# Patient Record
Sex: MALE | Race: WHITE | NOT HISPANIC OR LATINO | Employment: OTHER | URBAN - METROPOLITAN AREA
[De-identification: names, ages, dates, MRNs, and addresses within clinical notes are randomized per-mention and may not be internally consistent; named-entity substitution may affect disease eponyms.]

---

## 2017-03-16 ENCOUNTER — HOSPITAL ENCOUNTER (OUTPATIENT)
Dept: CARDIOLOGY | Facility: MEDICAL CENTER | Age: 72
End: 2017-03-16
Attending: OPHTHALMOLOGY | Admitting: RADIOLOGY
Payer: MEDICARE

## 2017-03-16 ENCOUNTER — HOSPITAL ENCOUNTER (OUTPATIENT)
Dept: RADIOLOGY | Facility: MEDICAL CENTER | Age: 72
End: 2017-03-16
Attending: OPHTHALMOLOGY
Payer: MEDICARE

## 2017-03-16 DIAGNOSIS — H34.211 CHOLESTEROL RETINAL EMBOLUS OF RIGHT EYE: ICD-10-CM

## 2017-03-16 LAB
LV EJECT FRACT  99904: 65
LV EJECT FRACT MOD 2C 99903: 58.98
LV EJECT FRACT MOD 4C 99902: 70.6
LV EJECT FRACT MOD BP 99901: 65.99

## 2017-03-16 PROCEDURE — 93306 TTE W/DOPPLER COMPLETE: CPT

## 2017-03-16 PROCEDURE — 93306 TTE W/DOPPLER COMPLETE: CPT | Mod: 26 | Performed by: INTERNAL MEDICINE

## 2017-03-16 PROCEDURE — 93880 EXTRACRANIAL BILAT STUDY: CPT

## 2017-05-30 ENCOUNTER — HOSPITAL ENCOUNTER (OUTPATIENT)
Dept: RADIOLOGY | Facility: MEDICAL CENTER | Age: 72
End: 2017-05-30
Attending: OTOLARYNGOLOGY
Payer: MEDICARE

## 2017-05-30 DIAGNOSIS — J32.2 CHRONIC ETHMOIDAL SINUSITIS: ICD-10-CM

## 2017-05-30 PROCEDURE — 70486 CT MAXILLOFACIAL W/O DYE: CPT

## 2017-06-18 ENCOUNTER — HOSPITAL ENCOUNTER (EMERGENCY)
Facility: MEDICAL CENTER | Age: 72
End: 2017-06-18
Attending: EMERGENCY MEDICINE
Payer: MEDICARE

## 2017-06-18 ENCOUNTER — OFFICE VISIT (OUTPATIENT)
Dept: URGENT CARE | Facility: PHYSICIAN GROUP | Age: 72
End: 2017-06-18
Payer: MEDICARE

## 2017-06-18 ENCOUNTER — HOSPITAL ENCOUNTER (OUTPATIENT)
Dept: RADIOLOGY | Facility: MEDICAL CENTER | Age: 72
End: 2017-06-18
Attending: PHYSICIAN ASSISTANT
Payer: MEDICARE

## 2017-06-18 ENCOUNTER — HOSPITAL ENCOUNTER (OUTPATIENT)
Facility: MEDICAL CENTER | Age: 72
End: 2017-06-18
Attending: PHYSICIAN ASSISTANT
Payer: MEDICARE

## 2017-06-18 VITALS
BODY MASS INDEX: 23.05 KG/M2 | DIASTOLIC BLOOD PRESSURE: 84 MMHG | TEMPERATURE: 97.6 F | HEART RATE: 86 BPM | HEIGHT: 69 IN | RESPIRATION RATE: 165 BRPM | WEIGHT: 155.65 LBS | SYSTOLIC BLOOD PRESSURE: 134 MMHG

## 2017-06-18 VITALS
RESPIRATION RATE: 14 BRPM | HEART RATE: 70 BPM | DIASTOLIC BLOOD PRESSURE: 70 MMHG | TEMPERATURE: 98.2 F | SYSTOLIC BLOOD PRESSURE: 110 MMHG | HEIGHT: 69 IN | BODY MASS INDEX: 23.11 KG/M2 | OXYGEN SATURATION: 96 % | WEIGHT: 156 LBS

## 2017-06-18 DIAGNOSIS — C61 PROSTATE CANCER (HCC): ICD-10-CM

## 2017-06-18 DIAGNOSIS — R31.9 HEMATURIA: ICD-10-CM

## 2017-06-18 DIAGNOSIS — R30.0 DYSURIA: ICD-10-CM

## 2017-06-18 LAB
APPEARANCE UR: ABNORMAL
APPEARANCE UR: NORMAL
BILIRUB UR QL STRIP.AUTO: NEGATIVE
BILIRUB UR STRIP-MCNC: NORMAL MG/DL
COLOR UR AUTO: YELLOW
COLOR UR: YELLOW
EPI CELLS #/AREA URNS HPF: ABNORMAL /HPF
GLUCOSE UR STRIP.AUTO-MCNC: NEGATIVE MG/DL
GLUCOSE UR STRIP.AUTO-MCNC: NORMAL MG/DL
KETONES UR STRIP.AUTO-MCNC: ABNORMAL MG/DL
KETONES UR STRIP.AUTO-MCNC: NORMAL MG/DL
LEUKOCYTE ESTERASE UR QL STRIP.AUTO: NEGATIVE
LEUKOCYTE ESTERASE UR QL STRIP.AUTO: NORMAL
MICRO URNS: ABNORMAL
NITRITE UR QL STRIP.AUTO: NEGATIVE
NITRITE UR QL STRIP.AUTO: NORMAL
PH UR STRIP.AUTO: 6 [PH]
PH UR STRIP.AUTO: 7.5 [PH] (ref 5–8)
PROT UR QL STRIP: NEGATIVE MG/DL
PROT UR QL STRIP: NORMAL MG/DL
RBC # URNS HPF: >150 /HPF
RBC UR QL AUTO: ABNORMAL
RBC UR QL AUTO: NORMAL
SP GR UR STRIP.AUTO: 1
SP GR UR STRIP.AUTO: 1.02
UROBILINOGEN UR STRIP-MCNC: NORMAL MG/DL
WBC #/AREA URNS HPF: ABNORMAL /HPF

## 2017-06-18 PROCEDURE — 81002 URINALYSIS NONAUTO W/O SCOPE: CPT | Performed by: PHYSICIAN ASSISTANT

## 2017-06-18 PROCEDURE — 74176 CT ABD & PELVIS W/O CONTRAST: CPT

## 2017-06-18 PROCEDURE — 81001 URINALYSIS AUTO W/SCOPE: CPT

## 2017-06-18 PROCEDURE — 99203 OFFICE O/P NEW LOW 30 MIN: CPT | Performed by: PHYSICIAN ASSISTANT

## 2017-06-18 PROCEDURE — 99283 EMERGENCY DEPT VISIT LOW MDM: CPT

## 2017-06-18 RX ORDER — CHOLECALCIFEROL (VITAMIN D3) 50 MCG
2000 TABLET ORAL DAILY
Status: SHIPPED | COMMUNITY
End: 2018-12-27

## 2017-06-18 RX ORDER — NIACIN 500 MG
500 TABLET ORAL DAILY
Status: SHIPPED | COMMUNITY
End: 2017-08-21

## 2017-06-18 RX ORDER — ATORVASTATIN CALCIUM 40 MG/1
40 TABLET, FILM COATED ORAL EVERY EVENING
Status: SHIPPED | COMMUNITY
End: 2021-04-24

## 2017-06-18 RX ORDER — DUTASTERIDE 0.5 MG/1
0.5 CAPSULE, LIQUID FILLED ORAL EVERY MORNING
Status: SHIPPED | COMMUNITY
End: 2021-04-24

## 2017-06-18 RX ORDER — POLYETHYLENE GLYCOL 3350 17 G/17G
17 POWDER, FOR SOLUTION ORAL DAILY
COMMUNITY

## 2017-06-18 RX ORDER — CIPROFLOXACIN 500 MG/1
500 TABLET, FILM COATED ORAL 2 TIMES DAILY
Qty: 10 TAB | Refills: 0 | Status: SHIPPED | OUTPATIENT
Start: 2017-06-18 | End: 2017-06-23

## 2017-06-18 ASSESSMENT — ENCOUNTER SYMPTOMS
EYES NEGATIVE: 1
RESPIRATORY NEGATIVE: 1
GASTROINTESTINAL NEGATIVE: 1
CONSTITUTIONAL NEGATIVE: 1
MUSCULOSKELETAL NEGATIVE: 1
NEUROLOGICAL NEGATIVE: 1
PSYCHIATRIC NEGATIVE: 1
CARDIOVASCULAR NEGATIVE: 1
FLANK PAIN: 0

## 2017-06-18 NOTE — PATIENT INSTRUCTIONS
Dean Parker M.D.  [address]  [address]  [place] [address]  [phone]    Diverticulitis  Diverticulitis is inflammation or infection of small pouches in your colon that form when you have a condition called diverticulosis. The pouches in your colon are called diverticula. Your colon, or large intestine, is where water is absorbed and stool is formed.  Complications of diverticulitis can include:  · Bleeding.  · Severe infection.  · Severe pain.  · Perforation of your colon.  · Obstruction of your colon.  CAUSES   Diverticulitis is caused by bacteria.  Diverticulitis happens when stool becomes trapped in diverticula. This allows bacteria to grow in the diverticula, which can lead to inflammation and infection.  RISK FACTORS  People with diverticulosis are at risk for diverticulitis. Eating a diet that does not include enough fiber from fruits and vegetables may make diverticulitis more likely to develop.  SYMPTOMS   Symptoms of diverticulitis may include:  · Abdominal pain and tenderness. The pain is normally located on the left side of the abdomen, but may occur in other areas.  · Fever and chills.  · Bloating.  · Cramping.  · Nausea.  · Vomiting.  · Constipation.  · Diarrhea.  · Blood in your stool.  DIAGNOSIS   Your health care provider will ask you about your medical history and do a physical exam. You may need to have tests done because many medical conditions can cause the same symptoms as diverticulitis. Tests may include:  · Blood tests.  · Urine tests.  · Imaging tests of the abdomen, including X-rays and CT scans.  When your condition is under control, your health care provider may recommend that you have a colonoscopy. A colonoscopy can show how severe your diverticula are and whether something else is causing your symptoms.  TREATMENT   Most cases of diverticulitis are mild and can be treated at home. Treatment may include:  · Taking over-the-counter pain medicines.  · Following a clear liquid  diet.  · Taking antibiotic medicines by mouth for 7-10 days.  More severe cases may be treated at a hospital. Treatment may include:  · Not eating or drinking.  · Taking prescription pain medicine.  · Receiving antibiotic medicines through an IV tube.  · Receiving fluids and nutrition through an IV tube.  · Surgery.  HOME CARE INSTRUCTIONS   · Follow your health care provider's instructions carefully.  · Follow a full liquid diet or other diet as directed by your health care provider. After your symptoms improve, your health care provider may tell you to change your diet. He or she may recommend you eat a high-fiber diet. Fruits and vegetables are good sources of fiber. Fiber makes it easier to pass stool.  · Take fiber supplements or probiotics as directed by your health care provider.  · Only take medicines as directed by your health care provider.  · Keep all your follow-up appointments.  SEEK MEDICAL CARE IF:   · Your pain does not improve.  · You have a hard time eating food.  · Your bowel movements do not return to normal.  SEEK IMMEDIATE MEDICAL CARE IF:   · Your pain becomes worse.  · Your symptoms do not get better.  · Your symptoms suddenly get worse.  · You have a fever.  · You have repeated vomiting.  · You have bloody or black, tarry stools.  MAKE SURE YOU:   · Understand these instructions.  · Will watch your condition.  · Will get help right away if you are not doing well or get worse.     This information is not intended to replace advice given to you by your health care provider. Make sure you discuss any questions you have with your health care provider.     Document Released: 09/27/2006 Document Revised: 12/23/2014 Document Reviewed: 11/12/2014  [employer] Patient Education ©2016 [employer]

## 2017-06-18 NOTE — ED AVS SNAPSHOT
Home Care Instructions                                                                                                                Laci Baugh   MRN: [id number]    Department:  [facility], Emergency Dept   Date of Visit:  6/18/2017            [facility], Emergency Dept    [address]    [place] [address]    Phone:  [phone]      You were seen by     Elieser Florez D.O.      Your Diagnosis Was     Hematuria     R31.9       Follow-up Information     1. Schedule an appointment as soon as possible for a visit with Dean Parker M.D..    Specialty:  Urology    Contact information    [address]  [address]  [place] [address]  [phone]          2. Follow up with [facility], Emergency Dept.    Specialty:  Emergency Medicine    Why:  If symptoms worsen    Contact information    [address] [place] [address] [phone]        3. Schedule an appointment as soon as possible for a visit with Douglas Weiss M.D..    Specialty:  Internal Medicine    Why:  As needed    Contact information    [address]  [facility] [id number] [phone]        Medication Information     Review all of your home medications and newly ordered medications with your primary doctor and/or pharmacist as soon as possible. Follow medication instructions as directed by your doctor and/or pharmacist.     Please keep your complete medication list with you and share with your physician. Update the information when medications are discontinued, doses are changed, or new medications (including over-the-counter products) are added; and carry medication information at all times in the event of emergency situations.               Medication List      ASK your doctor about these medications        Instructions    Morning Afternoon Evening Bedtime    AVODART 0.5 MG capsule   Generic drug:  dutasteride        Take 0.5 mg by mouth every day.    Dose:  0.5 mg                        coenzyme Q-10 30 MG capsule        Take 60 mg by mouth every day.   Dose:  60 mg                        metformin  MG Tb24   Commonly known as:  GLUCOPHAGE XR        Take 750 mg by mouth every day.   Dose:  750 mg                        polyethylene glycol/lytes Pack   Commonly known as:  MIRALAX        Take 17 g by mouth every day. Indications: Constipation   Dose:  17 g                                Procedures and tests performed during your visit     URINALYSIS    URINE MICROSCOPIC (W/UA)        Discharge Instructions       Hematuria, Adult  Hematuria is blood in your urine. It can be caused by a bladder infection, kidney infection, prostate infection, kidney stone, or cancer of your urinary tract. Infections can usually be treated with medicine, and a kidney stone usually will pass through your urine. If neither of these is the cause of your hematuria, further workup to find out the reason may be needed.  It is very important that you tell your health care provider about any blood you see in your urine, even if the blood stops without treatment or happens without causing pain. Blood in your urine that happens and then stops and then happens again can be a symptom of a very serious condition. Also, pain is not a symptom in the initial stages of many urinary cancers.  HOME CARE INSTRUCTIONS   · Drink lots of fluid, 3-4 quarts a day. If you have been diagnosed with an infection, cranberry juice is especially recommended, in addition to large amounts of water.  · Avoid caffeine, tea, and carbonated beverages because they tend to irritate the bladder.  · Avoid alcohol because it may irritate the prostate.  · Take all medicines as directed by your health care provider.  · If you were prescribed an antibiotic medicine, finish it all even if you start to feel better.  · If you have been diagnosed with a kidney stone, follow your health care provider's instructions regarding  straining your urine to catch the stone.  · Empty your bladder often. Avoid holding urine for long periods of time.  · After a bowel movement, women should cleanse front to back. Use each tissue only once.  · Empty your bladder before and after sexual intercourse if you are a female.  SEEK MEDICAL CARE IF:  · You develop back pain.  · You have a fever.  · You have a feeling of sickness in your stomach (nausea) or vomiting.  · Your symptoms are not better in 3 days. Return sooner if you are getting worse.  SEEK IMMEDIATE MEDICAL CARE IF:   · You develop severe vomiting and are unable to keep the medicine down.  · You develop severe back or abdominal pain despite taking your medicines.  · You begin passing a large amount of blood or clots in your urine.  · You feel extremely weak or faint, or you pass out.  MAKE SURE YOU:   · Understand these instructions.  · Will watch your condition.  · Will get help right away if you are not doing well or get worse.     This information is not intended to replace advice given to you by your health care provider. Make sure you discuss any questions you have with your health care provider.     Document Released: 12/18/2006 Document Revised: 01/08/2016 Document Reviewed: 08/18/2014  [employer] Interactive Patient Education ©2016 [employer]            Patient Information     Patient Information    Following emergency treatment: all patient requiring follow-up care must return either to a private physician or a clinic if your condition worsens before you are able to obtain further medical attention, please return to the emergency room.     Billing Information    At [facility], we work to make the billing process streamlined for our patients.  Our Representatives are here to answer any questions you may have regarding your hospital bill.  If you have insurance coverage and have supplied your insurance information to us, we will submit a claim to your insurer on your behalf.  Should  you have any questions regarding your bill, we can be reached online or by phone as follows:  Online: You are able pay your bills online or live chat with our representatives about any billing questions you may have. We are here to help Monday - Friday from 8:00am to 7:30pm and 9:00am - 12:00pm on Saturdays.  Please visit [email or web address]  for more information.   Phone:  [phone] or [phone]    Please note that your emergency physician, surgeon, pathologist, radiologist, anesthesiologist, and other specialists are not employed by [facility] and will therefore bill separately for their services.  Please contact them directly for any questions concerning their bills at the numbers below:     Emergency Physician Services:  [phone]  [facility]:  [phone]  [facility]:  [phone]  [facility]:  [phone]    1. Your final bill may vary from the amount quoted upon discharge if all procedures are not complete at that time, or if your doctor has additional procedures of which we are not aware. You will receive an additional bill if you return to the Emergency Department at [facility] for suture removal regardless of the facility of which the sutures were placed.     2. Please arrange for settlement of this account at the emergency registration.    3. All self-pay accounts are due in full at the time of treatment.  If you are unable to meet this obligation then payment is expected within 4-5 days.     4. If you have had radiology studies (CT, X-ray, Ultrasound, MRI), you have received a preliminary result during your emergency department visit. Please contact the radiology department [phone] to receive a copy of your final result. Please discuss the Final result with your primary physician or with the follow up physician provided.     Crisis Hotline:  National Crisis Hotline:  7-479-WFZYWAH or  1-924.919.7831  [place] Crisis Hotline:    1-652.159.4437 or 948-516-2490         ED Discharge Follow Up Questions    1. In order to provide you with very good care, we would like to follow up with a phone call in the next few days.  May we have your permission to contact you?     YES /  NO    2. What is the best phone number to call you? (       )_____-__________    3. What is the best time to call you?      Morning  /  Afternoon  /  Evening                   Patient Signature:  ____________________________________________________________    Date:  ____________________________________________________________

## 2017-06-18 NOTE — ED NOTES
Pt D/C to home. D/C instructions given to patient. Pt to call Monday morning and make an apt with urology for follow up; phone number highlighted. Pt verbalizes understanding. Pt leaves ED with no acute changes, complaints or concerns.

## 2017-06-18 NOTE — ED AVS SNAPSHOT
6/18/2017    Laci Schwarz Sheets  [address] [place] [address]    Dear Laci:    [facility] wants to ensure your discharge home is safe and you or your loved ones have had all of your questions answered regarding your care after you leave the hospital.    Below is a list of resources and contact information should you have any questions regarding your hospital stay, follow-up instructions, or active medical symptoms.    Questions or Concerns Regarding… Contact   Medical Questions Related to Your Discharge  (7 days a week, 8am-5pm) Contact a Nurse Care Coordinator   [phone]   Medical Questions Not Related to Your Discharge  (24 hours a day / 7 days a week)  Contact the Nurse Health Line   [phone]    Medications or Discharge Instructions Refer to your discharge packet   or contact your [facility] Primary Care Provider   [phone]   Follow-up Appointment(s) Schedule your appointment via [employer]   or contact Scheduling [phone]   Billing Review your statement via [employer]  or contact Billing [phone]   Medical Records Review your records via [employer]   or contact Medical Records [phone]     You may receive a telephone call within two days of discharge. This call is to make certain you understand your discharge instructions and have the opportunity to have any questions answered. You can also easily access your medical information, test results and upcoming appointments via the [employer] free online health management tool. You can learn more and sign up at [email or web address]. For assistance setting up your [employer] account, please call [phone].    Once again, we want to ensure your discharge home is safe and that you have a clear understanding of any next steps in your care. If you have any questions or concerns, please do not hesitate to contact us, we are here for you. Thank you for choosing [facility] for your healthcare needs.    Sincerely,    Your [facility] Healthcare Team

## 2017-06-18 NOTE — ED NOTES
Pt presents to the ER w/c/o blood in urine. Pt states that yesterday his urine was pink, but he worked out this morning and then afterwards he noted that he was peeing blood with clots in them. Pt denies pain/fevers.

## 2017-06-18 NOTE — ED PROVIDER NOTES
ED Provider Note    CHIEF COMPLAINT  Chief Complaint   Patient presents with   • Blood in Urine       HPI  Laci Baugh is a 72 y.o. male who presents complaining of blood in his urine ×24 hours. He stated that he yesterday had stopped light pink tinged urine and today has severe clots in his urine. He will urinate without difficulty. Denies fever, shakes, chills, sweats, abdominal pain. He has had a polyp on his prostate diagnosed before without problems. He states occasionally he has pink urine but never to red clots. His urine is clearing currently.    REVIEW OF SYSTEMS  Pertinent positives include bloody urine  Pertinent negatives include fever, abdominal pain    PAST MEDICAL HISTORY  Past Medical History   Diagnosis Date   • Hypertension    • Stroke        FAMILY HISTORY  No family history on file.    SOCIAL HISTORY  Social History     Social History   • Marital Status:      Spouse Name: N/A   • Number of Children: N/A   • Years of Education: N/A     Social History Main Topics   • Smoking status: Former Smoker   • Smokeless tobacco: Not on file   • Alcohol Use: Not on file   • Drug Use: Not on file   • Sexual Activity: Not on file     Other Topics Concern   • Not on file     Social History Narrative   • No narrative on file       SURGICAL HISTORY  No past surgical history on file.    CURRENT MEDICATIONS  Home Medications     Reviewed by Sayra Sanford R.N. (Registered Nurse) on 06/18/17 at 0615  Med List Status: Complete    Medication Last Dose Status    aspirin (ASA) 81 MG Chew Tab chewable tablet  Active    atorvastatin (LIPITOR) 20 MG Tab 6/23/2016 Active    coenzyme Q-10 30 MG capsule 6/18/2017 Active    dutasteride (AVODART) 0.5 MG capsule 6/18/2017 Active    metformin ER (GLUCOPHAGE XR) 750 MG TABLET SR 24 HR 6/23/2016 Active    polyethylene glycol/lytes (MIRALAX) Pack 6/18/2017 Active    vitamin D (CHOLECALCIFEROL) 1000 UNIT Tab 6/18/2017 Active                ALLERGIES  Allergies    Allergen Reactions   • Bloodless    • Contrast Media With Iodine [Iodine]        PHYSICAL EXAM  VITAL SIGNS: There were no vitals taken for this visit.     Constitutional: Well developed, Well nourished, No acute distress, Non-toxic appearance.   Eyes: PERRLA, EOMI, Conjunctiva normal, No discharge.   Abdomen: Bowel sounds normal, Soft, No suprapubic tenderness, No guarding, No rebound, No masses, No pulsatile masses.   Skin: Warm, Dry, No erythema, No rash.   Back: No midline thoracic and lumbar spine tenderness, No CVA tenderness.   Extremities:  No edema,no obvious deformity, no evidence of trauma  Neurologic: Alert & oriented x 3, No focal deficits noted, normal gait.    Results for orders placed or performed during the hospital encounter of 06/18/17   URINALYSIS   Result Value Ref Range    Micro Urine Req Microscopic     Color Yellow     Character Hazy (A)     Specific Gravity 1.020 <1.035    Ph 6.0 5.0-8.0    Glucose Negative Negative mg/dL    Ketones Trace (A) Negative mg/dL    Protein Negative Negative mg/dL    Bilirubin Negative Negative    Nitrite Negative Negative    Leukocyte Esterase Negative Negative    Occult Blood Large (A) Negative   URINE MICROSCOPIC (W/UA)   Result Value Ref Range    WBC 0-2 (A) /hpf    RBC >150 (A) /hpf    Epithelial Cells Rare Few /hpf       COURSE & MEDICAL DECISION MAKING  Pertinent Labs & Imaging studies reviewed. (See chart for details)  This is a charming 72-year-old male with hematuria. The patient does not have evidence of urinary tract infection and he was able urinate here in the emergency department and is urine has cleared significantly. The differential includes polyp, cancer, infection. The patient will be following up with Dr. Sánchez of urology for further evaluation of possible cystoscopy and biopsy. The patient understands the need to return to the emergency department if he is unable to urinate, has high fevers or severe pain.    New Prescriptions    No  medications on file         FINAL IMPRESSION  Hematuria     The patient will return for new or worsening symptoms and is stable at the time of discharge.    The patient is referred to a primary physician for blood pressure management, diabetic screening, and for all other preventative health concerns.    DISPOSITION:  Patient will be discharged home in stable condition.    FOLLOW UP:  Dean Parker M.D.  [address]  [address]  [place] [address]  [phone]    Schedule an appointment as soon as possible for a visit      [facility], Emergency Dept  [address] [facility]  [place] [id number] [phone]    If symptoms worsen    Douglas Weiss M.D.  [address] [facility] [address] [phone]    Schedule an appointment as soon as possible for a visit  As needed      OUTPATIENT MEDICATIONS:  New Prescriptions    No medications on file

## 2017-06-18 NOTE — MR AVS SNAPSHOT
"        Laci Schwarz Sheets   2017 4:15 PM   Office Visit   MRN: [id number]    Department:  [facility]   Dept Phone:  [phone]    Description:  Male : 1945   Provider:  Rubens Yang PA-C           Reason for Visit     Blood in Urine blood in urine x1 day      Allergies as of 2017     Allergen Noted Reactions    Bloodless 2016       Contrast Media With Iodine [Iodine] 2016   Rash    \"Rash all over body\".        You were diagnosed with     Hematuria   [3179879]       Prostate cancer (CMS-HCC)   [597783]       Dysuria   [788.1.ICD-9-CM]         Vital Signs     Blood Pressure Pulse Temperature Respirations Height Weight    110/70 mmHg 70 36.8 °C (98.2 °F) 14 1.753 m (5' 9.02\") 70.761 kg (156 lb)    Body Mass Index Oxygen Saturation Smoking Status             23.03 kg/m2 96% Former Smoker         Basic Information     Date Of Birth Sex Race Ethnicity Preferred Language    1945 Male White Non- English      Problem List              ICD-10-CM Priority Class Noted - Resolved    Hyponatremia E87.1   2016 - Present    Confusion R41.0   2016 - Present    HTN (hypertension) I10   2016 - Present    HLD (hyperlipidemia) E78.5   2016 - Present    Chest pressure R07.89   2016 - Present    BPH (benign prostatic hyperplasia) N40.0   2016 - Present    DM2 (diabetes mellitus, type 2) (CMS-HCC) E11.9   2016 - Present      Health Maintenance     Patient has no pending health maintenance at this time      Results     POCT Urinalysis      Component Value Standard Range & Units    POC Color yellow Negative    POC Appearance cloudy Negative    POC Leukocyte Esterase mod Negative    POC Nitrites neg Negative    POC Urobiligen neg Negative (0.2) mg/dL    POC Protein neg Negative mg/dL    POC Urine PH 7.5 5.0 - 8.0    POC Blood hemolyzed Negative    POC Specific Gravity 1.005 <1.005 - >1.030    POC Ketones neg Negative mg/dL    POC Biliruben neg Negative " mg/dL    POC Glucose neg Negative mg/dL                        Current Immunizations     No immunizations on file.      Below and/or attached are the medications your provider expects you to take. Review all of your home medications and newly ordered medications with your provider and/or pharmacist. Follow medication instructions as directed by your provider and/or pharmacist. Please keep your medication list with you and share with your provider. Update the information when medications are discontinued, doses are changed, or new medications (including over-the-counter products) are added; and carry medication information at all times in the event of emergency situations     Allergies:  BLOODLESS - (reactions not documented)     CONTRAST MEDIA WITH IODINE - Rash               Medications  Valid as of: June 18, 2017 -  5:58 PM    Generic Name Brand Name Tablet Size Instructions for use    Aspirin (Tablet Delayed Response) ECOTRIN 81 MG Take  mg by mouth every day. Pt takes:  243Mg in AM  81MG at 1200        Atorvastatin Calcium (Tab) LIPITOR 40 MG Take 60 mg by mouth every day.        B Complex Vitamins   Take 1 Tab by mouth every day.        Cholecalciferol (Tab) vitamin D 2000 UNIT Take 2,000 Units by mouth every day.        Ciprofloxacin HCl (Tab) CIPRO 500 MG Take 1 Tab by mouth 2 times a day for 5 days.        Coenzyme Q10 (Cap) coenzyme Q-10 30 MG Take 60 mg by mouth every day.        Dutasteride (Cap) AVODART 0.5 MG Take 0.5 mg by mouth every day.        MetFORMIN HCl (TABLET SR 24 HR) GLUCOPHAGE  MG Take 750 mg by mouth 2 times a day.        Niacin (Tab) niacin 500 MG Take 500 mg by mouth every day.        Polyethylene Glycol 3350 (Pack) MIRALAX  Take 17 g by mouth every day. Indications: Constipation        .                 Medicines prescribed today were sent to:     [id number] - [place] - [address] AT [address] & [facility]    [address]  [address]    Phone: [phone] Fax: [phone]    Open 24 Hours?: No      Medication refill instructions:       If your prescription bottle indicates you have medication refills left, it is not necessary to call your provider’s office. Please contact your pharmacy and they will refill your medication.    If your prescription bottle indicates you do not have any refills left, you may request refills at any time through one of the following ways: The online [employer] system (except Urgent Care), by calling your provider’s office, or by asking your pharmacy to contact your provider’s office with a refill request. Medication refills are processed only during regular business hours and may not be available until the next business day. Your provider may request additional information or to have a follow-up visit with you prior to refilling your medication.   *Please Note: Medication refills are assigned a new Rx number when refilled electronically. Your pharmacy may indicate that no refills were authorized even though a new prescription for the same medication is available at the pharmacy. Please request the medicine by name with the pharmacy before contacting your provider for a refill.        Your To Do List     Future Labs/Procedures Complete By Expires    URINE CULTURE(NEW)  As directed 6/18/2018      Referral     A referral request has been sent to our patient care coordination department. Please allow 3-5 business days for us to process this request and contact you either by phone or mail. If you do not hear from us by the 5th business day, please call us at [phone].        Instructions    Dean Parker M.D.  [address]  [address]  [facility] [address]  [phone]    Diverticulitis  Diverticulitis is inflammation or infection of small pouches in your colon that form when you have a condition called diverticulosis. The pouches in your colon are called diverticula. Your colon, or large intestine, is where water  is absorbed and stool is formed.  Complications of diverticulitis can include:  · Bleeding.  · Severe infection.  · Severe pain.  · Perforation of your colon.  · Obstruction of your colon.  CAUSES   Diverticulitis is caused by bacteria.  Diverticulitis happens when stool becomes trapped in diverticula. This allows bacteria to grow in the diverticula, which can lead to inflammation and infection.  RISK FACTORS  People with diverticulosis are at risk for diverticulitis. Eating a diet that does not include enough fiber from fruits and vegetables may make diverticulitis more likely to develop.  SYMPTOMS   Symptoms of diverticulitis may include:  · Abdominal pain and tenderness. The pain is normally located on the left side of the abdomen, but may occur in other areas.  · Fever and chills.  · Bloating.  · Cramping.  · Nausea.  · Vomiting.  · Constipation.  · Diarrhea.  · Blood in your stool.  DIAGNOSIS   Your health care provider will ask you about your medical history and do a physical exam. You may need to have tests done because many medical conditions can cause the same symptoms as diverticulitis. Tests may include:  · Blood tests.  · Urine tests.  · Imaging tests of the abdomen, including X-rays and CT scans.  When your condition is under control, your health care provider may recommend that you have a colonoscopy. A colonoscopy can show how severe your diverticula are and whether something else is causing your symptoms.  TREATMENT   Most cases of diverticulitis are mild and can be treated at home. Treatment may include:  · Taking over-the-counter pain medicines.  · Following a clear liquid diet.  · Taking antibiotic medicines by mouth for 7-10 days.  More severe cases may be treated at a hospital. Treatment may include:  · Not eating or drinking.  · Taking prescription pain medicine.  · Receiving antibiotic medicines through an IV tube.  · Receiving fluids and nutrition through an IV tube.  · Surgery.  HOME CARE  INSTRUCTIONS   · Follow your health care provider's instructions carefully.  · Follow a full liquid diet or other diet as directed by your health care provider. After your symptoms improve, your health care provider may tell you to change your diet. He or she may recommend you eat a high-fiber diet. Fruits and vegetables are good sources of fiber. Fiber makes it easier to pass stool.  · Take fiber supplements or probiotics as directed by your health care provider.  · Only take medicines as directed by your health care provider.  · Keep all your follow-up appointments.  SEEK MEDICAL CARE IF:   · Your pain does not improve.  · You have a hard time eating food.  · Your bowel movements do not return to normal.  SEEK IMMEDIATE MEDICAL CARE IF:   · Your pain becomes worse.  · Your symptoms do not get better.  · Your symptoms suddenly get worse.  · You have a fever.  · You have repeated vomiting.  · You have bloody or black, tarry stools.  MAKE SURE YOU:   · Understand these instructions.  · Will watch your condition.  · Will get help right away if you are not doing well or get worse.     This information is not intended to replace advice given to you by your health care provider. Make sure you discuss any questions you have with your health care provider.     Document Released: 09/27/2006 Document Revised: 12/23/2014 Document Reviewed: 11/12/2014  [employer] Interactive Patient Education ©2016 Elsevier Inc.            [employer] Access Code: Activation code not generated  Current [employer] Status: Active

## 2017-06-18 NOTE — ED NOTES
Pt went to bathroom and states that urine is starting to get lighter and it looks like there is less blood.

## 2017-06-18 NOTE — DISCHARGE INSTRUCTIONS
Hematuria, Adult  Hematuria is blood in your urine. It can be caused by a bladder infection, kidney infection, prostate infection, kidney stone, or cancer of your urinary tract. Infections can usually be treated with medicine, and a kidney stone usually will pass through your urine. If neither of these is the cause of your hematuria, further workup to find out the reason may be needed.  It is very important that you tell your health care provider about any blood you see in your urine, even if the blood stops without treatment or happens without causing pain. Blood in your urine that happens and then stops and then happens again can be a symptom of a very serious condition. Also, pain is not a symptom in the initial stages of many urinary cancers.  HOME CARE INSTRUCTIONS   · Drink lots of fluid, 3-4 quarts a day. If you have been diagnosed with an infection, cranberry juice is especially recommended, in addition to large amounts of water.  · Avoid caffeine, tea, and carbonated beverages because they tend to irritate the bladder.  · Avoid alcohol because it may irritate the prostate.  · Take all medicines as directed by your health care provider.  · If you were prescribed an antibiotic medicine, finish it all even if you start to feel better.  · If you have been diagnosed with a kidney stone, follow your health care provider's instructions regarding straining your urine to catch the stone.  · Empty your bladder often. Avoid holding urine for long periods of time.  · After a bowel movement, women should cleanse front to back. Use each tissue only once.  · Empty your bladder before and after sexual intercourse if you are a female.  SEEK MEDICAL CARE IF:  · You develop back pain.  · You have a fever.  · You have a feeling of sickness in your stomach (nausea) or vomiting.  · Your symptoms are not better in 3 days. Return sooner if you are getting worse.  SEEK IMMEDIATE MEDICAL CARE IF:   · You develop severe vomiting and  are unable to keep the medicine down.  · You develop severe back or abdominal pain despite taking your medicines.  · You begin passing a large amount of blood or clots in your urine.  · You feel extremely weak or faint, or you pass out.  MAKE SURE YOU:   · Understand these instructions.  · Will watch your condition.  · Will get help right away if you are not doing well or get worse.     This information is not intended to replace advice given to you by your health care provider. Make sure you discuss any questions you have with your health care provider.     Document Released: 12/18/2006 Document Revised: 01/08/2016 Document Reviewed: 08/18/2014  [employer] Interactive Patient Education ©2016 Elsevier Inc.

## 2017-06-18 NOTE — ED AVS SNAPSHOT
riskmethods Access Code: Activation code not generated  Current riskmethods Status: Active    [employer]  A secure, online tool to manage your health information     [employer] riskmethods® is a secure, online tool that connects you to your personalized health information from the privacy of your home -- day or night - making it very easy for you to manage your healthcare. Once the activation process is completed, you can even access your medical information using the riskmethods roseann, which is available for free in the Apple Roseann store or Google Play store.     riskmethods provides the following levels of access (as shown below):   My Chart Features   [facility] Primary Care Doctor [facility]  Specialists [facility]  Urgent  Care Non-[facility]  Primary Care  Doctor   Email your healthcare team securely and privately 24/7 X X X X   Manage appointments: schedule your next appointment; view details of past/upcoming appointments X      Request prescription refills. X      View recent personal medical records, including lab and immunizations X X X X   View health record, including health history, allergies, medications X X X X   Read reports about your outpatient visits, procedures, consult and ER notes X X X X   See your discharge summary, which is a recap of your hospital and/or ER visit that includes your diagnosis, lab results, and care plan. X X       How to register for riskmethods:  1. Go to  [email or web address].  2. Click on the Sign Up Now box, which takes you to the New Member Sign Up page. You will need to provide the following information:  a. Enter your riskmethods Access Code exactly as it appears at the top of this page. (You will not need to use this code after you’ve completed the sign-up process. If you do not sign up before the expiration date, you must request a new code.)   b. Enter your date of birth.   c. Enter your home email address.   d. Click Submit, and follow the next screen’s instructions.  3. Create a riskmethods ID. This will  be your [employer] login ID and cannot be changed, so think of one that is secure and easy to remember.  4. Create a [employer] password. You can change your password at any time.  5. Enter your Password Reset Question and Answer. This can be used at a later time if you forget your password.   6. Enter your e-mail address. This allows you to receive e-mail notifications when new information is available in [employer].  7. Click Sign Up. You can now view your health information.    For assistance activating your [employer] account, call [phone]

## 2017-06-19 DIAGNOSIS — R31.9 HEMATURIA: ICD-10-CM

## 2017-06-19 NOTE — PROGRESS NOTES
"Subjective:      Laci Baugh is a 72 y.o. male who presents with Blood in Urine            HPI  Chief Complaint   Patient presents with   • Blood in Urine     blood in urine x1 day       HPI:  Laci Baugh is a 72 y.o. male who presents with wife with hematuria for the last 24 hours.  Started last night.  Now associated with a slight \"twinge\".  Hx sig for prostate cancer well controlled (PSA levels at same leve per patient) on Avodart.  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  He is concerned about bladder cancer.    No hx of stones.  No flank pain or increased urgency or frequency.  States last PSA was 0.8.  No change from normal nocturia of 3-4 times.    No change in e    nergy levels, night sweats, weight loss.    Was seen in the ED earlier today and had a UA done and advised to f/u with Urologist.  Labs available for review and UA did show blood.  Ua with micro demonstrated WBC.    Patient d/c augmentin 1 week ago for BRS.    Past Medical History   Diagnosis Date   • Hypertension    • Stroke (CMS-HCC)    • Prostate disease    • Skin cancer      basal cell carcinoma & squamous cell carcinoma       No past surgical history on file.    No family history on file.    Social History     Social History   • Marital Status:      Spouse Name: N/A   • Number of Children: N/A   • Years of Education: N/A     Occupational History   • Not on file.     Social History Main Topics   • Smoking status: Former Smoker   • Smokeless tobacco: Not on file   • Alcohol Use: Yes      Comment: 1 glass wine/night   • Drug Use: No   • Sexual Activity: Not on file     Other Topics Concern   • Not on file     Social History Narrative         Current outpatient prescriptions:   •  dutasteride, 0.5 mg, Oral, DAILY, 6/18/2017 at 0430  •  polyethylene glycol/lytes, 17 g, Oral, DAILY, 6/18/2017 at 0430  •  coenzyme Q-10, 60 mg, Oral, DAILY, 6/18/2017 at 0430  • " " aspirin EC,  mg, Oral, DAILY, 6/18/2017 at 0430  •  atorvastatin, 60 mg, Oral, DAILY, 6/18/2017 at 0430  •  vitamin D, 2,000 Units, Oral, DAILY, 6/18/2017 at 0430  •  niacin, 500 mg, Oral, DAILY, 6/18/2017 at 0430  •  B Complex Vitamins (B COMPLEX PO), 1 Tab, Oral, DAILY, 6/18/2017 at 0430  •  metformin ER, 750 mg, Oral, BID, 6/18/2017 at 0430    Allergies   Allergen Reactions   • Bloodless    • Contrast Media With Iodine [Iodine] Rash     \"Rash all over body\".            Review of Systems   Constitutional: Negative.    HENT: Negative.    Eyes: Negative.    Respiratory: Negative.    Cardiovascular: Negative.    Gastrointestinal: Negative.    Genitourinary: Positive for dysuria, urgency, frequency and hematuria. Negative for flank pain.   Musculoskeletal: Negative.    Skin: Negative.    Neurological: Negative.    Endo/Heme/Allergies: Negative.    Psychiatric/Behavioral: Negative.           Objective:     /70 mmHg  Pulse 70  Temp(Src) 36.8 °C (98.2 °F)  Resp 14  Ht 1.753 m (5' 9.02\")  Wt 70.761 kg (156 lb)  BMI 23.03 kg/m2  SpO2 96%     Physical Exam       Nursing note and vitals reviewed.    Constitutional:  Appropriately groomed, pleasant affect, and in no acute distress.    Head: normocephalic and atraumatic.    Eye:   PERRLA, EOM's full, sclera white, no scleral icterus, conjunctiva not erythematous, and medial canthus without exudate bilaterally.    Ears:  Hearing grossly intact to voice.    Throat:  Oropharynx not erythematous, with no enlargement of the palatine tonsils bilaterally with no exudates.    Posterior oropharynx with no post nasal drainage present.  Soft palate rises symmetrically bilaterally and uvula midline.  Neck supple, with mild proximal anterior cervical chain lymphadenopathy that is soft and mobile to palpation.  Thyroid non-palpable.  No supraclavicular lymphadenopathy.    Lungs:  Lungs with normal respiratory excursion and effort.    Heart:  RRR, without murmurs, rubs, " or gallops.  Carotid arteries without bruits bilaterally.  Radial and dorsalis pedis pulses 2+ bilaterally    Abdomen:  Flat.without striations, ecchymosis, or lesions.  Bowel sounds present all 4Qs.  Normotympanic to percussion all 4Qs.   No  TTP.  No masses to palpation.  No hepatosplenomegaly, no TTP at McBurney's point, negative Stover's sign, no rebound tenderness, and no guarding.  No CVA tenderness bilaterally.    MSK:  Gait and station wnl, non antalgic.    Derm:  No rashes or lesions with good turgor pressure.     Psychiatric:  Normal judgement, mood and affect.      6/18/2017 5:07 PM    HISTORY/REASON FOR EXAM:  hematuria.      TECHNIQUE/EXAM DESCRIPTION AND NUMBER OF VIEWS:  CT scan renal/colic without contrast.    5 mm helical images of the abdomen and pelvis were obtained from the diaphragmatic domes through the pubic symphysis.    Low dose optimization technique was utilized for this CT exam including automated exposure control and adjustment of the mA and/or kV according to patient size.    COMPARISON: None.    FINDINGS:  The visualized lung bases are unremarkable.    The unopacified liver, adrenal glands, pancreas, and kidneys are unremarkable.    Splenic calcifications are consistent with prior granulomatous infection.    There are scattered arterial calcifications.    The unopacified bowel is straight scattered diverticula in the sigmoid colon. The appendix is not visualized.    The bladder is unremarkable. The prostate gland appears enlarged.    No significant free fluid or adenopathy.    Postoperative and degenerative changes are in the spine.         Impression        1.  No renal or ureteral calculi are identified. No hydronephrosis.    2.  Prostate enlargement.    3.  Diverticulosis.    4.  Atherosclerosis.         Reading Provider Reading Date     Megan Alfonso M.D. [place]      Assessment/Plan:     1. Hematuria  POCT Urinalysis    URINE CULTURE(NEW)    REFERRAL TO UROLOGY    CANCELED:  CT-ABDOMEN-PELVIS W/O   2. Prostate cancer (CMS-HCC)     3. Dysuria       Patient presents with 24 hours of hematuria with what he describes as a twinge over the past few hours. He was seen in the ED earlier today and a UA at that time demonstrated red blood cells but no leukocyte esterase. UA with microscope did demonstrate white blood cells and few bacteria but also epithelial cells. He does have a past medical history significant for benign prostatic hyperplasia, prostate cancer curling on Avodart, and diabetes type 2. 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    On exam, vital signs within normal limits. No superior tears to palpation or CVA tears bilaterally. Patient did evidently finished a ten-day course of Augmentin 7 days ago for bacterial rhinosinusitis.  No sore throat or URI symptoms today.    Urinalysis demonstrates leukocyte esterase and blood. Given continuation of hematuria and no history of nephrolithiasis did obtain a CT renal colic protocol which demonstrated no nephrolithiasis. Enlarged prostate visible and no evidence of bladder polyps. Sigmoid diverticuli present was no evidence of itis. Did discuss with Dr. Alfonso who did state bladder wall did appear somewhat wavy. No evidence of occult blood in the bladder. Differential diagnosis does include urinary tract infection, less than 1 mm in size nephrolithiasis causing irritation hematuria, diverticuli causing transudative inflammation and irritation to the bladder, bladder cancer. Given recent Augmentin use, urinary tract infection may be likely due to Radha and imbalance. Urine culture ordered. Plan is to treat with Cipro 500 mg twice a day ×5 days. Urology referral made for cystoscopy and likely biopsy.    Patient was in agreement with this treatment plan and seemed to understand  without barriers. All questions were encouraged and answered.  Reviewed signs and symptoms of when to seek emergency medical care.     Please note that this dictation was created using voice recognition software.  I have made every reasonable attempt to correct obvious errors, but I expect there are errors of nadira and possibly content that I did not discover before finalizing the note.

## 2017-06-21 ENCOUNTER — TELEPHONE (OUTPATIENT)
Dept: URGENT CARE | Facility: CLINIC | Age: 72
End: 2017-06-21

## 2017-06-21 LAB
BACTERIA UR CULT: ABNORMAL
BACTERIA UR CULT: ABNORMAL
SIGNIFICANT IND 70042: ABNORMAL
SOURCE SOURCE: ABNORMAL

## 2017-06-21 NOTE — TELEPHONE ENCOUNTER
Informed patient of urine culture results and recommended starting the cipro.  RTC in 7-10 days for UA recheck and/or culture.  (test of cure).

## 2017-07-06 ENCOUNTER — HOSPITAL ENCOUNTER (OUTPATIENT)
Facility: MEDICAL CENTER | Age: 72
End: 2017-07-06
Attending: PHYSICIAN ASSISTANT
Payer: MEDICARE

## 2017-07-06 ENCOUNTER — OFFICE VISIT (OUTPATIENT)
Dept: URGENT CARE | Facility: CLINIC | Age: 72
End: 2017-07-06
Payer: MEDICARE

## 2017-07-06 VITALS
BODY MASS INDEX: 23.11 KG/M2 | DIASTOLIC BLOOD PRESSURE: 72 MMHG | HEART RATE: 76 BPM | TEMPERATURE: 97 F | RESPIRATION RATE: 12 BRPM | OXYGEN SATURATION: 95 % | SYSTOLIC BLOOD PRESSURE: 102 MMHG | WEIGHT: 156 LBS | HEIGHT: 69 IN

## 2017-07-06 DIAGNOSIS — N39.0 URINARY TRACT INFECTION WITHOUT HEMATURIA, SITE UNSPECIFIED: ICD-10-CM

## 2017-07-06 DIAGNOSIS — Z09 FOLLOW-UP EXAM: ICD-10-CM

## 2017-07-06 LAB
APPEARANCE UR: CLEAR
BILIRUB UR STRIP-MCNC: NORMAL MG/DL
COLOR UR AUTO: YELLOW
GLUCOSE UR STRIP.AUTO-MCNC: NORMAL MG/DL
KETONES UR STRIP.AUTO-MCNC: NORMAL MG/DL
LEUKOCYTE ESTERASE UR QL STRIP.AUTO: NORMAL
NITRITE UR QL STRIP.AUTO: NORMAL
PH UR STRIP.AUTO: 7.5 [PH] (ref 5–8)
PROT UR QL STRIP: NORMAL MG/DL
RBC UR QL AUTO: NORMAL
SP GR UR STRIP.AUTO: 1
UROBILINOGEN UR STRIP-MCNC: NORMAL MG/DL

## 2017-07-06 PROCEDURE — 99214 OFFICE O/P EST MOD 30 MIN: CPT | Performed by: PHYSICIAN ASSISTANT

## 2017-07-06 PROCEDURE — 87086 URINE CULTURE/COLONY COUNT: CPT

## 2017-07-06 PROCEDURE — 81002 URINALYSIS NONAUTO W/O SCOPE: CPT | Performed by: PHYSICIAN ASSISTANT

## 2017-07-06 ASSESSMENT — ENCOUNTER SYMPTOMS
VOMITING: 0
MUSCULOSKELETAL NEGATIVE: 1
HEADACHES: 0
ABDOMINAL PAIN: 0
DIZZINESS: 0
FEVER: 0
SHORTNESS OF BREATH: 0
BLOOD IN STOOL: 0
CHILLS: 0
CONSTIPATION: 0
FLANK PAIN: 0
NAUSEA: 0

## 2017-07-06 NOTE — PROGRESS NOTES
"Subjective:      Laci Baugh is a 72 y.o. male who presents with follow up.          HPI  Patient presents to urgent care for follow up of UTI. He was having gross hematuria symptoms 2.5 weeks ago and was seen in urgent care on 6/18. He had a CT done which was negative for nephrolithiasis or bladder polyps/masses. Patient's urine culture grew out Proteus and he was started on Cipro 500 mg BID x 5 days, which he recently finished 4 days ago. He has since followed up with urology and had cystoscopy performed and told everything was normal. He denies any current dysuria or hematuria. Reports frequency and urgency is normal for him with his BPH.     Review of Systems   Constitutional: Negative for fever and chills.   Respiratory: Negative for shortness of breath.    Cardiovascular: Negative for chest pain.   Gastrointestinal: Negative for nausea, vomiting, abdominal pain, constipation and blood in stool.   Genitourinary: Negative for dysuria, urgency, frequency, hematuria and flank pain.   Musculoskeletal: Negative.    Neurological: Negative for dizziness and headaches.          Objective:     /72 mmHg  Pulse 76  Temp(Src) 36.1 °C (97 °F)  Resp 12  Ht 1.753 m (5' 9.02\")  Wt 70.761 kg (156 lb)  BMI 23.03 kg/m2  SpO2 95%     Physical Exam   Constitutional: He is oriented to person, place, and time. He appears well-developed and well-nourished. No distress.   HENT:   Head: Normocephalic and atraumatic.   Eyes: Conjunctivae are normal. Pupils are equal, round, and reactive to light. Right eye exhibits no discharge. Left eye exhibits no discharge.   Neck: Normal range of motion.   Cardiovascular: Normal rate.    Pulmonary/Chest: Effort normal.   Abdominal: Soft. Bowel sounds are normal. He exhibits no distension. There is no tenderness. There is no guarding.   No CVAT bilaterally   Musculoskeletal: Normal range of motion.   Neurological: He is alert and oriented to person, place, and time.   Skin: Skin is " "warm and dry. He is not diaphoretic.   Psychiatric: He has a normal mood and affect. His behavior is normal.   Nursing note and vitals reviewed.         PMH:  has a past medical history of Hypertension; Stroke (CMS-HCC); Prostate disease; and Skin cancer.  MEDS:   Current outpatient prescriptions:   •  dutasteride (AVODART) 0.5 MG capsule, Take 0.5 mg by mouth every day., Disp: , Rfl:   •  polyethylene glycol/lytes (MIRALAX) Pack, Take 17 g by mouth every day. Indications: Constipation, Disp: , Rfl:   •  coenzyme Q-10 30 MG capsule, Take 60 mg by mouth every day., Disp: , Rfl:   •  aspirin EC (ECOTRIN) 81 MG Tablet Delayed Response, Take  mg by mouth every day. Pt takes: 243Mg in AM 81MG at 1200, Disp: , Rfl:   •  atorvastatin (LIPITOR) 40 MG Tab, Take 60 mg by mouth every day., Disp: , Rfl:   •  Cholecalciferol (VITAMIN D) 2000 UNIT Tab, Take 2,000 Units by mouth every day., Disp: , Rfl:   •  niacin 500 MG Tab, Take 500 mg by mouth every day., Disp: , Rfl:   •  B Complex Vitamins (B COMPLEX PO), Take 1 Tab by mouth every day., Disp: , Rfl:   •  metformin ER (GLUCOPHAGE XR) 750 MG TABLET SR 24 HR, Take 750 mg by mouth 2 times a day., Disp: , Rfl:   ALLERGIES:   Allergies   Allergen Reactions   • Bloodless    • Contrast Media With Iodine [Iodine] Rash     \"Rash all over body\".       SURGHX: History reviewed. No pertinent past surgical history.  SOCHX:  reports that he has quit smoking. He has never used smokeless tobacco. He reports that he drinks alcohol. He reports that he does not use illicit drugs.  FH: family history is not on file.    POCT Urinalysis:   Component Results      Component Value Ref Range & Units Status     POC Color Yellow Negative Final     POC Appearance Clear Negative Final     POC Leukocyte Esterase TR Negative Final     POC Nitrites Neg Negative Final     POC Urobiligen Neg Negative (0.2) mg/dL Final     POC Protein Neg Negative mg/dL Final     POC Urine PH 7.5 5.0 - 8.0 Final     POC " Blood Neg Negative Final     POC Specific Gravity 1.005 <1.005 - >1.030 Final     POC Ketones Neg Negative mg/dL Final     POC Biliruben Neg Negative mg/dL Final     POC Glucose Neg Negative mg/dL Final         Last Resulted Time     Thu Jul 6, 2017  9:22 AM          Assessment/Plan:     1. Follow-up exam    2. Urinary tract infection without hematuria, site unspecified  - POCT Urinalysis --> trace leuks, otherwise normal  - Urine Culture; Future    Urinalysis shows trace leuks, advised patient this is most likely normal but will send urine for culture to confirm. Advised to continue increased fluids. Will call with culture results when received. The patient demonstrated a good understanding and agreed with the treatment plan.

## 2017-07-06 NOTE — MR AVS SNAPSHOT
"        Laci Schwarz Sheets   2017 8:45 AM   Office Visit   MRN: [id number]    Department:  [facility]   Dept Phone:  [phone]    Description:  Male : 1945   Provider:  Lidia Mukherjee PA-C           Reason for Visit     Other Follow up for UTI      Allergies as of 2017     Allergen Noted Reactions    Bloodless 2016       Contrast Media With Iodine [Iodine] 2016   Rash    \"Rash all over body\".        You were diagnosed with     Follow-up exam   [985983]       Urinary tract infection without hematuria, site unspecified   [5917413]         Vital Signs     Blood Pressure Pulse Temperature Respirations Height Weight    102/72 mmHg 76 36.1 °C (97 °F) 12 1.753 m (5' 9.02\") 70.761 kg (156 lb)    Body Mass Index Oxygen Saturation Smoking Status             23.03 kg/m2 95% Former Smoker         Basic Information     Date Of Birth Sex Race Ethnicity Preferred Language    1945 Male White Non- English      Problem List              ICD-10-CM Priority Class Noted - Resolved    Hyponatremia E87.1   2016 - Present    Confusion R41.0   2016 - Present    HTN (hypertension) I10   2016 - Present    HLD (hyperlipidemia) E78.5   2016 - Present    Chest pressure R07.89   2016 - Present    BPH (benign prostatic hyperplasia) N40.0   2016 - Present    DM2 (diabetes mellitus, type 2) (CMS-[facility]) E11.9   2016 - Present      Health Maintenance        Date Due Completion Dates    DIABETES MONOFILAMENT / LE EXAM 1945 ---    RETINAL SCREENING 5/15/1962 ---    URINE ACR / MICROALBUMIN 5/15/1962 ---    IMM DTaP/Tdap/Td Vaccine (1 - Tdap) 5/15/1963 ---    COLONOSCOPY 5/15/1994 ---    IMM ZOSTER VACCINE 5/15/2004 ---    IMM PNEUMOCOCCAL 65+ (ADULT) LOW/MEDIUM RISK SERIES (1 of 2 - PCV13) 5/15/2009 ---    A1C SCREENING 2016, 2015    FASTING LIPID PROFILE 2017, 2015    SERUM CREATININE 2017, 2016    IMM " INFLUENZA (1) 9/1/2017 ---            Results     POCT Urinalysis      Component Value Standard Range & Units    POC Color Yellow Negative    POC Appearance Clear Negative    POC Leukocyte Esterase TR Negative    POC Nitrites Neg Negative    POC Urobiligen Neg Negative (0.2) mg/dL    POC Protein Neg Negative mg/dL    POC Urine PH 7.5 5.0 - 8.0    POC Blood Neg Negative    POC Specific Gravity 1.005 <1.005 - >1.030    POC Ketones Neg Negative mg/dL    POC Biliruben Neg Negative mg/dL    POC Glucose Neg Negative mg/dL                        Current Immunizations     No immunizations on file.      Below and/or attached are the medications your provider expects you to take. Review all of your home medications and newly ordered medications with your provider and/or pharmacist. Follow medication instructions as directed by your provider and/or pharmacist. Please keep your medication list with you and share with your provider. Update the information when medications are discontinued, doses are changed, or new medications (including over-the-counter products) are added; and carry medication information at all times in the event of emergency situations     Allergies:  BLOODLESS - (reactions not documented)     CONTRAST MEDIA WITH IODINE - Rash               Medications  Valid as of: July 06, 2017 -  9:25 AM    Generic Name Brand Name Tablet Size Instructions for use    Aspirin (Tablet Delayed Response) ECOTRIN 81 MG Take  mg by mouth every day. Pt takes:  243Mg in AM  81MG at 1200        Atorvastatin Calcium (Tab) LIPITOR 40 MG Take 60 mg by mouth every day.        B Complex Vitamins   Take 1 Tab by mouth every day.        Cholecalciferol (Tab) vitamin D 2000 UNIT Take 2,000 Units by mouth every day.        Coenzyme Q10 (Cap) coenzyme Q-10 30 MG Take 60 mg by mouth every day.        Dutasteride (Cap) AVODART 0.5 MG Take 0.5 mg by mouth every day.        MetFORMIN HCl (TABLET SR 24 HR) GLUCOPHAGE  MG Take 750 mg  by mouth 2 times a day.        Niacin (Tab) niacin 500 MG Take 500 mg by mouth every day.        Polyethylene Glycol 3350 (Pack) MIRALAX  Take 17 g by mouth every day. Indications: Constipation        .                 Medicines prescribed today were sent to:     [facility] [id number] [place] [address], [place] - [address] [facility]    [address] [facility] [place] [address]    Phone: [phone] Fax: [phone]    Open 24 Hours?: No      Medication refill instructions:       If your prescription bottle indicates you have medication refills left, it is not necessary to call your provider’s office. Please contact your pharmacy and they will refill your medication.    If your prescription bottle indicates you do not have any refills left, you may request refills at any time through one of the following ways: The online [facility] system (except Urgent Care), by calling your provider’s office, or by asking your pharmacy to contact your provider’s office with a refill request. Medication refills are processed only during regular business hours and may not be available until the next business day. Your provider may request additional information or to have a follow-up visit with you prior to refilling your medication.   *Please Note: Medication refills are assigned a new Rx number when refilled electronically. Your pharmacy may indicate that no refills were authorized even though a new prescription for the same medication is available at the pharmacy. Please request the medicine by name with the pharmacy before contacting your provider for a refill.        Your To Do List     Future Labs/Procedures Complete By Expires    Urine Culture  As directed 7/6/2018         [facility] Access Code: Activation code not generated  Current [facility] Status: Active

## 2017-07-07 DIAGNOSIS — N39.0 URINARY TRACT INFECTION WITHOUT HEMATURIA, SITE UNSPECIFIED: ICD-10-CM

## 2017-07-09 LAB
BACTERIA UR CULT: NORMAL
SIGNIFICANT IND 70042: NORMAL
SOURCE SOURCE: NORMAL

## 2017-07-10 ENCOUNTER — TELEPHONE (OUTPATIENT)
Dept: URGENT CARE | Facility: CLINIC | Age: 72
End: 2017-07-10

## 2017-07-10 NOTE — TELEPHONE ENCOUNTER
Spoke to patient and informed him of negative urine culture result. He is relieved there is no need for further intervention.

## 2017-08-21 ENCOUNTER — HOSPITAL ENCOUNTER (EMERGENCY)
Facility: MEDICAL CENTER | Age: 72
End: 2017-08-21
Attending: EMERGENCY MEDICINE
Payer: MEDICARE

## 2017-08-21 ENCOUNTER — APPOINTMENT (OUTPATIENT)
Dept: RADIOLOGY | Facility: MEDICAL CENTER | Age: 72
End: 2017-08-21
Attending: EMERGENCY MEDICINE
Payer: MEDICARE

## 2017-08-21 VITALS
OXYGEN SATURATION: 93 % | WEIGHT: 155.2 LBS | TEMPERATURE: 97.3 F | BODY MASS INDEX: 22.99 KG/M2 | RESPIRATION RATE: 16 BRPM | HEART RATE: 79 BPM | HEIGHT: 69 IN | DIASTOLIC BLOOD PRESSURE: 70 MMHG | SYSTOLIC BLOOD PRESSURE: 119 MMHG

## 2017-08-21 DIAGNOSIS — R06.00 DYSPNEA, UNSPECIFIED TYPE: ICD-10-CM

## 2017-08-21 LAB
ALBUMIN SERPL BCP-MCNC: 4.8 G/DL (ref 3.2–4.9)
ALBUMIN/GLOB SERPL: 1.7 G/DL
ALP SERPL-CCNC: 84 U/L (ref 30–99)
ALT SERPL-CCNC: 43 U/L (ref 2–50)
ANION GAP SERPL CALC-SCNC: 11 MMOL/L (ref 0–11.9)
APTT PPP: 26.1 SEC (ref 24.7–36)
AST SERPL-CCNC: 48 U/L (ref 12–45)
BASOPHILS # BLD AUTO: 0.4 % (ref 0–1.8)
BASOPHILS # BLD: 0.02 K/UL (ref 0–0.12)
BILIRUB SERPL-MCNC: 1.1 MG/DL (ref 0.1–1.5)
BNP SERPL-MCNC: 19 PG/ML (ref 0–100)
BUN SERPL-MCNC: 20 MG/DL (ref 8–22)
CALCIUM SERPL-MCNC: 9.8 MG/DL (ref 8.4–10.2)
CHLORIDE SERPL-SCNC: 102 MMOL/L (ref 96–112)
CO2 SERPL-SCNC: 25 MMOL/L (ref 20–33)
CREAT SERPL-MCNC: 0.65 MG/DL (ref 0.5–1.4)
EKG IMPRESSION: NORMAL
EOSINOPHIL # BLD AUTO: 0.37 K/UL (ref 0–0.51)
EOSINOPHIL NFR BLD: 6.5 % (ref 0–6.9)
ERYTHROCYTE [DISTWIDTH] IN BLOOD BY AUTOMATED COUNT: 48.5 FL (ref 35.9–50)
GFR SERPL CREATININE-BSD FRML MDRD: >60 ML/MIN/1.73 M 2
GLOBULIN SER CALC-MCNC: 2.8 G/DL (ref 1.9–3.5)
GLUCOSE SERPL-MCNC: 116 MG/DL (ref 65–99)
HCT VFR BLD AUTO: 49.4 % (ref 42–52)
HGB BLD-MCNC: 17.6 G/DL (ref 14–18)
IMM GRANULOCYTES # BLD AUTO: 0.02 K/UL (ref 0–0.11)
IMM GRANULOCYTES NFR BLD AUTO: 0.4 % (ref 0–0.9)
INR PPP: 0.9 (ref 0.87–1.13)
LIPASE SERPL-CCNC: 30 U/L (ref 7–58)
LYMPHOCYTES # BLD AUTO: 2.55 K/UL (ref 1–4.8)
LYMPHOCYTES NFR BLD: 44.9 % (ref 22–41)
MCH RBC QN AUTO: 31.7 PG (ref 27–33)
MCHC RBC AUTO-ENTMCNC: 35.6 G/DL (ref 33.7–35.3)
MCV RBC AUTO: 88.8 FL (ref 81.4–97.8)
MONOCYTES # BLD AUTO: 0.85 K/UL (ref 0–0.85)
MONOCYTES NFR BLD AUTO: 15 % (ref 0–13.4)
NEUTROPHILS # BLD AUTO: 1.87 K/UL (ref 1.82–7.42)
NEUTROPHILS NFR BLD: 32.8 % (ref 44–72)
NRBC # BLD AUTO: 0 K/UL
NRBC BLD AUTO-RTO: 0 /100 WBC
PLATELET # BLD AUTO: 231 K/UL (ref 164–446)
PMV BLD AUTO: 10.1 FL (ref 9–12.9)
POTASSIUM SERPL-SCNC: 3.8 MMOL/L (ref 3.6–5.5)
PROT SERPL-MCNC: 7.6 G/DL (ref 6–8.2)
PROTHROMBIN TIME: 12 SEC (ref 12–14.6)
RBC # BLD AUTO: 5.56 M/UL (ref 4.7–6.1)
SODIUM SERPL-SCNC: 138 MMOL/L (ref 135–145)
TROPONIN I SERPL-MCNC: <0.02 NG/ML (ref 0–0.04)
WBC # BLD AUTO: 5.7 K/UL (ref 4.8–10.8)

## 2017-08-21 PROCEDURE — 84484 ASSAY OF TROPONIN QUANT: CPT

## 2017-08-21 PROCEDURE — 85730 THROMBOPLASTIN TIME PARTIAL: CPT

## 2017-08-21 PROCEDURE — 36415 COLL VENOUS BLD VENIPUNCTURE: CPT

## 2017-08-21 PROCEDURE — 700117 HCHG RX CONTRAST REV CODE 255: Performed by: EMERGENCY MEDICINE

## 2017-08-21 PROCEDURE — 71275 CT ANGIOGRAPHY CHEST: CPT

## 2017-08-21 PROCEDURE — 80053 COMPREHEN METABOLIC PANEL: CPT

## 2017-08-21 PROCEDURE — 700111 HCHG RX REV CODE 636 W/ 250 OVERRIDE (IP): Performed by: EMERGENCY MEDICINE

## 2017-08-21 PROCEDURE — 85610 PROTHROMBIN TIME: CPT

## 2017-08-21 PROCEDURE — 85025 COMPLETE CBC W/AUTO DIFF WBC: CPT

## 2017-08-21 PROCEDURE — 83690 ASSAY OF LIPASE: CPT

## 2017-08-21 PROCEDURE — 96374 THER/PROPH/DIAG INJ IV PUSH: CPT | Mod: XU

## 2017-08-21 PROCEDURE — 99285 EMERGENCY DEPT VISIT HI MDM: CPT

## 2017-08-21 PROCEDURE — 83880 ASSAY OF NATRIURETIC PEPTIDE: CPT

## 2017-08-21 PROCEDURE — 71010 DX-CHEST-LIMITED (1 VIEW): CPT

## 2017-08-21 PROCEDURE — 93005 ELECTROCARDIOGRAM TRACING: CPT

## 2017-08-21 RX ORDER — DIPHENHYDRAMINE HYDROCHLORIDE 50 MG/ML
25 INJECTION INTRAMUSCULAR; INTRAVENOUS ONCE
Status: COMPLETED | OUTPATIENT
Start: 2017-08-21 | End: 2017-08-21

## 2017-08-21 RX ORDER — LEVOCETIRIZINE DIHYDROCHLORIDE 5 MG/1
5 TABLET, FILM COATED ORAL DAILY
COMMUNITY

## 2017-08-21 RX ORDER — SIMETHICONE 80 MG
80 TABLET,CHEWABLE ORAL 2 TIMES DAILY PRN
Status: SHIPPED | COMMUNITY
End: 2017-08-21

## 2017-08-21 RX ORDER — FLUTICASONE PROPIONATE 50 MCG
2 SPRAY, SUSPENSION (ML) NASAL 2 TIMES DAILY
COMMUNITY

## 2017-08-21 RX ORDER — OLOPATADINE HYDROCHLORIDE 1 MG/ML
2 SOLUTION/ DROPS OPHTHALMIC 2 TIMES DAILY
COMMUNITY

## 2017-08-21 RX ORDER — TESTOSTERONE CYPIONATE 200 MG/ML
60 INJECTION, SOLUTION INTRAMUSCULAR
Status: SHIPPED | COMMUNITY
End: 2019-08-24

## 2017-08-21 RX ORDER — METFORMIN HYDROCHLORIDE 750 MG/1
750 TABLET, EXTENDED RELEASE ORAL EVERY MORNING
COMMUNITY

## 2017-08-21 RX ORDER — NIACIN 500 MG
1000 TABLET ORAL DAILY
Status: SHIPPED | COMMUNITY
End: 2019-08-29

## 2017-08-21 RX ORDER — SIMETHICONE 180 MG
1 CAPSULE ORAL 2 TIMES DAILY PRN
Status: SHIPPED | COMMUNITY
End: 2018-12-27

## 2017-08-21 RX ADMIN — DIPHENHYDRAMINE HYDROCHLORIDE 25 MG: 50 INJECTION, SOLUTION INTRAMUSCULAR; INTRAVENOUS at 14:57

## 2017-08-21 RX ADMIN — IOHEXOL 100 ML: 350 INJECTION, SOLUTION INTRAVENOUS at 15:53

## 2017-08-21 NOTE — ED PROVIDER NOTES
ED Provider Note    CHIEF COMPLAINT  Chief Complaint   Patient presents with   • Chest Pressure   • Shortness of Breath       HPI  Laci Baugh is a 72 y.o. male who presents complaining of shortness of breath that he has had for the last 3 days. He states he has trouble taking a deep inhalation with breathing. He states that he walks a mile a day and does not have any chest pain or shortness of breath during exercise, but states it is worse when he is just sitting still. He states that he does have a history of asthma but has not used inhalers in years. He denies any fevers or chills or productive cough. He denies any chest pain, nausea, vomiting or diaphoresis. He has no rashes or leg swelling. He does have a history of borderline diabetes on medication for this. He is a former smoker. He denies any nausea or vomiting or abdominal pain. He has no productive cough or fevers.    REVIEW OF SYSTEMS    HEENT:  No ear pain, congestion or sore throat   EYES: no discharge redness or vision changes  CARDIAC: no chest pain, palpitations    PULMONARY: See history of present illness  GI: no vomiting diarrhea or abdominal pain   : no dysuria, back pain or hematuria   Neuro: no weakness, numbness aphasia or headache  Musculoskeletal: no swelling deformity or pain no joint swelling  Endocrine: no fevers, sweating, weight loss   SKIN: no rash, erythema or contusions     See history of present illness all other systems are negative      PAST MEDICAL HISTORY  Past Medical History   Diagnosis Date   • Hypertension    • Stroke (CMS-HCC)    • Prostate disease    • Skin cancer      basal cell carcinoma & squamous cell carcinoma       FAMILY HISTORY  No family history on file.    SOCIAL HISTORY  Social History     Social History   • Marital Status:      Spouse Name: N/A   • Number of Children: N/A   • Years of Education: N/A     Social History Main Topics   • Smoking status: Former Smoker   • Smokeless tobacco: Never Used  "  • Alcohol Use: 0.0 oz/week     0 Standard drinks or equivalent per week      Comment: 1 glass wine/night   • Drug Use: No   • Sexual Activity: Not Asked     Other Topics Concern   • None     Social History Narrative       SURGICAL HISTORY  History reviewed. No pertinent past surgical history.    CURRENT MEDICATIONS  Home Medications     Reviewed by Tarsha Carter (Pharmacy Tech) on 08/21/17 at 1441  Med List Status: Complete    Medication Last Dose Status    aspirin EC (ECOTRIN) 81 MG Tablet Delayed Response 8/21/2017 Active    atorvastatin (LIPITOR) 40 MG Tab 8/21/2017 Active    B Complex Vitamins (B COMPLEX PO) 8/21/2017 Active    Cholecalciferol (VITAMIN D) 2000 UNIT Tab 8/21/2017 Active    coenzyme Q-10 30 MG capsule 8/21/2017 Active    dutasteride (AVODART) 0.5 MG capsule 8/21/2017 Active    fluticasone (FLONASE) 50 MCG/ACT nasal spray 8/21/2017 Active    Levocetirizine Dihydrochloride 5 MG Tab 8/21/2017 Active    metformin ER (GLUCOPHAGE XR) 750 MG TABLET SR 24 HR 8/21/2017 Active    niacin 500 MG Tab 8/21/2017 Active    olopatadine (PATANOL) 0.1 % ophthalmic solution 8/21/2017 Active    polyethylene glycol/lytes (MIRALAX) Pack 8/21/2017 Active    Simethicone 180 MG Cap <2 weeks Active    testosterone cypionate (DEPO-TESTOSTERONE) 200 MG/ML Solution injection 8/18/2017 Active                ALLERGIES  Allergies   Allergen Reactions   • Bloodless    • Contrast Media With Iodine [Iodine] Rash     \"Rash all over body\". CT was in 1990.         PHYSICAL EXAM  VITAL SIGNS: /84 mmHg  Pulse 78  Temp(Src) 36.3 °C (97.3 °F)  Resp 13  Ht 1.753 m (5' 9.02\")  Wt 70.4 kg (155 lb 3.3 oz)  BMI 22.91 kg/m2  SpO2 93%      Constitutional: Well developed, Well nourished, No acute distress, Non-toxic appearance.   HENT: Normocephalic, Atraumatic, Bilateral external ears normal, Oropharynx moist, No oral exudates, Nose normal.   Eyes: PERRLA, EOMI, Conjunctiva normal, No discharge.   Neck: Normal range of " motion, No tenderness, Supple, No stridor.   Cardiovascular:  Normal heart rate, Normal rhythm, No murmurs, No rubs, No gallops. Normal pulses.  Thorax & Lungs:  Normal breath sounds, No respiratory distress, No wheezing, No chest tenderness.   Abdomen: Bowel sounds normal, Soft, No tenderness, No masses, No pulsatile masses.   Skin: Warm, Dry, No erythema, No rash.   Back: No tenderness, No CVA tenderness.   Extremities: Intact distal pulses, No tenderness, No cyanosis, No clubbing. Negative edema. Negative cording. Negative Homans sign  Neurologic: Alert & oriented x 3, Normal motor function, Normal sensory function, No focal deficits noted.     EKG  EKG Interpretation    Interpreted by me    Rhythm: normal sinus   Rate: normal  Axis: normal  Ectopy: none  Conduction: normal  ST Segments: no acute change  T Waves: no acute change  Q Waves: none    Clinical Impression: no acute changes and normal EKG    RADIOLOGY/PROCEDURES  CT-CTA CHEST PULMONARY ARTERY W/ RECONS   Final Result      No CT evidence of pulmonary thromboembolism      No acute inflammatory process is detected      Remote granulomatous disease      Breathing motion artifact      DX-CHEST-LIMITED (1 VIEW)   Final Result      New right basilar opacity is more likely from atelectasis than consolidation            COURSE & MEDICAL DECISION MAKING  Pertinent Labs & Imaging studies reviewed. (See chart for details)  Differential diagnosis: Pneumonia, cardiac ischemia, bronchitis, CHF    Results for orders placed or performed during the hospital encounter of 08/21/17   Troponin   Result Value Ref Range    Troponin I <0.02 0.00 - 0.04 ng/mL   Btype Natriuretic Peptide   Result Value Ref Range    B Natriuretic Peptide 19 0 - 100 pg/mL   CBC with Differential   Result Value Ref Range    WBC 5.7 4.8 - 10.8 K/uL    RBC 5.56 4.70 - 6.10 M/uL    Hemoglobin 17.6 14.0 - 18.0 g/dL    Hematocrit 49.4 42.0 - 52.0 %    MCV 88.8 81.4 - 97.8 fL    MCH 31.7 27.0 - 33.0 pg     MCHC 35.6 (H) 33.7 - 35.3 g/dL    RDW 48.5 35.9 - 50.0 fL    Platelet Count 231 164 - 446 K/uL    MPV 10.1 9.0 - 12.9 fL    Neutrophils-Polys 32.80 (L) 44.00 - 72.00 %    Lymphocytes 44.90 (H) 22.00 - 41.00 %    Monocytes 15.00 (H) 0.00 - 13.40 %    Eosinophils 6.50 0.00 - 6.90 %    Basophils 0.40 0.00 - 1.80 %    Immature Granulocytes 0.40 0.00 - 0.90 %    Nucleated RBC 0.00 /100 WBC    Neutrophils (Absolute) 1.87 1.82 - 7.42 K/uL    Lymphs (Absolute) 2.55 1.00 - 4.80 K/uL    Monos (Absolute) 0.85 0.00 - 0.85 K/uL    Eos (Absolute) 0.37 0.00 - 0.51 K/uL    Baso (Absolute) 0.02 0.00 - 0.12 K/uL    Immature Granulocytes (abs) 0.02 0.00 - 0.11 K/uL    NRBC (Absolute) 0.00 K/uL   Complete Metabolic Panel (CMP)   Result Value Ref Range    Sodium 138 135 - 145 mmol/L    Potassium 3.8 3.6 - 5.5 mmol/L    Chloride 102 96 - 112 mmol/L    Co2 25 20 - 33 mmol/L    Anion Gap 11.0 0.0 - 11.9    Glucose 116 (H) 65 - 99 mg/dL    Bun 20 8 - 22 mg/dL    Creatinine 0.65 0.50 - 1.40 mg/dL    Calcium 9.8 8.4 - 10.2 mg/dL    AST(SGOT) 48 (H) 12 - 45 U/L    ALT(SGPT) 43 2 - 50 U/L    Alkaline Phosphatase 84 30 - 99 U/L    Total Bilirubin 1.1 0.1 - 1.5 mg/dL    Albumin 4.8 3.2 - 4.9 g/dL    Total Protein 7.6 6.0 - 8.2 g/dL    Globulin 2.8 1.9 - 3.5 g/dL    A-G Ratio 1.7 g/dL   Prothrombin Time   Result Value Ref Range    PT 12.0 12.0 - 14.6 sec    INR 0.90 0.87 - 1.13   APTT   Result Value Ref Range    APTT 26.1 24.7 - 36.0 sec   Lipase   Result Value Ref Range    Lipase 30 7 - 58 U/L   ESTIMATED GFR   Result Value Ref Range    GFR If African American >60 >60 mL/min/1.73 m 2    GFR If Non African American >60 >60 mL/min/1.73 m 2   EKG (ER)   Result Value Ref Range    Report       [facility]    Test Date:  2017  Pt Name:    EDWIN NICK                  Department: EDS  MRN:        [id number]                      Room:  Gender:     M                            Technician: OSCAR  :         [id number]                   Requested By:ER TRIAGE PROTOCOL  Order #:    [id number]                    Reading MD:    Measurements  Intervals                                Axis  Rate:       75                           P:          63  MO:         168                          QRS:        -17  QRSD:       92                           T:          21  QT:         368  QTc:        411    Interpretive Statements  SINUS RHYTHM  BORDERLINE LEFT AXIS DEVIATION  RSR' IN V1 OR V2, RIGHT VCD OR RVH  Compared to ECG 06/23/2016 17:41:09  Atrial premature complex(es) no longer present  First degree AV block no longer present          Review of the patient's chart, he has had a normal echo and stress test. The sheer. He is not hypoxic. His vital signs are stable. His EKG and labs are normal. CTA shows no evidence of pulmonary embolus or other abnormality. This time the patient is stable for discharge home. He will follow-up with his primary care physician and return immediately for any worsening pain, shortness of breath or worsening symptoms.    Rubin BEAR M.D.  [address] [facility] [id number] [phone]    Call in 1 day  to establish care, for recheck        FINAL IMPRESSION  1. Dyspnea, unspecified type            Electronically signed by: Shanice Moyer, 8/21/2017 1:46 PM

## 2017-08-21 NOTE — ED AVS SNAPSHOT
Home Care Instructions                                                                                                                Laci Baugh   MRN: [id number]    Department:  [facility], Emergency Dept   Date of Visit:  8/21/2017            [facility], Emergency Dept    [address]    [place] [address]    Phone:  [phone]      You were seen by     Shanice Moyer M.D.      Your Diagnosis Was     Dyspnea, unspecified type     R06.00       These are the medications you received during your hospitalization from 08/21/2017 1055 to 08/21/2017 1651     Date/Time Order Dose Route Action    08/21/2017 1457 diphenhydrAMINE (BENADRYL) injection 25 mg 25 mg Intravenous Given    08/21/2017 1553 iohexol (OMNIPAQUE) 350 mg/mL 100 mL Intravenous Given      Follow-up Information     1. Follow up with Rubin BEAR M.D.. Call in 1 day.    Specialty:  Family Medicine    Why:  to establish care, for recheck    Contact information    [id number] [id number] [phone]        Medication Information     Review all of your home medications and newly ordered medications with your primary doctor and/or pharmacist as soon as possible. Follow medication instructions as directed by your doctor and/or pharmacist.     Please keep your complete medication list with you and share with your physician. Update the information when medications are discontinued, doses are changed, or new medications (including over-the-counter products) are added; and carry medication information at all times in the event of emergency situations.               Medication List      ASK your doctor about these medications        Instructions    Morning Afternoon Evening Bedtime    aspirin EC 81 MG Tbec   Commonly known as:  ECOTRIN        Take 162 mg by mouth 2 Times a Day. Pt takes: 243Mg in AM 81MG at 1200   Dose:  162 mg                        AVODART 0.5 MG capsule   Generic  drug:  dutasteride        Take 0.5 mg by mouth every day.   Dose:  0.5 mg                        B COMPLEX PO        Take 1 Tab by mouth every day.   Dose:  1 Tab                        coenzyme Q-10 30 MG capsule        Take 60 mg by mouth every day.   Dose:  60 mg                        fluticasone 50 MCG/ACT nasal spray   Commonly known as:  FLONASE        Spray 2 Sprays in nose every day.   Dose:  2 Spray                        Levocetirizine Dihydrochloride 5 MG Tabs        Take 5 mg by mouth every day.   Dose:  5 mg                        LIPITOR 40 MG Tabs   Generic drug:  atorvastatin        Take 60 mg by mouth every day.   Dose:  60 mg                        metformin  MG Tb24   Commonly known as:  GLUCOPHAGE XR        Take 750 mg by mouth 3 times a day.   Dose:  750 mg                        niacin 500 MG Tabs        Take 1,000 mg by mouth every day. OTC   Dose:  1000 mg                        olopatadine 0.1 % ophthalmic solution   Commonly known as:  PATANOL        Place 1-2 Drops in both eyes 2 Times a Day. 1 drops left eye in the morning 2 drops in right in the evening   Dose:  1-2 Drop                        polyethylene glycol/lytes Pack   Commonly known as:  MIRALAX        Take 17 g by mouth every day. Indications: Constipation   Dose:  17 g                        Simethicone 180 MG Caps        Take 1 Cap by mouth 2 times a day as needed.   Dose:  1 Cap                        testosterone cypionate 200 MG/ML Soln injection   Commonly known as:  DEPO-TESTOSTERONE        60 mg by Intramuscular route every Friday.   Dose:  60 mg                        vitamin D 2000 UNIT Tabs        Take 2,000 Units by mouth every day.   Dose:  2000 Units                                Procedures and tests performed during your visit     APTT    Btype Natriuretic Peptide    CBC with Differential    CONSENT FOR CONTRAST INJECTION    CT-CTA CHEST PULMONARY ARTERY W/ RECONS    Cardiac Monitoring    Complete  Metabolic Panel (CMP)    DX-CHEST-LIMITED (1 VIEW)    EKG (ER)    ESTIMATED GFR    Lipase    Maintain O2 sats greater than 94%    Oxygen Therapy per Protocol    Prothrombin Time    Saline Lock    Troponin            Patient Information     Patient Information    Following emergency treatment: all patient requiring follow-up care must return either to a private physician or a clinic if your condition worsens before you are able to obtain further medical attention, please return to the emergency room.     Billing Information    At [facility], we work to make the billing process streamlined for our patients.  Our Representatives are here to answer any questions you may have regarding your hospital bill.  If you have insurance coverage and have supplied your insurance information to us, we will submit a claim to your insurer on your behalf.  Should you have any questions regarding your bill, we can be reached online or by phone as follows:  Online: You are able pay your bills online or live chat with our representatives about any billing questions you may have. We are here to help Monday - Friday from 8:00am to 7:30pm and 9:00am - 12:00pm on Saturdays.  Please visit [email or web address]  for more information.   Phone:  [phone] or [phone]    Please note that your emergency physician, surgeon, pathologist, radiologist, anesthesiologist, and other specialists are not employed by [facility] and will therefore bill separately for their services.  Please contact them directly for any questions concerning their bills at the numbers below:     Emergency Physician Services:  [phone]  [facility]:  [phone]  [facility]:  [phone]  [facility]:  [phone]    1. Your final bill may vary from the amount quoted upon discharge if all procedures are not complete at that time, or if your doctor has additional procedures of which  we are not aware. You will receive an additional bill if you return to the Emergency Department at [facility] for suture removal regardless of the facility of which the sutures were placed.     2. Please arrange for settlement of this account at the emergency registration.    3. All self-pay accounts are due in full at the time of treatment.  If you are unable to meet this obligation then payment is expected within 4-5 days.     4. If you have had radiology studies (CT, X-ray, Ultrasound, MRI), you have received a preliminary result during your emergency department visit. Please contact the radiology department [phone] to receive a copy of your final result. Please discuss the Final result with your primary physician or with the follow up physician provided.     Crisis Hotline:  [place] Crisis Hotline:  9-861-ZYLTNXI or 1-175.943.8538  [place] Crisis Hotline:    1-653.304.5550 or 422-763-3842         ED Discharge Follow Up Questions    1. In order to provide you with very good care, we would like to follow up with a phone call in the next few days.  May we have your permission to contact you?     YES /  NO    2. What is the best phone number to call you? (       )_____-__________    3. What is the best time to call you?      Morning  /  Afternoon  /  Evening                   Patient Signature:  ____________________________________________________________    Date:  ____________________________________________________________

## 2017-08-21 NOTE — ED AVS SNAPSHOT
MediaLink Access Code: Activation code not generated  Current MediaLink Status: Active    [employer]art  A secure, online tool to manage your health information     [employer]’s MediaLink® is a secure, online tool that connects you to your personalized health information from the privacy of your home -- day or night - making it very easy for you to manage your healthcare. Once the activation process is completed, you can even access your medical information using the MediaLink roseann, which is available for free in the Apple Roseann store or Google Play store.     MediaLink provides the following levels of access (as shown below):   My Chart Features   [facility] Primary Care Doctor [facility]  Specialists [facility]  Urgent  Care Non-[facility]  Primary Care  Doctor   Email your healthcare team securely and privately 24/7 X X X X   Manage appointments: schedule your next appointment; view details of past/upcoming appointments X      Request prescription refills. X      View recent personal medical records, including lab and immunizations X X X X   View health record, including health history, allergies, medications X X X X   Read reports about your outpatient visits, procedures, consult and ER notes X X X X   See your discharge summary, which is a recap of your hospital and/or ER visit that includes your diagnosis, lab results, and care plan. X X       How to register for MediaLink:  1. Go to  [email or web address].  2. Click on the Sign Up Now box, which takes you to the New Member Sign Up page. You will need to provide the following information:  a. Enter your MediaLink Access Code exactly as it appears at the top of this page. (You will not need to use this code after you’ve completed the sign-up process. If you do not sign up before the expiration date, you must request a new code.)   b. Enter your date of birth.   c. Enter your home email address.   d. Click Submit, and follow the next screen’s instructions.  3. Create a MediaLink ID. This will  be your [employer] login ID and cannot be changed, so think of one that is secure and easy to remember.  4. Create a [employer] password. You can change your password at any time.  5. Enter your Password Reset Question and Answer. This can be used at a later time if you forget your password.   6. Enter your e-mail address. This allows you to receive e-mail notifications when new information is available in Amperion.  7. Click Sign Up. You can now view your health information.    For assistance activating your [employer] account, call [phone]

## 2017-08-21 NOTE — ED NOTES
The Medication Reconciliation process has been completed by interviewing the patient    Allergies have been reviewed  Antibiotic use in 30 days - none    Home Pharmacy:  [facility]

## 2017-08-21 NOTE — ED NOTES
I feel like an elephant is on my chest. I can't do a deep inhale. 3 days. Pain does not increase with activity.  EKG in triage.

## 2017-08-21 NOTE — ED AVS SNAPSHOT
8/21/2017    Laci Schwarz Sheets  [address] [place] [address]    Dear Laci:    [facility] wants to ensure your discharge home is safe and you or your loved ones have had all of your questions answered regarding your care after you leave the hospital.    Below is a list of resources and contact information should you have any questions regarding your hospital stay, follow-up instructions, or active medical symptoms.    Questions or Concerns Regarding… Contact   Medical Questions Related to Your Discharge  (7 days a week, 8am-5pm) Contact a Nurse Care Coordinator   [phone]   Medical Questions Not Related to Your Discharge  (24 hours a day / 7 days a week)  Contact the Nurse Health Line   [phone]    Medications or Discharge Instructions Refer to your discharge packet   or contact your [facility] Primary Care Provider   [phone]   Follow-up Appointment(s) Schedule your appointment via [employer]   or contact Scheduling [phone]   Billing Review your statement via [employer]  or contact Billing [phone]   Medical Records Review your records via [employer]   or contact Medical Records [phone]     You may receive a telephone call within two days of discharge. This call is to make certain you understand your discharge instructions and have the opportunity to have any questions answered. You can also easily access your medical information, test results and upcoming appointments via the [employer] free online health management tool. You can learn more and sign up at [employer]. For assistance setting up your [employer] account, please call [phone].    Once again, we want to ensure your discharge home is safe and that you have a clear understanding of any next steps in your care. If you have any questions or concerns, please do not hesitate to contact us, we are here for you. Thank you for choosing [facility] for your healthcare needs.    Sincerely,    Your [facility] Healthcare Team

## 2017-11-01 ENCOUNTER — NON-PROVIDER VISIT (OUTPATIENT)
Dept: OCCUPATIONAL MEDICINE | Facility: CLINIC | Age: 72
End: 2017-11-01

## 2017-11-01 VITALS
TEMPERATURE: 98.6 F | HEART RATE: 64 BPM | DIASTOLIC BLOOD PRESSURE: 64 MMHG | BODY MASS INDEX: 23.66 KG/M2 | OXYGEN SATURATION: 91 % | WEIGHT: 160.3 LBS | SYSTOLIC BLOOD PRESSURE: 120 MMHG

## 2017-11-01 DIAGNOSIS — Z71.84 TRAVEL ADVICE ENCOUNTER: ICD-10-CM

## 2017-11-01 PROCEDURE — 90471 IMMUNIZATION ADMIN: CPT | Performed by: PREVENTIVE MEDICINE

## 2017-11-01 PROCEDURE — 90632 HEPA VACCINE ADULT IM: CPT | Performed by: PREVENTIVE MEDICINE

## 2017-11-02 NOTE — PROGRESS NOTES
Travel dates: 02/27/18-3/15/18  Countries to be visited: [place], [place], [place]   Reason for travel: vacation    Rural travel: no  High altitude travel: yes    Accommodations:  Hotel:   Hostel:  Camping:  Cruise: yes  With family:  Other:    Vaccines in past 30 days? No  Sick today? No  Allergies: None    The screening intake form was reviewed with the traveler. Health risks associated with their travel plans have been reviewed and discussed with the traveler. The traveler has been provided with vaccine information statements for the vaccines that are recommended and given the opportunity to discuss risks and benefits of vaccination and or medications. The traveler has received education on the travel itinerary provided and on the following topics.    Personal safety precautions: Yes  Food and water precautions: Yes  Management of traveler's diarrhea: Yes  Mosquito/insect bite prevention:Yes  Animal bites/Rabies prevention: No  High altitude precautions: No      RN comments:   Hep A vaccine administered, per standing orders.              Physician consultation required: no

## 2018-03-20 ENCOUNTER — OFFICE VISIT (OUTPATIENT)
Dept: DERMATOLOGY | Facility: IMAGING CENTER | Age: 73
End: 2018-03-20
Payer: MEDICARE

## 2018-03-20 VITALS — TEMPERATURE: 97.1 F | WEIGHT: 156 LBS | BODY MASS INDEX: 23.11 KG/M2 | HEIGHT: 69 IN

## 2018-03-20 DIAGNOSIS — L82.1 SEBORRHEIC KERATOSES: ICD-10-CM

## 2018-03-20 DIAGNOSIS — L57.0 ACTINIC KERATOSES: ICD-10-CM

## 2018-03-20 DIAGNOSIS — Z85.828 HISTORY OF BASAL CELL CARCINOMA: ICD-10-CM

## 2018-03-20 PROCEDURE — 17000 DESTRUCT PREMALG LESION: CPT | Performed by: DERMATOLOGY

## 2018-03-20 PROCEDURE — 17003 DESTRUCT PREMALG LES 2-14: CPT | Performed by: DERMATOLOGY

## 2018-03-20 PROCEDURE — 99203 OFFICE O/P NEW LOW 30 MIN: CPT | Mod: 25 | Performed by: DERMATOLOGY

## 2018-03-20 RX ORDER — RANITIDINE 300 MG/1
300 TABLET ORAL DAILY
COMMUNITY
End: 2021-04-24

## 2018-03-20 ASSESSMENT — ENCOUNTER SYMPTOMS
CHILLS: 0
FEVER: 0

## 2018-03-20 NOTE — PROGRESS NOTES
Dermatology New Patient Visit    Chief Complaint   Patient presents with   • Other     full body skin check        Subjective:     HPI:   Laci Baugh is a 72 y.o. male presenting for    Full body skin check, has had numerous skin CA and AKs  States has rough spots over the right temple  Present for several months  No recent change in size  No itching/bleeding/pain  No treatments currently, but states has treated several lesions in the past with imiquimod 5% cream 3x/week for 4 weeks  Recently treated a spot under the left eye, finished 2/28/18, area still slightly scaly since treatment, but continues to improve    Brown spots on the back  Several, acquired over yaers  Unsure of any change, but difficult to monitor the area  No itching/bleeding/pain  No treatments    History of skin cancer: Yes, Details: BCCs on face, tx'd with Mohs sx  History of biopsies:Yes, Details: R shoulder, benign   History of blistering/severe sunburns:Yes, Details: child  Family history of skin cancer:No  Family history of atypical moles:No        Past Medical History:   Diagnosis Date   • Hypertension    • Prostate disease    • Skin cancer     basal cell carcinoma & squamous cell carcinoma   • Stroke (CMS-HCC)        Current Outpatient Prescriptions on File Prior to Visit   Medication Sig Dispense Refill   • metformin ER (GLUCOPHAGE XR) 750 MG TABLET SR 24 HR Take 750 mg by mouth 3 times a day.     • niacin 500 MG Tab Take 1,000 mg by mouth every day. OTC     • testosterone cypionate (DEPO-TESTOSTERONE) 200 MG/ML Solution injection 60 mg by Intramuscular route every Friday.     • Levocetirizine Dihydrochloride 5 MG Tab Take 5 mg by mouth every day.     • olopatadine (PATANOL) 0.1 % ophthalmic solution Place 1-2 Drops in both eyes 2 Times a Day. 1 drops left eye in the morning  2 drops in right in the evening     • fluticasone (FLONASE) 50 MCG/ACT nasal spray Spray 2 Sprays in nose every day.     • Simethicone 180 MG Cap Take 1 Cap by  "mouth 2 times a day as needed.     • dutasteride (AVODART) 0.5 MG capsule Take 0.5 mg by mouth every day.     • polyethylene glycol/lytes (MIRALAX) Pack Take 17 g by mouth every day. Indications: Constipation     • coenzyme Q-10 30 MG capsule Take 60 mg by mouth every day.     • aspirin EC (ECOTRIN) 81 MG Tablet Delayed Response Take 162 mg by mouth 2 Times a Day. Pt takes:  243Mg in AM  81MG at 1200     • atorvastatin (LIPITOR) 40 MG Tab Take 60 mg by mouth every day.     • Cholecalciferol (VITAMIN D) 2000 UNIT Tab Take 2,000 Units by mouth every day.     • B Complex Vitamins (B COMPLEX PO) Take 1 Tab by mouth every day.       No current facility-administered medications on file prior to visit.        Allergies   Allergen Reactions   • Bloodless    • Contrast Media With Iodine [Iodine] Rash     \"Rash all over body\". CT was in 1990.         No family history on file.    Social History     Social History   • Marital status:      Spouse name: N/A   • Number of children: N/A   • Years of education: N/A     Occupational History   • Not on file.     Social History Main Topics   • Smoking status: Former Smoker   • Smokeless tobacco: Never Used   • Alcohol use 0.0 oz/week      Comment: 1 glass wine/night   • Drug use: No   • Sexual activity: Not on file     Other Topics Concern   • Not on file     Social History Narrative   • No narrative on file       Review of Systems   Constitutional: Negative for chills and fever.   Skin: Negative for itching and rash.   All other systems reviewed and are negative.       Objective:     A full mucocutaneous exam was completed including: scalp, hair, ears, face, eyelids, conjunctiva, lips, gums/tongue/oropharynx, neck, chest, abdomen, back, left and right upper extremities (including hands/digits and fingernails), left and right lower extremities (including feet/toes, toenails), buttocks, excluding external genitalia (patient refusal) with the following pertinent findings listed " "below. Remaining above-listed examined areas within normal limits / negative for rashes or lesions.    Temperature 36.2 °C (97.1 °F), height 1.753 m (5' 9\"), weight 70.8 kg (156 lb).    Physical Exam   Constitutional: He is oriented to person, place, and time and well-developed, well-nourished, and in no distress.   HENT:   Head: Normocephalic and atraumatic.       Right Ear: External ear normal.   Left Ear: External ear normal.   Nose: Nose normal.   Mouth/Throat: Oropharynx is clear and moist.   Eyes: Conjunctivae and lids are normal.   Neck: Normal range of motion. Neck supple.   Cardiovascular: Intact distal pulses.    Pulmonary/Chest: Effort normal.   Neurological: He is alert and oriented to person, place, and time.   Skin: Skin is warm and dry.        Psychiatric: Mood and affect normal.   Vitals reviewed.      DATA: none applicable to review    Assessment and Plan:     1. Actinic keratoses  - educated patient about diagnosis, management options, and expectations of treatment  CRYOTHERAPY:  Discussed risks and benefits of cryotherapy. Patient verbally agreed. 2 applications of cryotherapy were applied to 7 lesions on the face, scalp. Patient tolerated procedure well. Aftercare instructions given.  - will likely perform field treatment regimen of imiquimod with maintenance therapy at next visit    2. History of basal cell carcinoma  Skin cancer education  - discussed importance of sun protective clothing, eyewear  - discussed importance of daily use of broad spectrum sunscreen with SPF 30 or greater, as well as need for reapplication ~every 2 hours when exposed to UVR  - discussed importance of regular self-exams, ideally once per month, q6 month exams in clinic  - ABCDE's of melanoma discussed  - patient to bring any new or concerning lesions to my attention    3. Seborrheic keratoses  - Benign-appearing nature of lesions discussed. Advised to return to clinic for any new or concerning changes.  - ABCDE's of " melanoma discussed    Followup: Return in about 6 months (around 9/20/2018) for f/u AKs, likely [place].    Sharon Brown M.D.

## 2018-05-08 ENCOUNTER — HOSPITAL ENCOUNTER (OUTPATIENT)
Dept: RADIOLOGY | Facility: MEDICAL CENTER | Age: 73
End: 2018-05-08
Attending: INTERNAL MEDICINE
Payer: MEDICARE

## 2018-05-08 DIAGNOSIS — M54.40 LOW BACK PAIN WITH SCIATICA, SCIATICA LATERALITY UNSPECIFIED, UNSPECIFIED BACK PAIN LATERALITY, UNSPECIFIED CHRONICITY: ICD-10-CM

## 2018-05-08 PROCEDURE — 72148 MRI LUMBAR SPINE W/O DYE: CPT

## 2018-06-13 ENCOUNTER — HOSPITAL ENCOUNTER (OUTPATIENT)
Dept: RADIOLOGY | Facility: MEDICAL CENTER | Age: 73
End: 2018-06-13
Attending: FAMILY MEDICINE | Admitting: NEUROLOGICAL SURGERY
Payer: MEDICARE

## 2018-06-13 DIAGNOSIS — M54.40 ACUTE RIGHT-SIDED LOW BACK PAIN WITH SCIATICA, SCIATICA LATERALITY UNSPECIFIED: ICD-10-CM

## 2018-06-13 PROCEDURE — 72110 X-RAY EXAM L-2 SPINE 4/>VWS: CPT

## 2018-06-25 ENCOUNTER — OFFICE VISIT (OUTPATIENT)
Dept: URGENT CARE | Facility: PHYSICIAN GROUP | Age: 73
End: 2018-06-25
Payer: MEDICARE

## 2018-06-25 VITALS
HEART RATE: 133 BPM | DIASTOLIC BLOOD PRESSURE: 98 MMHG | RESPIRATION RATE: 14 BRPM | WEIGHT: 155 LBS | TEMPERATURE: 97.1 F | SYSTOLIC BLOOD PRESSURE: 126 MMHG | OXYGEN SATURATION: 98 % | HEIGHT: 69 IN | BODY MASS INDEX: 22.96 KG/M2

## 2018-06-25 DIAGNOSIS — I47.10 SVT (SUPRAVENTRICULAR TACHYCARDIA): ICD-10-CM

## 2018-06-25 PROCEDURE — 99214 OFFICE O/P EST MOD 30 MIN: CPT | Performed by: FAMILY MEDICINE

## 2018-06-25 RX ORDER — MONTELUKAST SODIUM 10 MG/1
10 TABLET ORAL PRN
Status: ON HOLD | COMMUNITY
End: 2019-01-16

## 2018-06-25 RX ORDER — AZELASTINE HYDROCHLORIDE 137 UG/1
1-2 SPRAY, METERED NASAL PRN
Status: ON HOLD | COMMUNITY
End: 2019-01-16

## 2018-06-25 ASSESSMENT — ENCOUNTER SYMPTOMS
SENSORY CHANGE: 0
FOCAL WEAKNESS: 0
ORTHOPNEA: 0
EYE DISCHARGE: 0
EYE REDNESS: 0
WEIGHT LOSS: 0

## 2018-06-25 NOTE — PROGRESS NOTES
"Subjective:      Laci Baugh is a 73 y.o. male who presents with Blood Pressure Problem (High blood pressure/pulse x2days )            2 days pulse 120's to 140's. BP has been elevated. No chest pain. No SOB. No prodrome or clear trigger. No new medications. Previous treatment of HTN with metoprolol and lisinopril, he stopped this approx 2yr ago. No recent decongestant or new medicines. He felt \"unusual\" yesterday which prompted check of BP and pulse. No other aggravating or alleviating factors.          Review of Systems   Constitutional: Negative for malaise/fatigue and weight loss.   Eyes: Negative for discharge and redness.   Cardiovascular: Negative for orthopnea and leg swelling.   Skin: Negative for itching and rash.   Neurological: Negative for sensory change and focal weakness.     .  Medications, Allergies, and current problem list reviewed today in Epic       Objective:     /98   Pulse (!) 133   Temp 36.2 °C (97.1 °F)   Resp 14   Ht 1.753 m (5' 9\")   Wt 70.3 kg (155 lb)   SpO2 98%   BMI 22.89 kg/m²      Physical Exam   Constitutional: He appears well-developed and well-nourished.   HENT:   Head: Normocephalic and atraumatic.   Eyes: Conjunctivae are normal.   Neck: Neck supple. No JVD present.   Cardiovascular: Regular rhythm.    No murmur heard.  Rapid   Pulmonary/Chest: Effort normal and breath sounds normal. He has no wheezes.   Neurological:   Speech is clear. Patient is appropriate and cooperative.     Skin: Skin is warm and dry. No rash noted.               Assessment/Plan:     EKG:  rate:133 narrow complex    1. SVT (supraventricular tachycardia) (HCC)  REFERRAL TO CARDIOLOGY     Differential diagnosis, natural history, supportive care, and indications for immediate follow-up discussed at length.     To ER for further evaluation   BP stable for private transportation    "

## 2018-09-18 ENCOUNTER — OFFICE VISIT (OUTPATIENT)
Dept: DERMATOLOGY | Facility: IMAGING CENTER | Age: 73
End: 2018-09-18
Payer: MEDICARE

## 2018-09-18 DIAGNOSIS — L57.0 ACTINIC KERATOSES: ICD-10-CM

## 2018-09-18 PROCEDURE — 17000 DESTRUCT PREMALG LESION: CPT | Performed by: DERMATOLOGY

## 2018-09-18 PROCEDURE — 99213 OFFICE O/P EST LOW 20 MIN: CPT | Mod: 25 | Performed by: DERMATOLOGY

## 2018-09-18 PROCEDURE — 17003 DESTRUCT PREMALG LES 2-14: CPT | Performed by: DERMATOLOGY

## 2018-09-18 RX ORDER — IMIQUIMOD 12.5 MG/.25G
CREAM TOPICAL
Qty: 36 EACH | Refills: 0 | Status: ON HOLD | OUTPATIENT
Start: 2018-09-18 | End: 2019-01-16

## 2018-09-18 ASSESSMENT — ENCOUNTER SYMPTOMS
CHILLS: 0
FEVER: 0

## 2018-09-18 NOTE — PROGRESS NOTES
Dermatology Return Patient Visit    Chief Complaint   Patient presents with   • Actinic Keratosis       Subjective:     HPI:   Laci Baugh is a 72 y.o. male presenting for    Follow up AKs, s/p treatment with cryo last visit  Several have improved  Still has new/rough spots on the forehead    History of skin cancer: Yes, Details: BCCs on face, tx'd with Mohs, SCCs (years ago)  H/o actinic keratoses:has treated several lesions in the past with imiquimod 5% cream 3x/week for 4 weeks  History of biopsies:Yes, Details: R shoulder, benign   History of blistering/severe sunburns:Yes, Details: child  Family history of skin cancer:No  Family history of atypical moles:No        Past Medical History:   Diagnosis Date   • Hypertension    • Prostate disease    • Skin cancer     basal cell carcinoma & squamous cell carcinoma   • Stroke (HCC)        Current Outpatient Prescriptions on File Prior to Visit   Medication Sig Dispense Refill   • montelukast (SINGULAIR) 10 MG Tab Take 10 mg by mouth every day.     • Azelastine HCl 137 MCG/SPRAY Solution Spray  in nose.     • ranitidine (ZANTAC) 300 MG tablet Take 300 mg by mouth every day. 1 tab PO QD     • metformin ER (GLUCOPHAGE XR) 750 MG TABLET SR 24 HR Take 750 mg by mouth 3 times a day.     • niacin 500 MG Tab Take 1,000 mg by mouth every day. OTC     • testosterone cypionate (DEPO-TESTOSTERONE) 200 MG/ML Solution injection 60 mg by Intramuscular route every Friday.     • Levocetirizine Dihydrochloride 5 MG Tab Take 5 mg by mouth every day.     • olopatadine (PATANOL) 0.1 % ophthalmic solution Place 1-2 Drops in both eyes 2 Times a Day. 1 drops left eye in the morning  2 drops in right in the evening     • fluticasone (FLONASE) 50 MCG/ACT nasal spray Spray 2 Sprays in nose every day.     • Simethicone 180 MG Cap Take 1 Cap by mouth 2 times a day as needed.     • dutasteride (AVODART) 0.5 MG capsule Take 0.5 mg by mouth every day.     • polyethylene glycol/lytes (MIRALAX)  "Pack Take 17 g by mouth every day. Indications: Constipation     • coenzyme Q-10 30 MG capsule Take 60 mg by mouth every day.     • aspirin EC (ECOTRIN) 81 MG Tablet Delayed Response Take 162 mg by mouth 2 Times a Day. Pt takes:  243Mg in AM  81MG at 1200     • atorvastatin (LIPITOR) 40 MG Tab Take 60 mg by mouth every day.     • Cholecalciferol (VITAMIN D) 2000 UNIT Tab Take 2,000 Units by mouth every day.     • B Complex Vitamins (B COMPLEX PO) Take 1 Tab by mouth every day.       No current facility-administered medications on file prior to visit.        Allergies   Allergen Reactions   • Bloodless    • Contrast Media With Iodine [Iodine] Rash     \"Rash all over body\". CT was in 1990.       Review of Systems   Constitutional: Negative for chills and fever.   Skin: Negative for itching and rash.   All other systems reviewed and are negative.       Objective:     A focused cutaneous exam was completed including: scalp, hair, ears, face, eyelids, conjunctiva, lips, neck with the following pertinent findings listed below. Remaining above-listed examined areas within normal limits / negative for rashes or lesions.    There were no vitals taken for this visit.    Physical Exam   Constitutional: He is oriented to person, place, and time and well-developed, well-nourished, and in no distress.   HENT:   Head: Normocephalic and atraumatic.       Right Ear: External ear normal.   Left Ear: External ear normal.   Nose: Nose normal.   Eyes: Conjunctivae and lids are normal.   Neck: Normal range of motion. Neck supple.   Cardiovascular: Intact distal pulses.    Pulmonary/Chest: Effort normal.   Neurological: He is alert and oriented to person, place, and time.   Skin: Skin is warm and dry.        Psychiatric: Mood and affect normal.       DATA: none applicable to review    Assessment and Plan:     1. Actinic keratoses  - educated patient about diagnosis, management options, and expectations of " treatment  CRYOTHERAPY:  Discussed risks and benefits of cryotherapy. Patient verbally agreed. 2 applications of cryotherapy were applied to 4 lesions on the face, scalp. Patient tolerated procedure well. Aftercare instructions given.  - pt instructed to start imiquimod 5% cream 2x/week at night. Side effects (redness/irritation) discussed. Will evaluate at 4 weeks to see how he is doing, determine length of treatment course.     Followup: Return for f/u [facility], December.    Sharon Brown M.D.

## 2018-12-18 ENCOUNTER — OFFICE VISIT (OUTPATIENT)
Dept: DERMATOLOGY | Facility: IMAGING CENTER | Age: 73
End: 2018-12-18
Payer: MEDICARE

## 2018-12-18 DIAGNOSIS — D22.9 MULTIPLE NEVI: ICD-10-CM

## 2018-12-18 DIAGNOSIS — Z85.828 HISTORY OF BASAL CELL CARCINOMA: ICD-10-CM

## 2018-12-18 DIAGNOSIS — L82.1 SEBORRHEIC KERATOSES: ICD-10-CM

## 2018-12-18 DIAGNOSIS — Z85.89 HISTORY OF SQUAMOUS CELL CARCINOMA: ICD-10-CM

## 2018-12-18 DIAGNOSIS — L57.0 ACTINIC KERATOSES: ICD-10-CM

## 2018-12-18 PROCEDURE — 17000 DESTRUCT PREMALG LESION: CPT | Performed by: DERMATOLOGY

## 2018-12-18 PROCEDURE — 99213 OFFICE O/P EST LOW 20 MIN: CPT | Mod: 25 | Performed by: DERMATOLOGY

## 2018-12-18 PROCEDURE — 17003 DESTRUCT PREMALG LES 2-14: CPT | Performed by: DERMATOLOGY

## 2018-12-18 RX ORDER — APIXABAN 5 MG/1
5 TABLET, FILM COATED ORAL 2 TIMES DAILY
COMMUNITY
Start: 2018-11-29 | End: 2019-08-29

## 2018-12-18 RX ORDER — METOPROLOL SUCCINATE 100 MG/1
100 TABLET, EXTENDED RELEASE ORAL EVERY MORNING
COMMUNITY
Start: 2018-10-19 | End: 2021-04-24

## 2018-12-18 ASSESSMENT — ENCOUNTER SYMPTOMS
FEVER: 0
CHILLS: 0

## 2018-12-18 NOTE — PROGRESS NOTES
Dermatology Return Patient Visit    Chief Complaint   Patient presents with   • Follow-Up     PAGE       Subjective:     HPI:   Laci Baugh is a 72 y.o. male presenting for    PAGE, wants scalp looked at since he can't see it.  Has had a burn there recently   Also cannot see his back  Did not use imiquimod because did not see anything that needed to be treated    History of skin cancer: Yes, Details: BCCs on face, tx'd with Mohs, SCCs (years ago)  H/o actinic keratoses:has treated several lesions in the past with imiquimod 5% cream 3x/week for 4 weeks  History of biopsies:Yes, Details: R shoulder, benign   History of blistering/severe sunburns:Yes, Details: child  Family history of skin cancer:No  Family history of atypical moles:No        Past Medical History:   Diagnosis Date   • Hypertension    • Prostate disease    • Skin cancer     basal cell carcinoma & squamous cell carcinoma   • Stroke (HCC)        Current Outpatient Prescriptions on File Prior to Visit   Medication Sig Dispense Refill   • imiquimod (ALDARA) 5 % cream Twice a week at night for up to 16 weeks pending response 36 Each 0   • montelukast (SINGULAIR) 10 MG Tab Take 10 mg by mouth every day.     • Azelastine HCl 137 MCG/SPRAY Solution Spray  in nose.     • ranitidine (ZANTAC) 300 MG tablet Take 300 mg by mouth every day. 1 tab PO QD     • metformin ER (GLUCOPHAGE XR) 750 MG TABLET SR 24 HR Take 750 mg by mouth 3 times a day.     • niacin 500 MG Tab Take 1,000 mg by mouth every day. OTC     • testosterone cypionate (DEPO-TESTOSTERONE) 200 MG/ML Solution injection 60 mg by Intramuscular route every Friday.     • Levocetirizine Dihydrochloride 5 MG Tab Take 5 mg by mouth every day.     • olopatadine (PATANOL) 0.1 % ophthalmic solution Place 1-2 Drops in both eyes 2 Times a Day. 1 drops left eye in the morning  2 drops in right in the evening     • fluticasone (FLONASE) 50 MCG/ACT nasal spray Spray 2 Sprays in nose every day.     • Simethicone 180  "MG Cap Take 1 Cap by mouth 2 times a day as needed.     • dutasteride (AVODART) 0.5 MG capsule Take 0.5 mg by mouth every day.     • polyethylene glycol/lytes (MIRALAX) Pack Take 17 g by mouth every day. Indications: Constipation     • coenzyme Q-10 30 MG capsule Take 60 mg by mouth every day.     • aspirin EC (ECOTRIN) 81 MG Tablet Delayed Response Take 162 mg by mouth 2 Times a Day. Pt takes:  243Mg in AM  81MG at 1200     • atorvastatin (LIPITOR) 40 MG Tab Take 60 mg by mouth every day.     • Cholecalciferol (VITAMIN D) 2000 UNIT Tab Take 2,000 Units by mouth every day.     • B Complex Vitamins (B COMPLEX PO) Take 1 Tab by mouth every day.       No current facility-administered medications on file prior to visit.        Allergies   Allergen Reactions   • Bloodless    • Contrast Media With Iodine [Iodine] Rash     \"Rash all over body\". CT was in 1990.       Review of Systems   Constitutional: Negative for chills and fever.   Skin: Negative for itching and rash.   All other systems reviewed and are negative.       Objective:     A focused cutaneous exam was completed including: scalp, hair, ears, face, eyelids, conjunctiva, lips, neck with the following pertinent findings listed below. Remaining above-listed examined areas within normal limits / negative for rashes or lesions.    There were no vitals taken for this visit.    Physical Exam   Constitutional: He is oriented to person, place, and time and well-developed, well-nourished, and in no distress.   HENT:   Head: Normocephalic and atraumatic.       Right Ear: External ear normal.   Left Ear: External ear normal.   Nose: Nose normal.   Mouth/Throat: Oropharynx is clear and moist.   Eyes: Conjunctivae and lids are normal.   Neck: Normal range of motion. Neck supple.   Cardiovascular: Intact distal pulses.    Pulmonary/Chest: Effort normal.   Neurological: He is alert and oriented to person, place, and time.   Skin: Skin is warm and dry.        Psychiatric: Mood " and affect normal.       DATA: none applicable to review    Assessment and Plan:     1. Actinic keratoses  CRYOTHERAPY:  Risks (including, but not limited to: hypo or hyperpigmentation, redness, blister, blood blister, recurrence, need for further treatment, infection, scar) and benefits of cryotherapy discussed. Patient verbally agreed to proceed with treatment. 2 cryotherapy freeze thaw cycles of 10 seconds were applied to 10 lesions on the scalp, nose, chest with cryac. Patient tolerated procedure well. Aftercare instructions given.  - pt instructed to start imiquimod 5% cream 3x/week at night x 1 month (scalp) he is going to treat in separate areas. Side effects (redness/irritation) discussed.    2. Multiple nevi  - Benign-appearing nature of lesions discussed. Advised to return to clinic for any new or concerning changes.  - ABCDE's of melanoma discussed    3. Seborrheic keratoses  - Benign-appearing nature of lesions discussed. Advised to return to clinic for any new or concerning changes.    4. History of basal cell carcinoma  Skin cancer education  - discussed importance of sun protective clothing, eyewear  - discussed importance of daily use of broad spectrum sunscreen with SPF 30 or greater, as well as need for reapplication ~every 2 hours when exposed to UVR  - discussed importance of regular self-exams, ideally once per month, every 6 months exams in clinic  - ABCDE's of melanoma discussed  - patient to bring any new or concerning lesions to my attention    5. History of squamous cell carcinoma  - skin cancer education/counseling as noted above      Followup: Return in about 6 months (around 6/18/2019).    Sharon Brown M.D.

## 2018-12-27 ENCOUNTER — APPOINTMENT (OUTPATIENT)
Dept: ADMISSIONS | Facility: MEDICAL CENTER | Age: 73
End: 2018-12-27
Attending: OPHTHALMOLOGY
Payer: COMMERCIAL

## 2018-12-27 DIAGNOSIS — Z01.812 PRE-OPERATIVE LABORATORY EXAMINATION: ICD-10-CM

## 2018-12-27 DIAGNOSIS — Z01.810 PRE-OPERATIVE CARDIOVASCULAR EXAMINATION: ICD-10-CM

## 2018-12-27 NOTE — OR NURSING
Message left with Dr. Goldsetin's surgery scheduler that patient has elected to be BLOODLESS.  Patient reports that he received aspirin and Eliquis instructions from Dr. Goldstein.

## 2019-01-07 ENCOUNTER — TELEPHONE (OUTPATIENT)
Dept: DERMATOLOGY | Facility: IMAGING CENTER | Age: 74
End: 2019-01-07

## 2019-01-07 RX ORDER — UREA 40 %
1 CREAM (GRAM) TOPICAL 2 TIMES DAILY
Qty: 1 TUBE | Refills: 4 | Status: ON HOLD | OUTPATIENT
Start: 2019-01-07 | End: 2019-01-16

## 2019-01-07 NOTE — TELEPHONE ENCOUNTER
Patient requesting a Rx to treat his callus on the side of his foot. States you had discussed with him at last visit.

## 2019-01-16 ENCOUNTER — HOSPITAL ENCOUNTER (OUTPATIENT)
Facility: MEDICAL CENTER | Age: 74
End: 2019-01-16
Attending: OPHTHALMOLOGY | Admitting: OPHTHALMOLOGY
Payer: COMMERCIAL

## 2019-01-16 VITALS
OXYGEN SATURATION: 94 % | HEIGHT: 69 IN | HEART RATE: 61 BPM | BODY MASS INDEX: 22.11 KG/M2 | WEIGHT: 149.25 LBS | RESPIRATION RATE: 16 BRPM

## 2019-01-16 LAB — GLUCOSE BLD-MCNC: 92 MG/DL (ref 65–99)

## 2019-01-16 PROCEDURE — 700101 HCHG RX REV CODE 250

## 2019-01-16 PROCEDURE — 160025 RECOVERY II MINUTES (STATS): Performed by: OPHTHALMOLOGY

## 2019-01-16 PROCEDURE — 160035 HCHG PACU - 1ST 60 MINS PHASE I: Performed by: OPHTHALMOLOGY

## 2019-01-16 PROCEDURE — 160029 HCHG SURGERY MINUTES - 1ST 30 MINS LEVEL 4: Performed by: OPHTHALMOLOGY

## 2019-01-16 PROCEDURE — 160041 HCHG SURGERY MINUTES - EA ADDL 1 MIN LEVEL 4: Performed by: OPHTHALMOLOGY

## 2019-01-16 PROCEDURE — 160036 HCHG PACU - EA ADDL 30 MINS PHASE I: Performed by: OPHTHALMOLOGY

## 2019-01-16 PROCEDURE — 160002 HCHG RECOVERY MINUTES (STAT): Performed by: OPHTHALMOLOGY

## 2019-01-16 PROCEDURE — 160009 HCHG ANES TIME/MIN: Performed by: OPHTHALMOLOGY

## 2019-01-16 PROCEDURE — 160048 HCHG OR STATISTICAL LEVEL 1-5: Performed by: OPHTHALMOLOGY

## 2019-01-16 PROCEDURE — 700102 HCHG RX REV CODE 250 W/ 637 OVERRIDE(OP): Performed by: ANESTHESIOLOGY

## 2019-01-16 PROCEDURE — 700111 HCHG RX REV CODE 636 W/ 250 OVERRIDE (IP)

## 2019-01-16 PROCEDURE — 160046 HCHG PACU - 1ST 60 MINS PHASE II: Performed by: OPHTHALMOLOGY

## 2019-01-16 PROCEDURE — A9270 NON-COVERED ITEM OR SERVICE: HCPCS | Performed by: ANESTHESIOLOGY

## 2019-01-16 PROCEDURE — 501836 HCHG SUTURE EYE: Performed by: OPHTHALMOLOGY

## 2019-01-16 PROCEDURE — 82962 GLUCOSE BLOOD TEST: CPT

## 2019-01-16 RX ORDER — OXYCODONE HYDROCHLORIDE AND ACETAMINOPHEN 5; 325 MG/1; MG/1
2 TABLET ORAL
Status: DISCONTINUED | OUTPATIENT
Start: 2019-01-16 | End: 2019-01-16 | Stop reason: HOSPADM

## 2019-01-16 RX ORDER — ONDANSETRON 2 MG/ML
4 INJECTION INTRAMUSCULAR; INTRAVENOUS
Status: DISCONTINUED | OUTPATIENT
Start: 2019-01-16 | End: 2019-01-16 | Stop reason: HOSPADM

## 2019-01-16 RX ORDER — MIDAZOLAM HYDROCHLORIDE 1 MG/ML
1 INJECTION INTRAMUSCULAR; INTRAVENOUS
Status: DISCONTINUED | OUTPATIENT
Start: 2019-01-16 | End: 2019-01-16 | Stop reason: HOSPADM

## 2019-01-16 RX ORDER — SODIUM CHLORIDE, SODIUM LACTATE, POTASSIUM CHLORIDE, CALCIUM CHLORIDE 600; 310; 30; 20 MG/100ML; MG/100ML; MG/100ML; MG/100ML
INJECTION, SOLUTION INTRAVENOUS ONCE
Status: DISCONTINUED | OUTPATIENT
Start: 2019-01-16 | End: 2019-01-16 | Stop reason: HOSPADM

## 2019-01-16 RX ORDER — ACETAMINOPHEN 500 MG
1000 TABLET ORAL ONCE
Status: COMPLETED | OUTPATIENT
Start: 2019-01-16 | End: 2019-01-16

## 2019-01-16 RX ORDER — SODIUM CHLORIDE, SODIUM LACTATE, POTASSIUM CHLORIDE, CALCIUM CHLORIDE 600; 310; 30; 20 MG/100ML; MG/100ML; MG/100ML; MG/100ML
INJECTION, SOLUTION INTRAVENOUS CONTINUOUS
Status: DISCONTINUED | OUTPATIENT
Start: 2019-01-16 | End: 2019-01-16 | Stop reason: HOSPADM

## 2019-01-16 RX ORDER — LIDOCAINE HYDROCHLORIDE AND EPINEPHRINE BITARTRATE 20; .01 MG/ML; MG/ML
INJECTION, SOLUTION SUBCUTANEOUS
Status: DISCONTINUED | OUTPATIENT
Start: 2019-01-16 | End: 2019-01-16 | Stop reason: HOSPADM

## 2019-01-16 RX ORDER — DIPHENHYDRAMINE HYDROCHLORIDE 50 MG/ML
12.5 INJECTION INTRAMUSCULAR; INTRAVENOUS
Status: DISCONTINUED | OUTPATIENT
Start: 2019-01-16 | End: 2019-01-16 | Stop reason: HOSPADM

## 2019-01-16 RX ORDER — ERYTHROMYCIN 5 MG/G
OINTMENT OPHTHALMIC
Status: DISCONTINUED
Start: 2019-01-16 | End: 2019-01-16 | Stop reason: HOSPADM

## 2019-01-16 RX ORDER — ERYTHROMYCIN 5 MG/G
OINTMENT OPHTHALMIC
Status: DISCONTINUED | OUTPATIENT
Start: 2019-01-16 | End: 2019-01-16 | Stop reason: HOSPADM

## 2019-01-16 RX ORDER — LIDOCAINE HCL/EPINEPHRINE/PF 2%-1:200K
VIAL (ML) INJECTION
Status: DISCONTINUED
Start: 2019-01-16 | End: 2019-01-16 | Stop reason: HOSPADM

## 2019-01-16 RX ORDER — HALOPERIDOL 5 MG/ML
1 INJECTION INTRAMUSCULAR
Status: DISCONTINUED | OUTPATIENT
Start: 2019-01-16 | End: 2019-01-16 | Stop reason: HOSPADM

## 2019-01-16 RX ORDER — CELECOXIB 200 MG/1
200 CAPSULE ORAL ONCE
Status: COMPLETED | OUTPATIENT
Start: 2019-01-16 | End: 2019-01-16

## 2019-01-16 RX ORDER — OXYCODONE HYDROCHLORIDE AND ACETAMINOPHEN 5; 325 MG/1; MG/1
1 TABLET ORAL
Status: DISCONTINUED | OUTPATIENT
Start: 2019-01-16 | End: 2019-01-16 | Stop reason: HOSPADM

## 2019-01-16 RX ADMIN — ACETAMINOPHEN 1000 MG: 500 TABLET, FILM COATED ORAL at 09:37

## 2019-01-16 RX ADMIN — CELECOXIB 200 MG: 200 CAPSULE ORAL at 09:37

## 2019-01-16 ASSESSMENT — PAIN SCALES - GENERAL
PAINLEVEL_OUTOF10: 0

## 2019-01-16 NOTE — OP REPORT
DATE OF SERVICE:  01/16/2019    ATTENDING SURGEON:  Ethan Goldstein MD    ASSISTANT SURGEON:  None.    ANESTHESIOLOGIST:  Nichol Laguna MD    ANESTHESIA:  General with local supplementation.    PREOPERATIVE DIAGNOSES:  1.  Ectropion, bilateral lower eyelids, cicatricial and senile.  2.  Eyelid retraction, bilateral lower eyelids.  3.  Lagophthalmos, both eyes.  4.  Midface descent, bilateral.    POSTOPERATIVE DIAGNOSES:  1.  Ectropion, bilateral lower eyelids, cicatricial and senile.  2.  Eyelid retraction, bilateral lower eyelids.  3.  Lagophthalmos, both eyes.  4.  Midface descent, bilateral.    PROCEDURES PERFORMED:  1.  Ectropion repair with lateral tarsal strip, bilateral lower eyelids.  2.  Midface advancement flaps from cheek to eyelid, bilateral.    SPECIMENS:  None.    IMPLANTS:  None.    COMPLICATIONS:  None.    ESTIMATED BLOOD LOSS:  Less than 5 mL.    INDICATIONS FOR PROCEDURE:  This is a 73-year-old male with a history of   significant eyelid ectropion and retraction.  This was due to involutional   changes as well as cicatricial changes for what appears to be likely prior   blepharoplasty or lower lid surgery.  There is a relative shortage of anterior   lamella in the lower eyelids causing a downward vector pulling the eyelid   down and out.  This was exacerbated by the patient's midface descent with age.    Options for correcting the ectropion were discussed.  Ectropion repair with   lateral tarsal strip alone would not be adequate given the amount of tension   on the lower eyelid.  Alternatives included midface advancement flap to   recruit skin into the eyelid from the cheek area and to reduce the tension on   the eyelid versus full thickness skin graft placement.  After discussing it,   the patient wished to undergo the above-listed procedures.  Risks, benefits,   and alternatives were explained to the patient in detail including no   guarantee for a complete successfully without possible need for  additional   surgery and informed consent was signed.    PROCEDURE IN DETAIL:  The patient was brought to the operating room and laid   supine on the operating room table.  After induction of general anesthesia,   the right lateral canthal area was infiltrated with 2% lidocaine with   epinephrine all the way down to the lateral orbital rim periosteum.    Additional infiltration was placed over the inferolateral orbital rim and   malar eminence laterally down to the bone.  The full face was prepped and   draped in the usual sterile fashion.  A lateral canthotomy was performed using   Villasenor tenotomy scissors to begin the ectropion repair.  Sharp dissection   was carried down to the lateral orbital rim periosteum.  The periosteum was   exposed using a freer elevator.  The lateral tarsal strip was prepared by   splitting the anterior and posterior lamella of the temporal lower eyelid for   several millimeters and the redundant lash-bearing skin mucocutaneous junction   were excised.  Before proceeding with plicating the lateral tarsal strip, the   mid face flap was then performed.      An incision was then made lateral to the lateral canthal incision down to the orbital rim periosteum laterally.  Please note that this was a distinct incision and plane of dissection that is not normally performed in ectropion repair alone.  Sharp dissection was then used to develop a plane in the preperiosteal plane using   Villasenor tenotomy scissors.  Sharp and blunt dissection was carried along the   inferior lateral orbital rim and on to the zygoma nicely freeing up the soft   tissues of the cheek and midface.  Once this was adequately mobilized, so that   suspension advancement sutures could be placed later in the case, then   attention was turned back to the ectropion repair where to complete the   plication of the lateral tarsal strip to the lateral canthal tissues.    The lateral tarsal strip was captured on a double arm 5-0  Mersilene suture in   a horizontal mattress fashion.  Each arm of the suture was passed through the   lateral orbital rim periosteum and then tied to itself tautly.  This nicely   and eliminated the ectropion and eliminated the waxing and lower eyelids.    Attention was then turned to the advancement of the midface flap.  The   suborbicularis oculi fibrofatty tissue was then grasped with forceps and then   two 5-0 Mersilene sutures were placed through that tissue and then suspended   to the lateral orbital rim periosteum outside of the orbital rim.  These were   both preplaced and then tied in succession nicely elevating and advancing the   midface superolaterally to support the lower eyelid.  This suture also closed   the incision made for the flap.  Attention was then turned to closing the   incisions in the lateral canthus for the ectropion repair.  The lateral   canthal angle was reapproximated with buried 7-0 Vicryl suture through the   gray line of the upper and lower eyelids.  The orbicularis layer was   reapproximated with several interrupted buried 5-0 Vicryl sutures.  The skin   was reapproximated with interrupted 7-0 Vicryl sutures.  The identical   procedure was then performed on the contralateral left side.  There were no   complications.  The bilateral malar eminences were pressure patched with a   small localized pressure patch was secured consisting of a folded 4x4 and   secured with Mastisol and paper tape.  There were no complications.  Patient   was extubated and brought to the PACU in stable condition.       ____________________________________     MD Lashell Agarwal / SABINA    DD:  01/16/2019 12:54:44  DT:  01/16/2019 13:30:45    D#:  [id number]  Job#:  [id number]

## 2019-01-16 NOTE — DISCHARGE INSTRUCTIONS
ACTIVITY: Rest and take it easy for the first 24 hours.  A responsible adult is recommended to remain with you during that time.  It is normal to feel sleepy.  We encourage you to not do anything that requires balance, judgment or coordination.    MILD FLU-LIKE SYMPTOMS ARE NORMAL. YOU MAY EXPERIENCE GENERALIZED MUSCLE ACHES, THROAT IRRITATION, HEADACHE AND/OR SOME NAUSEA.    FOR 24 HOURS DO NOT:  Drive, operate machinery or run household appliances.  Drink beer or alcoholic beverages.   Make important decisions or sign legal documents                                                  SPECIAL INSTRUCTIONS: FOLLOW INSTRUCTIONS FROM DR. HARDY:   - KEEP HEAD OF BED ELEVATED FOR 48 HRS.  -USE ICE PACKS 20 MIN ON, 20 MIN OFF FOR NEXT 48 HRS, THEN BEGIN HEAT PACKS 4 TIMES DAILY UNTIL FOLLOW UP APPOINTMENT.  - PLACE EYE SHIELD OVER EYE WHEN SLEEPING.  -KEEP WOUND CLEAN AND DRY UNTIL FOLLOW UP APPOINTMENT, NO SUBMERGING IN WATER.  - NO BENDING, STANDING, STRAINING, STOOPING OR LIFTING.  - APPLY ERYTHROMYCIN 0.5% OPHTHALMIC OINTMENT 3 TIMES DAILY TO INCISIONS.  - KEEP CHEEK DRESSINGS IN PLACE FOR 2 DAYS, THEN MAY REMOVE AT HOME IN UPWARD DIRECTIONS.    DIET: To avoid nausea, slowly advance diet as tolerated, avoiding spicy or greasy foods for the first day.  Add more substantial food to your diet according to your physician's instructions.  Babies can be fed formula or breast milk as soon as they are hungry.  INCREASE FLUIDS AND FIBER TO AVOID CONSTIPATION.    SURGICAL DRESSING/BATHING: *SEE ABOVE INSTRUCTIONS.**    FOLLOW-UP APPOINTMENT:  A follow-up appointment should be arranged with your doctor in *CALL TO SCHEDULE FOLLOW UP APPOINTMENT.**    You should CALL YOUR PHYSICIAN if you develop:  Fever greater than 101 degrees F.  Pain not relieved by medication, or persistent nausea or vomiting.  Excessive bleeding (blood soaking through dressing) or unexpected drainage from the wound.  Extreme redness or swelling around the  incision site, drainage of pus or foul smelling drainage.  Inability to urinate or empty your bladder within 8 hours.  Problems with breathing or chest pain.    You should call 911 if you develop problems with breathing or chest pain.  If you are unable to contact your doctor or surgical center, you should go to the nearest emergency room or urgent care center.  Physician's telephone #: *[phone]**    If any questions arise, call your doctor.  If your doctor is not available, please feel free to call the Surgical Center at [phone].  The Center is open Monday through Friday from 7AM to 7PM.  You can also call the HEALTH HOTLINE open 24 hours/day, 7 days/week and speak to a nurse at [phone], or toll free at [phone].    A registered nurse may call you a few days after your surgery to see how you are doing after your procedure.    MEDICATIONS: Resume taking daily medication.  Take prescribed pain medication with food.  If no medication is prescribed, you may take non-aspirin pain medication if needed.  PAIN MEDICATION CAN BE VERY CONSTIPATING.  Take a stool softener or laxative such as senokot, pericolace, or milk of magnesia if needed.    Prescription given for *NONE**.  Last pain medication given at *NONE GIVEN**.    If your physician has prescribed pain medication that includes Acetaminophen (Tylenol), do not take additional Acetaminophen (Tylenol) while taking the prescribed medication.    Depression / Suicide Risk    As you are discharged from this [facility] facility, it is important to learn how to keep safe from harming yourself.    Recognize the warning signs:  · Abrupt changes in personality, positive or negative- including increase in energy   · Giving away possessions  · Change in eating patterns- significant weight changes-  positive or negative  · Change in sleeping patterns- unable to sleep or sleeping all the time   · Unwillingness or inability to  communicate  · Depression  · Unusual sadness, discouragement and loneliness  · Talk of wanting to die  · Neglect of personal appearance   · Rebelliousness- reckless behavior  · Withdrawal from people/activities they love  · Confusion- inability to concentrate     If you or a loved one observes any of these behaviors or has concerns about self-harm, here's what you can do:  · Talk about it- your feelings and reasons for harming yourself  · Remove any means that you might use to hurt yourself (examples: pills, rope, extension cords, firearm)  · Get professional help from the community (Mental Health, Substance Abuse, psychological counseling)  · Do not be alone:Call your Safe Contact- someone whom you trust who will be there for you.  · Call your local CRISIS HOTLINE 283-8655 or 570-429-5382  · Call your local Children's Mobile Crisis Response Team [place] (525) 166-4378 or www.[place]  · Call the toll free National Suicide Prevention Hotlines   · National Suicide Prevention Lifeline 597-072-XXEL (4541)  · National Hope Line Network 800-SUICIDE (061-1946)

## 2019-01-16 NOTE — PROGRESS NOTES
1247-Pt in PACU from OR. Sleeping, lungs clear, respirations even. Dr. Laguna aware of low BP.     1357-Pt waking up slowly, putting in hearing aides.     1409-Pt's wife helping him get dressed. Pt states he feels ready to go home. Pt has wedding ring back on from belongings.     1423-Pt discharged home with wife via wheelchair.

## 2019-06-04 ENCOUNTER — OFFICE VISIT (OUTPATIENT)
Dept: DERMATOLOGY | Facility: IMAGING CENTER | Age: 74
End: 2019-06-04
Payer: COMMERCIAL

## 2019-06-04 DIAGNOSIS — Z85.89 HISTORY OF SQUAMOUS CELL CARCINOMA: ICD-10-CM

## 2019-06-04 DIAGNOSIS — L57.0 ACTINIC KERATOSES: ICD-10-CM

## 2019-06-04 DIAGNOSIS — Z85.828 HISTORY OF BASAL CELL CARCINOMA: ICD-10-CM

## 2019-06-04 PROCEDURE — 99211 OFF/OP EST MAY X REQ PHY/QHP: CPT | Performed by: DERMATOLOGY

## 2019-06-04 ASSESSMENT — ENCOUNTER SYMPTOMS
FEVER: 0
CHILLS: 0

## 2019-06-04 NOTE — PROGRESS NOTES
"Dermatology Return Patient Visit    Chief Complaint   Patient presents with   • Follow-Up       Subjective:     HPI:   Laci Baugh is a 72 y.o. male presenting for    Follow up actinic keratoses  Has been using imiquimod 5% cream 2x/week at night  Started medication late march, applying it on face and forehead  Has concerns about the non-healing sores on nose, cheeks and forehead .   Unsure if he needs a biospy    History of skin cancer: Yes, Details: BCCs on face, tx'd with Mohs, SCCs (years ago)  H/o actinic keratoses:has treated several lesions in the past with imiquimod 5% cream 3x/week for 4 weeks  History of biopsies:Yes, Details: R shoulder, benign   History of blistering/severe sunburns:Yes, Details: child  Family history of skin cancer:No  Family history of atypical moles:No        Past Medical History:   Diagnosis Date   • Bowel habit changes     constipation   • Cataract     removed bilat   • Cold 12/2018   • Diabetes (HCC) 12/27/2018    \"PRE\", on oral meds   • Environmental and seasonal allergies    • Heart burn    • Heart valve disease        • Hiatus hernia syndrome    • High cholesterol    • History of BPH    • Indigestion    • Skin cancer     basal cell carcinoma & squamous cell carcinoma   • Snoring     no sleep study   • TIA (transient ischemic attack)     reports 1980/2005/2013       Current Outpatient Prescriptions on File Prior to Visit   Medication Sig Dispense Refill   • Cholecalciferol (VITAMIN D3) 5000 units Cap Take 1 Cap by mouth every day.     • metoprolol SR (TOPROL XL) 100 MG TABLET SR 24 HR Take 100 mg by mouth every morning.     • ELIQUIS 5 MG Tab Take 5 mg by mouth 2 Times a Day.     • ranitidine (ZANTAC) 300 MG tablet Take 300 mg by mouth every day.     • metformin ER (GLUCOPHAGE XR) 750 MG TABLET SR 24 HR Take 750 mg by mouth 2 times a day.     • niacin 500 MG Tab Take 1,000 mg by mouth every day. OTC     • testosterone cypionate (DEPO-TESTOSTERONE) 200 MG/ML Solution " injection 60 mg by Intramuscular route every Friday.     • Levocetirizine Dihydrochloride 5 MG Tab Take 5 mg by mouth every day.     • olopatadine (PATANOL) 0.1 % ophthalmic solution Place 1-2 Drops in both eyes 2 Times a Day. 1 drops left eye in the morning  2 drops in right in the evening     • fluticasone (FLONASE) 50 MCG/ACT nasal spray Spray 2 Sprays in nose every day.     • dutasteride (AVODART) 0.5 MG capsule Take 0.5 mg by mouth every morning.     • polyethylene glycol/lytes (MIRALAX) Pack Take 17 g by mouth every day. Indications: Constipation     • coenzyme Q-10 30 MG capsule Take 60 mg by mouth every day.     • aspirin EC (ECOTRIN) 81 MG Tablet Delayed Response Take 81 mg by mouth every morning. Pt takes:  243Mg in AM  81MG at 1200      • atorvastatin (LIPITOR) 40 MG Tab Take 40 mg by mouth every evening.     • B Complex Vitamins (B COMPLEX PO) Take 1 Tab by mouth every day.       No current facility-administered medications on file prior to visit.        Allergies   Allergen Reactions   • Other Food Unspecified     Reports multiple food related allergies.     Review of Systems   Constitutional: Negative for chills and fever.   Skin: Negative for itching and rash.   All other systems reviewed and are negative.       Objective:     A focused cutaneous exam was completed including: scalp, hair, ears, face, eyelids, conjunctiva, lips, neck with the following pertinent findings listed below. Remaining above-listed examined areas within normal limits / negative for rashes or lesions.    There were no vitals taken for this visit.    Physical Exam   Constitutional: He is oriented to person, place, and time and well-developed, well-nourished, and in no distress.   HENT:   Head: Normocephalic and atraumatic.       Eyes: Conjunctivae and lids are normal.   Neck: Normal range of motion.   Pulmonary/Chest: Effort normal.   Neurological: He is alert and oriented to person, place, and time.   Skin: Skin is warm and  dry.        Psychiatric: Mood and affect normal.       DATA: none applicable to review    Assessment and Plan:     1. Actinic keratoses - exuberant response to topical therapy  - d/c imiquimod, reassured  - continue healing ointment, sun protection  - will re-evaluate in 1 month (at [facility])    2. History of squamous cell carcinoma  Skin cancer education  - discussed importance of sun protective clothing, eyewear  - discussed importance of daily use of broad spectrum sunscreen with SPF 30 or greater, as well as need for reapplication ~every 2 hours when exposed to UVR  - discussed importance of regular self-exams, ideally once per month, every 6 months exams in clinic  - ABCDE's of melanoma discussed  - patient to bring any new or concerning lesions to my attention    3. History of basal cell carcinoma  - skin cancer education/counseling as noted above      Followup: Return in about 4 weeks (around 7/2/2019).    Sharon Brown M.D.

## 2019-07-18 ENCOUNTER — OFFICE VISIT (OUTPATIENT)
Dept: DERMATOLOGY | Facility: IMAGING CENTER | Age: 74
End: 2019-07-18
Payer: COMMERCIAL

## 2019-07-18 DIAGNOSIS — L57.0 ACTINIC KERATOSES: ICD-10-CM

## 2019-07-18 DIAGNOSIS — Z85.828 HISTORY OF BASAL CELL CARCINOMA: ICD-10-CM

## 2019-07-18 DIAGNOSIS — D18.01 CHERRY ANGIOMA: ICD-10-CM

## 2019-07-18 DIAGNOSIS — L82.1 SEBORRHEIC KERATOSES: ICD-10-CM

## 2019-07-18 DIAGNOSIS — D22.9 MULTIPLE NEVI: ICD-10-CM

## 2019-07-18 DIAGNOSIS — Z85.89 HISTORY OF SQUAMOUS CELL CARCINOMA: ICD-10-CM

## 2019-07-18 PROCEDURE — 99213 OFFICE O/P EST LOW 20 MIN: CPT | Mod: 25 | Performed by: DERMATOLOGY

## 2019-07-18 PROCEDURE — 17000 DESTRUCT PREMALG LESION: CPT | Performed by: DERMATOLOGY

## 2019-07-18 PROCEDURE — 17003 DESTRUCT PREMALG LES 2-14: CPT | Performed by: DERMATOLOGY

## 2019-07-18 ASSESSMENT — ENCOUNTER SYMPTOMS
CHILLS: 0
FEVER: 0

## 2019-07-18 NOTE — PROGRESS NOTES
"Dermatology Return Patient Visit    Chief Complaint   Patient presents with   • Follow-Up     PAGE        Subjective:     HPI:   Laci Baugh is a 72 y.o. male presenting for    Follow up 6 months PAGE     Completed treatment imiquimod 5% cream 2x/week at night 1 month ago - face  Still has rough spots on the top of the head  Come and go  Sometimes itch    History of skin cancer: Yes, Details: BCCs on face, tx'd with Mohs, SCCs (years ago)  H/o actinic keratoses:has treated several lesions in the past with imiquimod 5% cream 3x/week for 4 weeks  History of biopsies:Yes, Details: R shoulder, benign   History of blistering/severe sunburns:Yes, Details: child  Family history of skin cancer:No  Family history of atypical moles:No        Past Medical History:   Diagnosis Date   • Bowel habit changes     constipation   • Cataract     removed bilat   • Cold 12/2018   • Diabetes (HCC) 12/27/2018    \"PRE\", on oral meds   • Environmental and seasonal allergies    • Heart burn    • Heart valve disease        • Hiatus hernia syndrome    • High cholesterol    • History of BPH    • Indigestion    • Skin cancer     basal cell carcinoma & squamous cell carcinoma   • Snoring     no sleep study   • TIA (transient ischemic attack)     reports 1980/2005/2013       Current Outpatient Prescriptions on File Prior to Visit   Medication Sig Dispense Refill   • Cholecalciferol (VITAMIN D3) 5000 units Cap Take 1 Cap by mouth every day.     • metoprolol SR (TOPROL XL) 100 MG TABLET SR 24 HR Take 100 mg by mouth every morning.     • ELIQUIS 5 MG Tab Take 5 mg by mouth 2 Times a Day.     • ranitidine (ZANTAC) 300 MG tablet Take 300 mg by mouth every day.     • metformin ER (GLUCOPHAGE XR) 750 MG TABLET SR 24 HR Take 750 mg by mouth 2 times a day.     • niacin 500 MG Tab Take 1,000 mg by mouth every day. OTC     • testosterone cypionate (DEPO-TESTOSTERONE) 200 MG/ML Solution injection 60 mg by Intramuscular route every Friday.     • " Levocetirizine Dihydrochloride 5 MG Tab Take 5 mg by mouth every day.     • olopatadine (PATANOL) 0.1 % ophthalmic solution Place 1-2 Drops in both eyes 2 Times a Day. 1 drops left eye in the morning  2 drops in right in the evening     • fluticasone (FLONASE) 50 MCG/ACT nasal spray Spray 2 Sprays in nose every day.     • dutasteride (AVODART) 0.5 MG capsule Take 0.5 mg by mouth every morning.     • polyethylene glycol/lytes (MIRALAX) Pack Take 17 g by mouth every day. Indications: Constipation     • coenzyme Q-10 30 MG capsule Take 60 mg by mouth every day.     • aspirin EC (ECOTRIN) 81 MG Tablet Delayed Response Take 81 mg by mouth every morning. Pt takes:  243Mg in AM  81MG at 1200      • atorvastatin (LIPITOR) 40 MG Tab Take 40 mg by mouth every evening.     • B Complex Vitamins (B COMPLEX PO) Take 1 Tab by mouth every day.       No current facility-administered medications on file prior to visit.        Allergies   Allergen Reactions   • Other Food Unspecified     Reports multiple food related allergies.     Review of Systems   Constitutional: Negative for chills and fever.   Skin: Negative for itching and rash.   All other systems reviewed and are negative.       Objective:     A focused cutaneous exam was completed including: scalp, hair, ears, face, eyelids, conjunctiva, lips, neck with the following pertinent findings listed below. Remaining above-listed examined areas within normal limits / negative for rashes or lesions.    There were no vitals taken for this visit.    Physical Exam   Constitutional: He is oriented to person, place, and time and well-developed, well-nourished, and in no distress.   HENT:   Head: Normocephalic and atraumatic.       Right Ear: External ear normal.   Left Ear: External ear normal.   Nose: Nose normal.   Mouth/Throat: Oropharynx is clear and moist.   Eyes: Conjunctivae and lids are normal.   Neck: Normal range of motion. Neck supple.   Cardiovascular: Intact distal pulses.     Pulmonary/Chest: Effort normal.   Neurological: He is alert and oriented to person, place, and time.   Skin: Skin is warm and dry.        Psychiatric: Mood and affect normal.       DATA: none applicable to review    Assessment and Plan:     1. Actinic keratoses  CRYOTHERAPY:  Risks (including, but not limited to: hypo or hyperpigmentation, redness, blister, blood blister, recurrence, need for further treatment, infection, scar) and benefits of cryotherapy discussed. Patient verbally agreed to proceed with treatment. 2 cryotherapy freeze thaw cycles of 10 seconds were applied to 8 lesions on the face, scalp with cryac. Patient tolerated procedure well. Aftercare instructions given.  - pt instructed to start imiquimod 5% cream 2x/week at night to scalp x 1-2 months (pending response). Side effects (redness/irritation) discussed. Discussed this treatment is off label for this condition    2. History of squamous cell carcinoma  Skin cancer education  - discussed importance of sun protective clothing, eyewear  - discussed importance of daily use of broad spectrum sunscreen with SPF 30 or greater, as well as need for reapplication ~every 2 hours when exposed to UVR  - discussed importance of regular self-exams, ideally once per month, every 6 months exams in clinic  - ABCDE's of melanoma discussed  - patient to bring any new or concerning lesions to my attention    3. History of basal cell carcinoma  - skin cancer education/counseling as noted above    4. Multiple nevi  - Benign-appearing nature of lesions discussed. Advised to return to clinic for any new or concerning changes.    5. Cherry angiomas  - Benign-appearing nature of lesions discussed. Advised to return to clinic for any new or concerning changes.    6. Seborrheic keratoses  - Benign-appearing nature of lesions discussed. Advised to return to clinic for any new or concerning changes.    Followup: Return in about 6 months (around 1/18/2020).    Sharon CERVANTES  RODRIGO Brown.

## 2019-08-24 ENCOUNTER — HOSPITAL ENCOUNTER (INPATIENT)
Facility: MEDICAL CENTER | Age: 74
LOS: 1 days | DRG: 863 | End: 2019-08-25
Attending: EMERGENCY MEDICINE | Admitting: INTERNAL MEDICINE
Payer: COMMERCIAL

## 2019-08-24 DIAGNOSIS — N39.0 URINARY TRACT INFECTION WITH HEMATURIA, SITE UNSPECIFIED: ICD-10-CM

## 2019-08-24 DIAGNOSIS — R53.1 WEAKNESS: ICD-10-CM

## 2019-08-24 DIAGNOSIS — R31.9 URINARY TRACT INFECTION WITH HEMATURIA, SITE UNSPECIFIED: ICD-10-CM

## 2019-08-24 LAB
ALBUMIN SERPL BCP-MCNC: 4.2 G/DL (ref 3.2–4.9)
ALBUMIN/GLOB SERPL: 1.1 G/DL
ALP SERPL-CCNC: 115 U/L (ref 30–99)
ALT SERPL-CCNC: 25 U/L (ref 2–50)
ANION GAP SERPL CALC-SCNC: 14 MMOL/L (ref 0–11.9)
APPEARANCE UR: ABNORMAL
AST SERPL-CCNC: 31 U/L (ref 12–45)
BACTERIA #/AREA URNS HPF: ABNORMAL /HPF
BASOPHILS # BLD AUTO: 0.3 % (ref 0–1.8)
BASOPHILS # BLD: 0.03 K/UL (ref 0–0.12)
BILIRUB SERPL-MCNC: 1.7 MG/DL (ref 0.1–1.5)
BILIRUB UR QL STRIP.AUTO: NEGATIVE
BUN SERPL-MCNC: 22 MG/DL (ref 8–22)
CALCIUM SERPL-MCNC: 9.7 MG/DL (ref 8.4–10.2)
CHLORIDE SERPL-SCNC: 96 MMOL/L (ref 96–112)
CO2 SERPL-SCNC: 23 MMOL/L (ref 20–33)
COLOR UR: YELLOW
CREAT SERPL-MCNC: 0.71 MG/DL (ref 0.5–1.4)
EOSINOPHIL # BLD AUTO: 0 K/UL (ref 0–0.51)
EOSINOPHIL NFR BLD: 0 % (ref 0–6.9)
ERYTHROCYTE [DISTWIDTH] IN BLOOD BY AUTOMATED COUNT: 44.8 FL (ref 35.9–50)
GLOBULIN SER CALC-MCNC: 3.9 G/DL (ref 1.9–3.5)
GLUCOSE SERPL-MCNC: 169 MG/DL (ref 65–99)
GLUCOSE UR STRIP.AUTO-MCNC: NEGATIVE MG/DL
HCT VFR BLD AUTO: 49.8 % (ref 42–52)
HGB BLD-MCNC: 17.1 G/DL (ref 14–18)
IMM GRANULOCYTES # BLD AUTO: 0.08 K/UL (ref 0–0.11)
IMM GRANULOCYTES NFR BLD AUTO: 0.7 % (ref 0–0.9)
KETONES UR STRIP.AUTO-MCNC: 15 MG/DL
LACTATE BLD-SCNC: 3.2 MMOL/L (ref 0.5–2)
LEUKOCYTE ESTERASE UR QL STRIP.AUTO: ABNORMAL
LIPASE SERPL-CCNC: 18 U/L (ref 7–58)
LYMPHOCYTES # BLD AUTO: 1.22 K/UL (ref 1–4.8)
LYMPHOCYTES NFR BLD: 10.7 % (ref 22–41)
MCH RBC QN AUTO: 32.4 PG (ref 27–33)
MCHC RBC AUTO-ENTMCNC: 34.3 G/DL (ref 33.7–35.3)
MCV RBC AUTO: 94.5 FL (ref 81.4–97.8)
MICRO URNS: ABNORMAL
MONOCYTES # BLD AUTO: 1.19 K/UL (ref 0–0.85)
MONOCYTES NFR BLD AUTO: 10.4 % (ref 0–13.4)
NEUTROPHILS # BLD AUTO: 8.91 K/UL (ref 1.82–7.42)
NEUTROPHILS NFR BLD: 77.9 % (ref 44–72)
NITRITE UR QL STRIP.AUTO: NEGATIVE
NRBC # BLD AUTO: 0 K/UL
NRBC BLD-RTO: 0 /100 WBC
PH UR STRIP.AUTO: 5.5 [PH] (ref 5–8)
PLATELET # BLD AUTO: 223 K/UL (ref 164–446)
PMV BLD AUTO: 9.8 FL (ref 9–12.9)
POTASSIUM SERPL-SCNC: 4.7 MMOL/L (ref 3.6–5.5)
PROT SERPL-MCNC: 8.1 G/DL (ref 6–8.2)
PROT UR QL STRIP: 100 MG/DL
RBC # BLD AUTO: 5.27 M/UL (ref 4.7–6.1)
RBC # URNS HPF: >150 /HPF
RBC UR QL AUTO: ABNORMAL
SODIUM SERPL-SCNC: 133 MMOL/L (ref 135–145)
SP GR UR REFRACTOMETRY: 1.02
TROPONIN T SERPL-MCNC: 20 NG/L (ref 6–19)
WBC # BLD AUTO: 11.4 K/UL (ref 4.8–10.8)
WBC #/AREA URNS HPF: ABNORMAL /HPF

## 2019-08-24 PROCEDURE — 83690 ASSAY OF LIPASE: CPT

## 2019-08-24 PROCEDURE — 96375 TX/PRO/DX INJ NEW DRUG ADDON: CPT

## 2019-08-24 PROCEDURE — A9270 NON-COVERED ITEM OR SERVICE: HCPCS | Performed by: INTERNAL MEDICINE

## 2019-08-24 PROCEDURE — 93005 ELECTROCARDIOGRAM TRACING: CPT | Performed by: EMERGENCY MEDICINE

## 2019-08-24 PROCEDURE — 99221 1ST HOSP IP/OBS SF/LOW 40: CPT | Performed by: INTERNAL MEDICINE

## 2019-08-24 PROCEDURE — 99285 EMERGENCY DEPT VISIT HI MDM: CPT

## 2019-08-24 PROCEDURE — 700102 HCHG RX REV CODE 250 W/ 637 OVERRIDE(OP): Performed by: INTERNAL MEDICINE

## 2019-08-24 PROCEDURE — 700102 HCHG RX REV CODE 250 W/ 637 OVERRIDE(OP)

## 2019-08-24 PROCEDURE — 87086 URINE CULTURE/COLONY COUNT: CPT

## 2019-08-24 PROCEDURE — 700111 HCHG RX REV CODE 636 W/ 250 OVERRIDE (IP): Performed by: EMERGENCY MEDICINE

## 2019-08-24 PROCEDURE — 700105 HCHG RX REV CODE 258: Performed by: EMERGENCY MEDICINE

## 2019-08-24 PROCEDURE — 96365 THER/PROPH/DIAG IV INF INIT: CPT

## 2019-08-24 PROCEDURE — 80053 COMPREHEN METABOLIC PANEL: CPT

## 2019-08-24 PROCEDURE — 85025 COMPLETE CBC W/AUTO DIFF WBC: CPT

## 2019-08-24 PROCEDURE — 36415 COLL VENOUS BLD VENIPUNCTURE: CPT

## 2019-08-24 PROCEDURE — 84484 ASSAY OF TROPONIN QUANT: CPT

## 2019-08-24 PROCEDURE — 770006 HCHG ROOM/CARE - MED/SURG/GYN SEMI*

## 2019-08-24 PROCEDURE — 81001 URINALYSIS AUTO W/SCOPE: CPT

## 2019-08-24 PROCEDURE — 700105 HCHG RX REV CODE 258: Performed by: INTERNAL MEDICINE

## 2019-08-24 PROCEDURE — 87040 BLOOD CULTURE FOR BACTERIA: CPT

## 2019-08-24 PROCEDURE — 83605 ASSAY OF LACTIC ACID: CPT

## 2019-08-24 PROCEDURE — A9270 NON-COVERED ITEM OR SERVICE: HCPCS

## 2019-08-24 RX ORDER — ATORVASTATIN CALCIUM 40 MG/1
40 TABLET, FILM COATED ORAL EVERY EVENING
Status: DISCONTINUED | OUTPATIENT
Start: 2019-08-24 | End: 2019-08-25 | Stop reason: HOSPADM

## 2019-08-24 RX ORDER — ONDANSETRON 2 MG/ML
4 INJECTION INTRAMUSCULAR; INTRAVENOUS ONCE
Status: COMPLETED | OUTPATIENT
Start: 2019-08-24 | End: 2019-08-24

## 2019-08-24 RX ORDER — AMOXICILLIN 250 MG
2 CAPSULE ORAL 2 TIMES DAILY
Status: DISCONTINUED | OUTPATIENT
Start: 2019-08-24 | End: 2019-08-25 | Stop reason: HOSPADM

## 2019-08-24 RX ORDER — ONDANSETRON 2 MG/ML
4 INJECTION INTRAMUSCULAR; INTRAVENOUS EVERY 4 HOURS PRN
Status: DISCONTINUED | OUTPATIENT
Start: 2019-08-24 | End: 2019-08-25 | Stop reason: HOSPADM

## 2019-08-24 RX ORDER — FLUTICASONE PROPIONATE 50 MCG
2 SPRAY, SUSPENSION (ML) NASAL DAILY
Status: DISCONTINUED | OUTPATIENT
Start: 2019-08-25 | End: 2019-08-25 | Stop reason: HOSPADM

## 2019-08-24 RX ORDER — NITROFURANTOIN 25; 75 MG/1; MG/1
100 CAPSULE ORAL 2 TIMES DAILY
Status: ON HOLD | COMMUNITY
Start: 2019-08-22 | End: 2019-08-25

## 2019-08-24 RX ORDER — ONDANSETRON 4 MG/1
4 TABLET, ORALLY DISINTEGRATING ORAL EVERY 4 HOURS PRN
Status: DISCONTINUED | OUTPATIENT
Start: 2019-08-24 | End: 2019-08-25 | Stop reason: HOSPADM

## 2019-08-24 RX ORDER — NIACIN 500 MG
1000 TABLET ORAL DAILY
Status: DISCONTINUED | OUTPATIENT
Start: 2019-08-25 | End: 2019-08-25 | Stop reason: HOSPADM

## 2019-08-24 RX ORDER — ACETAMINOPHEN 325 MG/1
650 TABLET ORAL EVERY 6 HOURS PRN
Status: DISCONTINUED | OUTPATIENT
Start: 2019-08-24 | End: 2019-08-25 | Stop reason: HOSPADM

## 2019-08-24 RX ORDER — METFORMIN HYDROCHLORIDE 750 MG/1
750 TABLET, EXTENDED RELEASE ORAL 2 TIMES DAILY
Status: DISCONTINUED | OUTPATIENT
Start: 2019-08-24 | End: 2019-08-25

## 2019-08-24 RX ORDER — VITAMIN C
1 TAB ORAL DAILY
Status: DISCONTINUED | OUTPATIENT
Start: 2019-08-25 | End: 2019-08-25 | Stop reason: HOSPADM

## 2019-08-24 RX ORDER — FINASTERIDE 5 MG/1
5 TABLET, FILM COATED ORAL EVERY MORNING
Status: DISCONTINUED | OUTPATIENT
Start: 2019-08-25 | End: 2019-08-25 | Stop reason: HOSPADM

## 2019-08-24 RX ORDER — POLYETHYLENE GLYCOL 3350 17 G/17G
1 POWDER, FOR SOLUTION ORAL
Status: DISCONTINUED | OUTPATIENT
Start: 2019-08-24 | End: 2019-08-25 | Stop reason: HOSPADM

## 2019-08-24 RX ORDER — SODIUM CHLORIDE 9 MG/ML
INJECTION, SOLUTION INTRAVENOUS CONTINUOUS
Status: DISCONTINUED | OUTPATIENT
Start: 2019-08-24 | End: 2019-08-25 | Stop reason: HOSPADM

## 2019-08-24 RX ORDER — SODIUM CHLORIDE, SODIUM LACTATE, POTASSIUM CHLORIDE, CALCIUM CHLORIDE 600; 310; 30; 20 MG/100ML; MG/100ML; MG/100ML; MG/100ML
1000 INJECTION, SOLUTION INTRAVENOUS ONCE
Status: COMPLETED | OUTPATIENT
Start: 2019-08-24 | End: 2019-08-24

## 2019-08-24 RX ORDER — OLOPATADINE HYDROCHLORIDE 1 MG/ML
1-2 SOLUTION/ DROPS OPHTHALMIC 2 TIMES DAILY
Status: DISCONTINUED | OUTPATIENT
Start: 2019-08-24 | End: 2019-08-25 | Stop reason: HOSPADM

## 2019-08-24 RX ORDER — METOPROLOL SUCCINATE 100 MG/1
100 TABLET, EXTENDED RELEASE ORAL EVERY MORNING
Status: DISCONTINUED | OUTPATIENT
Start: 2019-08-24 | End: 2019-08-25 | Stop reason: HOSPADM

## 2019-08-24 RX ORDER — FAMOTIDINE 20 MG/1
40 TABLET, FILM COATED ORAL DAILY
Status: DISCONTINUED | OUTPATIENT
Start: 2019-08-25 | End: 2019-08-25 | Stop reason: HOSPADM

## 2019-08-24 RX ORDER — LEVOCETIRIZINE DIHYDROCHLORIDE 5 MG/1
5 TABLET, FILM COATED ORAL DAILY
Status: DISCONTINUED | OUTPATIENT
Start: 2019-08-24 | End: 2019-08-24

## 2019-08-24 RX ORDER — BISACODYL 10 MG
10 SUPPOSITORY, RECTAL RECTAL
Status: DISCONTINUED | OUTPATIENT
Start: 2019-08-24 | End: 2019-08-25 | Stop reason: HOSPADM

## 2019-08-24 RX ADMIN — OLOPATADINE HYDROCHLORIDE 1 DROP: 1 SOLUTION/ DROPS OPHTHALMIC at 18:15

## 2019-08-24 RX ADMIN — ACETAMINOPHEN 650 MG: 325 TABLET, FILM COATED ORAL at 16:12

## 2019-08-24 RX ADMIN — APIXABAN 5 MG: 5 TABLET, FILM COATED ORAL at 16:01

## 2019-08-24 RX ADMIN — ONDANSETRON 4 MG: 2 INJECTION INTRAMUSCULAR; INTRAVENOUS at 09:18

## 2019-08-24 RX ADMIN — SODIUM CHLORIDE: 9 INJECTION, SOLUTION INTRAVENOUS at 16:06

## 2019-08-24 RX ADMIN — ATORVASTATIN CALCIUM 10 MG: 40 TABLET, FILM COATED ORAL at 16:01

## 2019-08-24 RX ADMIN — CEFTRIAXONE SODIUM 2 G: 2 INJECTION, POWDER, FOR SOLUTION INTRAMUSCULAR; INTRAVENOUS at 09:18

## 2019-08-24 RX ADMIN — SODIUM CHLORIDE, POTASSIUM CHLORIDE, SODIUM LACTATE AND CALCIUM CHLORIDE 1000 ML: 600; 310; 30; 20 INJECTION, SOLUTION INTRAVENOUS at 10:02

## 2019-08-24 ASSESSMENT — LIFESTYLE VARIABLES
TOTAL SCORE: 0
EVER HAD A DRINK FIRST THING IN THE MORNING TO STEADY YOUR NERVES TO GET RID OF A HANGOVER: NO
ALCOHOL_USE: YES
CONSUMPTION TOTAL: NEGATIVE
PACK_YEARS: 1
HAVE YOU EVER FELT YOU SHOULD CUT DOWN ON YOUR DRINKING: NO
AVERAGE NUMBER OF DAYS PER WEEK YOU HAVE A DRINK CONTAINING ALCOHOL: 7
ON A TYPICAL DAY WHEN YOU DRINK ALCOHOL HOW MANY DRINKS DO YOU HAVE: 1
HAVE PEOPLE ANNOYED YOU BY CRITICIZING YOUR DRINKING: NO
EVER FELT BAD OR GUILTY ABOUT YOUR DRINKING: NO
HOW MANY TIMES IN THE PAST YEAR HAVE YOU HAD 5 OR MORE DRINKS IN A DAY: 0
DOES PATIENT WANT TO STOP DRINKING: NO
EVER_SMOKED: YES

## 2019-08-24 ASSESSMENT — COGNITIVE AND FUNCTIONAL STATUS - GENERAL
DAILY ACTIVITIY SCORE: 24
SUGGESTED CMS G CODE MODIFIER MOBILITY: CH
SUGGESTED CMS G CODE MODIFIER DAILY ACTIVITY: CH
MOBILITY SCORE: 24

## 2019-08-24 ASSESSMENT — ENCOUNTER SYMPTOMS
NAUSEA: 0
PALPITATIONS: 0
CHILLS: 1
VOMITING: 1
SPUTUM PRODUCTION: 0
WHEEZING: 0
SHORTNESS OF BREATH: 0
MYALGIAS: 0
ABDOMINAL PAIN: 0
DIAPHORESIS: 0
DIARRHEA: 0
DEPRESSION: 0
HEADACHES: 0
DIZZINESS: 0
FEVER: 0
NERVOUS/ANXIOUS: 1
CONSTIPATION: 0
TREMORS: 0
COUGH: 0

## 2019-08-24 ASSESSMENT — PATIENT HEALTH QUESTIONNAIRE - PHQ9
1. LITTLE INTEREST OR PLEASURE IN DOING THINGS: NOT AT ALL
SUM OF ALL RESPONSES TO PHQ9 QUESTIONS 1 AND 2: 0
2. FEELING DOWN, DEPRESSED, IRRITABLE, OR HOPELESS: NOT AT ALL
SUM OF ALL RESPONSES TO PHQ9 QUESTIONS 1 AND 2: 0

## 2019-08-24 ASSESSMENT — PAIN SCALES - WONG BAKER
WONGBAKER_NUMERICALRESPONSE: DOESN'T HURT AT ALL
WONGBAKER_NUMERICALRESPONSE: DOESN'T HURT AT ALL

## 2019-08-24 NOTE — ASSESSMENT & PLAN NOTE
Due to recent procedure for enlarged prostate on July 29 with Dr. Guerra in [place]  Patient had indwelling ha for two weeks and then did straight catheterization until two days ago  Rocephin for infection, culture ordered

## 2019-08-24 NOTE — ED NOTES
0824:  PIV placed in RAC, blood and BC x 1 drawn and sent to lab  0840:  Urine collected and sent to lab  0902:  BC x 2 drawn and sent to lab.  EKG in process.  Pt and spouse updated on POC including pending tests and chart review by ERP  0918:  Antibx infusing as ordered.  ERP at bedside to discuss POC including pending admission

## 2019-08-24 NOTE — ED NOTES
Med Rec updated and complete per pt at bedside  Allergies have been verified and updated  Pt home pharmacy:[facility]      Pt reports that he is on  a 14 day course of MACROBID that was started on 08/22/2019

## 2019-08-24 NOTE — ED TRIAGE NOTES
"Presents accompanied by family.  Pt C/O generalized weakness, and hypertension escalating for the past 5 days.  He also reports acute onset of N/V since this AM.  Chief Complaint   Patient presents with   • Weakness   • Hypertension     /89   Pulse 99   Temp 37.1 °C (98.7 °F) (Temporal)   Resp 20   Ht 1.753 m (5' 9\")   Wt 64 kg (141 lb 1.5 oz)   SpO2 95%   BMI 20.84 kg/m²     "

## 2019-08-24 NOTE — ED NOTES
Pt reports had prostate surgery at the end of July, has been treated for UTI, continues to have dysuria and blood in urine.  Pt reports over the last 5 days has felt increasingly weak.  PIV placed in RAC, blood and BC x 1 drawn and sent to lab.  Pt aware need urine sample, instructed on clean catch technique

## 2019-08-24 NOTE — H&P
"Hospital Medicine History & Physical Note    Date of Service  8/24/2019    Primary Care Physician  Douglas Weiss M.D.    Consultants  none    Code Status  full    Chief Complaint  Vomiting and rigors    History of Presenting Illness  74 y.o. male who presented 8/24/2019 with progressive weakness and rigors since taking Bactrim for the last 4 days for a urinary infection. He was at home this morning and has been sleeping most of the day yesterday and eating little, but this morning was vomiting and shaking uncontrollably. He had a \"steam\" procedure done July 29 with Dr. Guerra from urology for an enlarged prostate and had an indwelling ha catheter for two weeks after the procedure. He then was using straight catheterization until two days ago. Last week he was noted to have a urinary infection and told it was e. Coli in culture and started on bactrim which he has been taking for 4 days. However he did not feel improvement on this medication.    Review of Systems  Review of Systems   Constitutional: Positive for chills and malaise/fatigue. Negative for diaphoresis and fever.        Poor appetite   HENT: Positive for hearing loss. Negative for congestion.    Respiratory: Negative for cough, sputum production, shortness of breath and wheezing.    Cardiovascular: Negative for chest pain and palpitations.   Gastrointestinal: Positive for vomiting. Negative for abdominal pain, constipation, diarrhea and nausea.   Genitourinary: Positive for hematuria. Negative for dysuria, frequency and urgency.        Dark urine with some bright red blood when first voiding   Musculoskeletal: Negative for myalgias.   Skin: Negative for itching and rash.   Neurological: Negative for dizziness, tremors and headaches.   Psychiatric/Behavioral: Negative for depression. The patient is nervous/anxious.        Past Medical History   has a past medical history of Bowel habit changes, Cataract, Cold (12/2018), Diabetes ([facility]) " (12/27/2018), Environmental and seasonal allergies, Heart burn, Heart valve disease, Hiatus hernia syndrome, High cholesterol, History of BPH, Indigestion, Skin cancer, Snoring, and TIA (transient ischemic attack).    Surgical History   has a past surgical history that includes other surgical procedure (1985/2014) and ectropian repair (Bilateral, 1/16/2019).     Family History  family history is not on file.     Social History   reports that he quit smoking about 53 years ago. His smoking use included cigarettes. He has a 6.00 pack-year smoking history. He has never used smokeless tobacco. He reports that he drinks alcohol. He reports that he does not use drugs.    Allergies  Allergies   Allergen Reactions   • Other Food Unspecified     Reports multiple food related allergies.       Medications  Prior to Admission Medications   Prescriptions Last Dose Informant Patient Reported? Taking?   B Complex Vitamins (B COMPLEX PO) 8/24/2019 at 0400 Patient Yes No   Sig: Take 1 Tab by mouth every day.   Cholecalciferol (VITAMIN D3) 5000 units Cap 8/24/2019 at 0400 Patient Yes No   Sig: Take 1 Cap by mouth every day.   ELIQUIS 5 MG Tab 8/24/2019 at 0400 Patient Yes No   Sig: Take 5 mg by mouth 2 Times a Day.   Levocetirizine Dihydrochloride 5 MG Tab 8/24/2019 at 0400 Patient Yes No   Sig: Take 5 mg by mouth every day.   atorvastatin (LIPITOR) 40 MG Tab 8/23/2019 at pm Patient Yes No   Sig: Take 40 mg by mouth every evening.   coenzyme Q-10 30 MG capsule 8/24/2019 at 0400 Patient Yes No   Sig: Take 60 mg by mouth every day.   dutasteride (AVODART) 0.5 MG capsule 8/24/2019 at 0400 Patient Yes No   Sig: Take 0.5 mg by mouth every morning.   fluticasone (FLONASE) 50 MCG/ACT nasal spray 8/24/2019 at 0400 Patient Yes No   Sig: Spray 2 Sprays in nose every day.   metformin ER (GLUCOPHAGE XR) 750 MG TABLET SR 24 HR 8/24/2019 at 0400 Patient Yes No   Sig: Take 750 mg by mouth 2 times a day.   metoprolol SR (TOPROL XL) 100 MG TABLET SR  24 HR 8/24/2019 at 0400 Patient Yes No   Sig: Take 100 mg by mouth every morning.   niacin 500 MG Tab 8/24/2019 at 0400 Patient Yes No   Sig: Take 1,000 mg by mouth every day. OTC   nitrofurantoin monohyd macro (MACROBID) 100 MG Cap 8/24/2019 at 0400 Rx Bottle (For Med Information) Yes Yes   Sig: Take 100 mg by mouth 2 times a day. 14 day course   olopatadine (PATANOL) 0.1 % ophthalmic solution 8/24/2019 at 0400 Patient Yes No   Sig: Place 1-2 Drops in both eyes 2 Times a Day. 2 drops morning  1 drop in the evening   polyethylene glycol/lytes (MIRALAX) Pack 8/24/2019 at 0400 Patient Yes No   Sig: Take 17 g by mouth every day. Indications: Constipation   ranitidine (ZANTAC) 300 MG tablet 8/24/2019 at 0400 Patient Yes No   Sig: Take 300 mg by mouth every day.      Facility-Administered Medications: None       Physical Exam  Temp:  [37.1 °C (98.7 °F)] 37.1 °C (98.7 °F)  Pulse:  [] 102  Resp:  [15-21] 21  BP: (135-145)/(79-89) 145/82  SpO2:  [91 %-95 %] 91 %    Physical Exam   Constitutional: He is oriented to person, place, and time. No distress.   HENT:   Mouth/Throat: Oropharynx is clear and moist. No oropharyngeal exudate.   Eyes: Pupils are equal, round, and reactive to light. Conjunctivae are normal. No scleral icterus.   Neck: Neck supple. No tracheal deviation present.   Cardiovascular: Normal rate, regular rhythm and intact distal pulses.   No murmur heard.  Pulmonary/Chest: Effort normal and breath sounds normal.   Abdominal: Bowel sounds are normal. He exhibits no distension. There is no tenderness.   Musculoskeletal: He exhibits no edema.   Neurological: He is alert and oriented to person, place, and time.   Skin: Skin is warm and dry. No rash noted. He is not diaphoretic.   Nursing note and vitals reviewed.      Laboratory:  Recent Labs     08/24/19  0824   WBC 11.4*   RBC 5.27   HEMOGLOBIN 17.1   HEMATOCRIT 49.8   MCV 94.5   MCH 32.4   MCHC 34.3   RDW 44.8   PLATELETCT 223   MPV 9.8     Recent Labs      08/24/19  0824   SODIUM 133*   POTASSIUM 4.7   CHLORIDE 96   CO2 23   GLUCOSE 169*   BUN 22   CREATININE 0.71   CALCIUM 9.7     Recent Labs     08/24/19  0824   ALTSGPT 25   ASTSGOT 31   ALKPHOSPHAT 115*   TBILIRUBIN 1.7*   LIPASE 18   GLUCOSE 169*         No results for input(s): NTPROBNP in the last 72 hours.      Recent Labs     08/24/19  0824   TROPONINT 20*       Urinalysis:    Recent Labs     08/24/19  0840   SPECGRAVITY 1.020   GLUCOSEUR Negative   KETONES 15*   NITRITE Negative   LEUKESTERAS Trace*   WBCURINE 100-150*   RBCURINE >150*   BACTERIA Few*        Imaging:  No orders to display         Assessment/Plan:  I anticipate this patient will require at least two midnights for appropriate medical management, necessitating inpatient admission.    Complicated UTI (urinary tract infection)  Assessment & Plan  Due to recent procedure for enlarged prostate on July 29 with Dr. Guerra in [place]  Patient had indwelling ha for two weeks and then did straight catheterization until two days ago  Rocephin for infection, culture ordered    DM2 (diabetes mellitus, type 2) (HCC)- (present on admission)  Assessment & Plan  Continue home metformin  Consistent carb diet ordered  Monitor blood sugar    BPH (benign prostatic hyperplasia)- (present on admission)  Assessment & Plan  Procedure done, continue avodart  Monitor for retention of urine    Hyponatremia- (present on admission)  Assessment & Plan  Due to dehydration with vomiting  Monitor labs  Give fluids        VTE prophylaxis: eliquis

## 2019-08-24 NOTE — ED PROVIDER NOTES
ED Provider Note    ER PROVIDER NOTE      CHIEF COMPLAINT  Chief Complaint   Patient presents with   • Weakness   • Hypertension   • N/V       HPI  Laci Baugh is a 74 y.o. male who presents to the emergency department complaining of generalized weakness as well as some dysuria and vomiting.  Patient had a prostate procedure done 3 weeks ago, had prolonged catheter.  He reports some hematuria that has improved, however some new dysuria.  He had a positive urinalysis and started on Macrobid on Wednesday from his urologist in [place], Dr. Guerra.  Over the last 5 days has had some progressive weakness, this morning began to feel nauseated with an episode of emesis.  He denies any fevers but has felt chills.  He denies any abdominal pain.  No cough, no chest pain, no shortness of breath.  His wife is also worried because his blood pressure was higher than normal this morning at 150/100    REVIEW OF SYSTEMS  Pertinent positives include nausea and vomiting. Pertinent negatives include no abdominal pain. See HPI for details. All other systems reviewed and are negative.    PAST MEDICAL HISTORY   has a past medical history of Bowel habit changes, Cataract, Cold (12/2018), Diabetes (HCC) (12/27/2018), Environmental and seasonal allergies, Heart burn, Heart valve disease, Hiatus hernia syndrome, High cholesterol, History of BPH, Indigestion, Skin cancer, Snoring, and TIA (transient ischemic attack).    SURGICAL HISTORY   has a past surgical history that includes other surgical procedure (1985/2014) and ectropian repair (Bilateral, 1/16/2019).    FAMILY HISTORY  History reviewed. No pertinent family history.    SOCIAL HISTORY  Social History     Socioeconomic History   • Marital status:      Spouse name: Not on file   • Number of children: Not on file   • Years of education: Not on file   • Highest education level: Not on file   Occupational History   • Not on file   Social Needs   • Financial resource  "strain: Not on file   • Food insecurity:     Worry: Not on file     Inability: Not on file   • Transportation needs:     Medical: Not on file     Non-medical: Not on file   Tobacco Use   • Smoking status: Former Smoker     Packs/day: 1.00     Years: 6.00     Pack years: 6.00     Types: Cigarettes     Last attempt to quit: 1966     Years since quittin.6   • Smokeless tobacco: Never Used   Substance and Sexual Activity   • Alcohol use: Yes     Alcohol/week: 0.0 oz     Comment: 1 glass wine/night   • Drug use: No   • Sexual activity: Not on file   Lifestyle   • Physical activity:     Days per week: Not on file     Minutes per session: Not on file   • Stress: Not on file   Relationships   • Social connections:     Talks on phone: Not on file     Gets together: Not on file     Attends [employer] service: Not on file     Active member of club or organization: Not on file     Attends meetings of clubs or organizations: Not on file     Relationship status: Not on file   • Intimate partner violence:     Fear of current or ex partner: Not on file     Emotionally abused: Not on file     Physically abused: Not on file     Forced sexual activity: Not on file   Other Topics Concern   • Not on file   Social History Narrative   • Not on file      Social History     Substance and Sexual Activity   Drug Use No       CURRENT MEDICATIONS  Home Medications    **Home medications have not yet been reviewed for this encounter**         ALLERGIES  Allergies   Allergen Reactions   • Other Food Unspecified     Reports multiple food related allergies.       PHYSICAL EXAM  VITAL SIGNS: /82   Pulse (!) 102   Temp 37.1 °C (98.7 °F) (Temporal)   Resp (!) 21   Ht 1.753 m (5' 9\")   Wt 64 kg (141 lb 1.5 oz)   SpO2 91%   BMI 20.84 kg/m²   Pulse ox interpretation: I interpret this pulse ox as normal.    Constitutional: Alert in no apparent distress.  HENT: No signs of trauma, Bilateral external ears normal, Nose normal.   Eyes: " Pupils are equal and reactive, Conjunctiva normal, Non-icteric.   Neck: Normal range of motion, No tenderness, Supple, No stridor.   Lymphatic: No lymphadenopathy noted.   Cardiovascular: Tachycardic, regular rhythm, no murmurs.   Thorax & Lungs: Normal breath sounds, No respiratory distress, No wheezing, No chest tenderness.   Abdomen: Bowel sounds normal, Soft, No tenderness, No masses, No pulsatile masses. No peritoneal signs.  Skin: Warm, Dry, No erythema, No rash.   Back: No bony tenderness, No CVA tenderness.   Extremities: Intact distal pulses, No edema, No tenderness, No cyanosis, Negative Kandy's sign.  Musculoskeletal: Good range of motion in all major joints. No tenderness to palpation or major deformities noted.   Neurologic: Alert , Normal motor function, Normal sensory function, No focal deficits noted.   Psychiatric: Affect normal, Judgment normal, Mood normal.     DIAGNOSTIC STUDIES / PROCEDURES    Results for orders placed or performed during the hospital encounter of 08/24/19   CBC WITH DIFFERENTIAL   Result Value Ref Range    WBC 11.4 (H) 4.8 - 10.8 K/uL    RBC 5.27 4.70 - 6.10 M/uL    Hemoglobin 17.1 14.0 - 18.0 g/dL    Hematocrit 49.8 42.0 - 52.0 %    MCV 94.5 81.4 - 97.8 fL    MCH 32.4 27.0 - 33.0 pg    MCHC 34.3 33.7 - 35.3 g/dL    RDW 44.8 35.9 - 50.0 fL    Platelet Count 223 164 - 446 K/uL    MPV 9.8 9.0 - 12.9 fL    Neutrophils-Polys 77.90 (H) 44.00 - 72.00 %    Lymphocytes 10.70 (L) 22.00 - 41.00 %    Monocytes 10.40 0.00 - 13.40 %    Eosinophils 0.00 0.00 - 6.90 %    Basophils 0.30 0.00 - 1.80 %    Immature Granulocytes 0.70 0.00 - 0.90 %    Nucleated RBC 0.00 /100 WBC    Neutrophils (Absolute) 8.91 (H) 1.82 - 7.42 K/uL    Lymphs (Absolute) 1.22 1.00 - 4.80 K/uL    Monos (Absolute) 1.19 (H) 0.00 - 0.85 K/uL    Eos (Absolute) 0.00 0.00 - 0.51 K/uL    Baso (Absolute) 0.03 0.00 - 0.12 K/uL    Immature Granulocytes (abs) 0.08 0.00 - 0.11 K/uL    NRBC (Absolute) 0.00 K/uL   COMP METABOLIC  PANEL   Result Value Ref Range    Sodium 133 (L) 135 - 145 mmol/L    Potassium 4.7 3.6 - 5.5 mmol/L    Chloride 96 96 - 112 mmol/L    Co2 23 20 - 33 mmol/L    Anion Gap 14.0 (H) 0.0 - 11.9    Glucose 169 (H) 65 - 99 mg/dL    Bun 22 8 - 22 mg/dL    Creatinine 0.71 0.50 - 1.40 mg/dL    Calcium 9.7 8.4 - 10.2 mg/dL    AST(SGOT) 31 12 - 45 U/L    ALT(SGPT) 25 2 - 50 U/L    Alkaline Phosphatase 115 (H) 30 - 99 U/L    Total Bilirubin 1.7 (H) 0.1 - 1.5 mg/dL    Albumin 4.2 3.2 - 4.9 g/dL    Total Protein 8.1 6.0 - 8.2 g/dL    Globulin 3.9 (H) 1.9 - 3.5 g/dL    A-G Ratio 1.1 g/dL   LIPASE   Result Value Ref Range    Lipase 18 7 - 58 U/L   TROPONIN   Result Value Ref Range    Troponin T 20 (H) 6 - 19 ng/L   URINALYSIS (UA)   Result Value Ref Range    Color Yellow     Character Hazy (A)     Ph 5.5 5.0 - 8.0    Glucose Negative Negative mg/dL    Ketones 15 (A) Negative mg/dL    Protein 100 (A) Negative mg/dL    Bilirubin Negative Negative    Nitrite Negative Negative    Leukocyte Esterase Trace (A) Negative    Occult Blood Large (A) Negative    Micro Urine Req Microscopic    LACTIC ACID   Result Value Ref Range    Lactic Acid 3.2 (H) 0.5 - 2.0 mmol/L   REFRACTOMETER SG   Result Value Ref Range    Specific Gravity 1.020    URINE MICROSCOPIC (W/UA)   Result Value Ref Range    -150 (A) /hpf    RBC >150 (A) /hpf    Bacteria Few (A) None /hpf   ESTIMATED GFR   Result Value Ref Range    GFR If African American >60 >60 mL/min/1.73 m 2    GFR If Non African American >60 >60 mL/min/1.73 m 2         RADIOLOGY  No orders to display     The radiologist's interpretation of all radiological studies have been reviewed by me.    COURSE & MEDICAL DECISION MAKING  Nursing notes, VS, PMSFHx reviewed in chart.    8:24 AM Patient seen and examined at bedside. Patient will be treated with IV fluids, Zofran. Ordered for sepsis bundle to evaluate his symptoms.     9:23 AM  Patient reevaluated, updated on results, plan for admission.   Hospitalist paged    HYDRATION: Based on the patient's presentation of Sepsis and Tachycardia the patient was given IV fluids. IV Hydration was used because oral hydration was not as rapid as required. Upon recheck following hydration, the patient was improved.    Decision Making:  This is a 74 y.o. male presenting with some generalized weakness as well as nausea and vomiting.  He is currently being treated for urinary tract infection and at this point appears to have persistent infection that is failed his outpatient antibiotics, Macrobid.  Additionally appears with early sepsis with his leukocytosis and tachycardia and lactic acidosis.  He has been started on fluid resuscitation, IV antibiotics, Rocephin and admitted for further care.  He has had no hypotension to mandate large fluid resuscitation or pressors.  No evidence of significant electrolyte disturbance.  Symptoms do not seem cardiac in nature    Patient is admitted in guarded condition      FINAL IMPRESSION  1. Weakness    2. Urinary tract infection with hematuria, site unspecified         The note accurately reflects work and decisions made by me.  Christopher Weiss  8/24/2019  10:02 AM

## 2019-08-25 ENCOUNTER — APPOINTMENT (OUTPATIENT)
Dept: RADIOLOGY | Facility: MEDICAL CENTER | Age: 74
DRG: 863 | End: 2019-08-25
Attending: INTERNAL MEDICINE
Payer: COMMERCIAL

## 2019-08-25 VITALS
RESPIRATION RATE: 18 BRPM | DIASTOLIC BLOOD PRESSURE: 55 MMHG | HEART RATE: 103 BPM | HEIGHT: 69 IN | OXYGEN SATURATION: 91 % | TEMPERATURE: 97.9 F | SYSTOLIC BLOOD PRESSURE: 97 MMHG | WEIGHT: 145.94 LBS | BODY MASS INDEX: 21.62 KG/M2

## 2019-08-25 LAB
ANION GAP SERPL CALC-SCNC: 10 MMOL/L (ref 0–11.9)
BASOPHILS # BLD AUTO: 0.3 % (ref 0–1.8)
BASOPHILS # BLD: 0.02 K/UL (ref 0–0.12)
BUN SERPL-MCNC: 17 MG/DL (ref 8–22)
CALCIUM SERPL-MCNC: 8.6 MG/DL (ref 8.4–10.2)
CHLORIDE SERPL-SCNC: 101 MMOL/L (ref 96–112)
CO2 SERPL-SCNC: 23 MMOL/L (ref 20–33)
CREAT SERPL-MCNC: 0.54 MG/DL (ref 0.5–1.4)
EOSINOPHIL # BLD AUTO: 0.02 K/UL (ref 0–0.51)
EOSINOPHIL NFR BLD: 0.3 % (ref 0–6.9)
ERYTHROCYTE [DISTWIDTH] IN BLOOD BY AUTOMATED COUNT: 45.1 FL (ref 35.9–50)
GLUCOSE SERPL-MCNC: 118 MG/DL (ref 65–99)
HCT VFR BLD AUTO: 44.7 % (ref 42–52)
HGB BLD-MCNC: 15.3 G/DL (ref 14–18)
IMM GRANULOCYTES # BLD AUTO: 0.04 K/UL (ref 0–0.11)
IMM GRANULOCYTES NFR BLD AUTO: 0.5 % (ref 0–0.9)
LYMPHOCYTES # BLD AUTO: 1 K/UL (ref 1–4.8)
LYMPHOCYTES NFR BLD: 13.6 % (ref 22–41)
MCH RBC QN AUTO: 32.4 PG (ref 27–33)
MCHC RBC AUTO-ENTMCNC: 34.2 G/DL (ref 33.7–35.3)
MCV RBC AUTO: 94.7 FL (ref 81.4–97.8)
MONOCYTES # BLD AUTO: 1.26 K/UL (ref 0–0.85)
MONOCYTES NFR BLD AUTO: 17.2 % (ref 0–13.4)
NEUTROPHILS # BLD AUTO: 5 K/UL (ref 1.82–7.42)
NEUTROPHILS NFR BLD: 68.1 % (ref 44–72)
NRBC # BLD AUTO: 0 K/UL
NRBC BLD-RTO: 0 /100 WBC
PLATELET # BLD AUTO: 174 K/UL (ref 164–446)
PMV BLD AUTO: 9.5 FL (ref 9–12.9)
POTASSIUM SERPL-SCNC: 3.7 MMOL/L (ref 3.6–5.5)
RBC # BLD AUTO: 4.72 M/UL (ref 4.7–6.1)
SODIUM SERPL-SCNC: 134 MMOL/L (ref 135–145)
WBC # BLD AUTO: 7.3 K/UL (ref 4.8–10.8)

## 2019-08-25 PROCEDURE — 85025 COMPLETE CBC W/AUTO DIFF WBC: CPT

## 2019-08-25 PROCEDURE — 36415 COLL VENOUS BLD VENIPUNCTURE: CPT

## 2019-08-25 PROCEDURE — 700105 HCHG RX REV CODE 258: Performed by: INTERNAL MEDICINE

## 2019-08-25 PROCEDURE — 700102 HCHG RX REV CODE 250 W/ 637 OVERRIDE(OP): Performed by: INTERNAL MEDICINE

## 2019-08-25 PROCEDURE — 99239 HOSP IP/OBS DSCHRG MGMT >30: CPT | Performed by: INTERNAL MEDICINE

## 2019-08-25 PROCEDURE — A9270 NON-COVERED ITEM OR SERVICE: HCPCS | Performed by: INTERNAL MEDICINE

## 2019-08-25 PROCEDURE — 80048 BASIC METABOLIC PNL TOTAL CA: CPT

## 2019-08-25 PROCEDURE — 700111 HCHG RX REV CODE 636 W/ 250 OVERRIDE (IP): Performed by: INTERNAL MEDICINE

## 2019-08-25 PROCEDURE — 74176 CT ABD & PELVIS W/O CONTRAST: CPT

## 2019-08-25 RX ORDER — CEFDINIR 300 MG/1
300 CAPSULE ORAL 2 TIMES DAILY
Qty: 10 CAP | Refills: 0 | Status: SHIPPED | OUTPATIENT
Start: 2019-08-25 | End: 2019-08-29

## 2019-08-25 RX ORDER — METFORMIN HYDROCHLORIDE 750 MG/1
750 TABLET, EXTENDED RELEASE ORAL 2 TIMES DAILY WITH MEALS
Status: DISCONTINUED | OUTPATIENT
Start: 2019-08-25 | End: 2019-08-25 | Stop reason: HOSPADM

## 2019-08-25 RX ADMIN — OLOPATADINE HYDROCHLORIDE 1 DROP: 1 SOLUTION/ DROPS OPHTHALMIC at 04:53

## 2019-08-25 RX ADMIN — APIXABAN 5 MG: 5 TABLET, FILM COATED ORAL at 10:08

## 2019-08-25 RX ADMIN — VITAMIN C 1 TABLET: TAB at 04:48

## 2019-08-25 RX ADMIN — CEFTRIAXONE SODIUM 2 G: 2 INJECTION, POWDER, FOR SOLUTION INTRAMUSCULAR; INTRAVENOUS at 04:49

## 2019-08-25 RX ADMIN — FINASTERIDE 5 MG: 5 TABLET, FILM COATED ORAL at 04:48

## 2019-08-25 RX ADMIN — FAMOTIDINE 40 MG: 20 TABLET, FILM COATED ORAL at 04:48

## 2019-08-25 RX ADMIN — METOPROLOL SUCCINATE 100 MG: 100 TABLET, EXTENDED RELEASE ORAL at 04:48

## 2019-08-25 RX ADMIN — Medication 1000 MG: at 04:55

## 2019-08-25 RX ADMIN — SENNOSIDES AND DOCUSATE SODIUM 2 TABLET: 8.6; 5 TABLET ORAL at 04:48

## 2019-08-25 RX ADMIN — SODIUM CHLORIDE: 9 INJECTION, SOLUTION INTRAVENOUS at 00:20

## 2019-08-25 RX ADMIN — POLYETHYLENE GLYCOL 3350 1 PACKET: 17 POWDER, FOR SOLUTION ORAL at 10:08

## 2019-08-25 RX ADMIN — ACETAMINOPHEN 650 MG: 325 TABLET, FILM COATED ORAL at 13:37

## 2019-08-25 RX ADMIN — ASPIRIN 81 MG: 81 TABLET, COATED ORAL at 04:49

## 2019-08-25 RX ADMIN — FLUTICASONE PROPIONATE 100 MCG: 50 SPRAY, METERED NASAL at 04:46

## 2019-08-25 NOTE — PROGRESS NOTES
Pt up and awake, sitting on edge of bed. W/. Good awareness for safety. POC- am labs, meds, and assistance for self cath explained/ verbalized understanding. Will continue to monitor.

## 2019-08-25 NOTE — CARE PLAN
Importance and  benefits of frequent handwashing explained to pt every after use of BR , and self cath/ pt verbalized understanding.

## 2019-08-25 NOTE — PROGRESS NOTES
Pt given discharge instructions, pt verbalizes understanding of discharge instructions, all questions answered. IV DC'd. Given prescriptions for omnicef and told to  at [facility]. Told to follow up with urologist as needed and appointment for PCP. Pt and wife with belongings with CNA to car. NAD noted at this time.

## 2019-08-25 NOTE — PROGRESS NOTES
0730: pt awake and at bedside with his wife. Using urinal had blood tinged urine 100ml out. No needs. Will speak with hospitalist regarding eliquis that NOC RN held. Wife and pt requesting US renal for right kidney pain will speak with hospitalist as well.     0800: OK for eliquis, pt to get CT renal, voided 75 mls. Bladder scan completed with 300ml noted     0900: pt down to CT with tech.     0945: Back from CT. Used Urinal with 100ml out.     1115: pt resting, requesting walk later today.

## 2019-08-25 NOTE — DISCHARGE SUMMARY
"Discharge Summary    CHIEF COMPLAINT ON ADMISSION  Chief Complaint   Patient presents with   • Weakness   • Hypertension   • N/V       Reason for Admission  Weak, High Blood Presure     Admission Date  8/24/2019    CODE STATUS  Full Code    HPI & HOSPITAL COURSE  74 y.o. male who presented 8/24/2019 with progressive weakness and rigors since taking Bactrim for the last 4 days for a urinary infection. He was at home this morning and has been sleeping most of the day yesterday and eating little, but this morning was vomiting and shaking uncontrollably. He had a \"steam\" procedure done July 29 with Dr. Guerra from urology for an enlarged prostate and had an indwelling ha catheter for two weeks after the procedure. He then was using straight catheterization until two days ago. Last week he was noted to have a urinary infection and told it was e. Coli in culture and started on bactrim which he has been taking for 4 days. However he did not feel improvement on this medication.  He was admitted and treated with iv rocephin. His fever and rigors resolved. He did have some right flank pain but stated it was similar to his chronic back pain but seemed more severe after sleeping overnight so CT scan was done showing an enlarged bladder and outlet obstruction due to his prostate. He was treated with straight catheterization which he was able to perform himself. The patient improved faster than expected due to his leukocytosis and severe weakness on presentation. His strength improved faster than expected and white cell count  Normalized overnight.       Therefore, he is discharged in good and stable condition to home with close outpatient follow-up.    The patient recovered much more quickly than anticipated on admission.    Discharge Date  8/25/2019    FOLLOW UP ITEMS POST DISCHARGE  Follow up with urology Dr. Guerra in [place]  Stop macrobid and take cefdinir    DISCHARGE DIAGNOSES  Active Problems:    " Hyponatremia POA: Yes    BPH (benign prostatic hyperplasia) POA: Yes    DM2 (diabetes mellitus, type 2) (HCC) POA: Yes    Complicated UTI (urinary tract infection) POA: Yes  Resolved Problems:    * No resolved hospital problems. *      FOLLOW UP  No future appointments.  Douglas Weiss M.D.  [address]  [facility] [address]  [phone]          Francisco J Tolbert M.D.  [address]  [facility] [address]  [phone]      As needed      MEDICATIONS ON DISCHARGE     Medication List      START taking these medications      Instructions   cefdinir 300 MG Caps  Commonly known as:  OMNICEF   Take 1 Cap by mouth 2 times a day for 5 days.  Dose:  300 mg        CONTINUE taking these medications      Instructions   AVODART 0.5 MG capsule  Generic drug:  dutasteride   Take 0.5 mg by mouth every morning.  Dose:  0.5 mg     B COMPLEX PO   Take 1 Tab by mouth every day.  Dose:  1 Tab     coenzyme Q-10 30 MG capsule   Take 60 mg by mouth every day.  Dose:  60 mg     ELIQUIS 5mg Tabs  Generic drug:  apixaban   Take 5 mg by mouth 2 Times a Day.  Dose:  5 mg     fluticasone 50 MCG/ACT nasal spray  Commonly known as:  FLONASE   Spray 2 Sprays in nose every day.  Dose:  2 Spray     Levocetirizine Dihydrochloride 5 MG Tabs   Take 5 mg by mouth every day.  Dose:  5 mg     LIPITOR 40 MG Tabs  Generic drug:  atorvastatin   Take 40 mg by mouth every evening.  Dose:  40 mg     metFORMIN  MG Tb24  Commonly known as:  GLUCOPHAGE XR   Take 750 mg by mouth 2 times a day.  Dose:  750 mg     metoprolol  MG Tb24  Commonly known as:  TOPROL XL   Take 100 mg by mouth every morning.  Dose:  100 mg     niacin 500 MG Tabs   Take 1,000 mg by mouth every day. OTC  Dose:  1,000 mg     olopatadine 0.1 % ophthalmic solution  Commonly known as:  PATANOL   Place 1-2 Drops in both eyes 2 Times a Day. 2 drops morning  1 drop in the evening  Dose:  1-2 Drop     polyethylene glycol/lytes Pack  Commonly known as:  MIRALAX   Take 17 g by  mouth every day. Indications: Constipation  Dose:  17 g     vitamin D3 5000 units Caps   Take 1 Cap by mouth every day.  Dose:  1 Cap     ZANTAC 300 MG tablet  Generic drug:  ranitidine   Take 300 mg by mouth every day.  Dose:  300 mg        STOP taking these medications    nitrofurantoin monohyd macro 100 MG Caps  Commonly known as:  MACROBID            Allergies  Allergies   Allergen Reactions   • Other Food Cough     Reports multiple food related allergies.       DIET  Orders Placed This Encounter   Procedures   • Diet Order Consistent Carbohydrate     Standing Status:   Standing     Number of Occurrences:   1     Order Specific Question:   Diet:     Answer:   Consistent Carbohydrate [4]       ACTIVITY  As tolerated.  Weight bearing as tolerated    CONSULTATIONS  none    PROCEDURES  none    LABORATORY  Lab Results   Component Value Date    SODIUM 134 (L) 08/25/2019    POTASSIUM 3.7 08/25/2019    CHLORIDE 101 08/25/2019    CO2 23 08/25/2019    GLUCOSE 118 (H) 08/25/2019    BUN 17 08/25/2019    CREATININE 0.54 08/25/2019        Lab Results   Component Value Date    WBC 7.3 08/25/2019    HEMOGLOBIN 15.3 08/25/2019    HEMATOCRIT 44.7 08/25/2019    PLATELETCT 174 08/25/2019        Total time of the discharge process exceeds 35 minutes.

## 2019-08-25 NOTE — PROGRESS NOTES
Pt attempted to void,unable to do so.feels uncomfortable,bladder scanned for 390 cc.pt feels he needs a catheter.  Call placed to ,

## 2019-08-25 NOTE — PROGRESS NOTES
Pt arrived on the unit from the er via gurney.ambulated to the br.voided 150 cc of cloudy julio urine.  Oriented to the unit.iv fluids started.eating lunch with wife.

## 2019-08-25 NOTE — PROGRESS NOTES
Pt has been up several times this afternoon to the br.voiding only small amts.did not call for help and did not use the urinal.  Bed alarm on.instructed pt to call for help when getting oob.

## 2019-08-25 NOTE — CARE PLAN
Pt encouraged for use of call light for needs and assistance especially for self catheterization/ verbalized understanding.

## 2019-08-26 LAB
BACTERIA UR CULT: NORMAL
SIGNIFICANT IND 70042: NORMAL
SITE SITE: NORMAL
SOURCE SOURCE: NORMAL

## 2019-08-29 ENCOUNTER — HOSPITAL ENCOUNTER (EMERGENCY)
Facility: MEDICAL CENTER | Age: 74
End: 2019-08-29
Attending: EMERGENCY MEDICINE
Payer: COMMERCIAL

## 2019-08-29 VITALS
DIASTOLIC BLOOD PRESSURE: 56 MMHG | TEMPERATURE: 97 F | HEIGHT: 69 IN | WEIGHT: 142.86 LBS | OXYGEN SATURATION: 99 % | BODY MASS INDEX: 21.16 KG/M2 | RESPIRATION RATE: 16 BRPM | HEART RATE: 87 BPM | SYSTOLIC BLOOD PRESSURE: 94 MMHG

## 2019-08-29 DIAGNOSIS — R53.1 WEAKNESS: ICD-10-CM

## 2019-08-29 LAB
ALBUMIN SERPL BCP-MCNC: 3.9 G/DL (ref 3.2–4.9)
ALBUMIN/GLOB SERPL: 1.1 G/DL
ALP SERPL-CCNC: 95 U/L (ref 30–99)
ALT SERPL-CCNC: 71 U/L (ref 2–50)
ANION GAP SERPL CALC-SCNC: 12 MMOL/L (ref 0–11.9)
APPEARANCE UR: CLEAR
AST SERPL-CCNC: 72 U/L (ref 12–45)
BACTERIA #/AREA URNS HPF: ABNORMAL /HPF
BACTERIA BLD CULT: NORMAL
BACTERIA BLD CULT: NORMAL
BASOPHILS # BLD AUTO: 0.5 % (ref 0–1.8)
BASOPHILS # BLD: 0.03 K/UL (ref 0–0.12)
BILIRUB SERPL-MCNC: 0.7 MG/DL (ref 0.1–1.5)
BILIRUB UR QL STRIP.AUTO: NEGATIVE
BUN SERPL-MCNC: 15 MG/DL (ref 8–22)
CALCIUM SERPL-MCNC: 9.6 MG/DL (ref 8.4–10.2)
CHLORIDE SERPL-SCNC: 104 MMOL/L (ref 96–112)
CO2 SERPL-SCNC: 24 MMOL/L (ref 20–33)
COLOR UR: YELLOW
CREAT SERPL-MCNC: 0.58 MG/DL (ref 0.5–1.4)
EKG IMPRESSION: NORMAL
EOSINOPHIL # BLD AUTO: 0.3 K/UL (ref 0–0.51)
EOSINOPHIL NFR BLD: 4.6 % (ref 0–6.9)
ERYTHROCYTE [DISTWIDTH] IN BLOOD BY AUTOMATED COUNT: 43.5 FL (ref 35.9–50)
GLOBULIN SER CALC-MCNC: 3.4 G/DL (ref 1.9–3.5)
GLUCOSE SERPL-MCNC: 133 MG/DL (ref 65–99)
GLUCOSE UR STRIP.AUTO-MCNC: NEGATIVE MG/DL
HCT VFR BLD AUTO: 44.8 % (ref 42–52)
HGB BLD-MCNC: 15.7 G/DL (ref 14–18)
IMM GRANULOCYTES # BLD AUTO: 0.05 K/UL (ref 0–0.11)
IMM GRANULOCYTES NFR BLD AUTO: 0.8 % (ref 0–0.9)
KETONES UR STRIP.AUTO-MCNC: NEGATIVE MG/DL
LACTATE BLD-SCNC: 2.4 MMOL/L (ref 0.5–2)
LEUKOCYTE ESTERASE UR QL STRIP.AUTO: ABNORMAL
LYMPHOCYTES # BLD AUTO: 1.36 K/UL (ref 1–4.8)
LYMPHOCYTES NFR BLD: 20.9 % (ref 22–41)
MAGNESIUM SERPL-MCNC: 2.5 MG/DL (ref 1.5–2.5)
MCH RBC QN AUTO: 32.5 PG (ref 27–33)
MCHC RBC AUTO-ENTMCNC: 35 G/DL (ref 33.7–35.3)
MCV RBC AUTO: 92.8 FL (ref 81.4–97.8)
MICRO URNS: ABNORMAL
MONOCYTES # BLD AUTO: 0.82 K/UL (ref 0–0.85)
MONOCYTES NFR BLD AUTO: 12.6 % (ref 0–13.4)
MUCOUS THREADS #/AREA URNS HPF: ABNORMAL /HPF
NEUTROPHILS # BLD AUTO: 3.95 K/UL (ref 1.82–7.42)
NEUTROPHILS NFR BLD: 60.6 % (ref 44–72)
NITRITE UR QL STRIP.AUTO: NEGATIVE
NRBC # BLD AUTO: 0 K/UL
NRBC BLD-RTO: 0 /100 WBC
PH UR STRIP.AUTO: 6.5 [PH] (ref 5–8)
PLATELET # BLD AUTO: 249 K/UL (ref 164–446)
PMV BLD AUTO: 9.8 FL (ref 9–12.9)
POTASSIUM SERPL-SCNC: 4.2 MMOL/L (ref 3.6–5.5)
PROT SERPL-MCNC: 7.3 G/DL (ref 6–8.2)
PROT UR QL STRIP: NEGATIVE MG/DL
RBC # BLD AUTO: 4.83 M/UL (ref 4.7–6.1)
RBC # URNS HPF: ABNORMAL /HPF
RBC UR QL AUTO: ABNORMAL
SIGNIFICANT IND 70042: NORMAL
SIGNIFICANT IND 70042: NORMAL
SITE SITE: NORMAL
SITE SITE: NORMAL
SODIUM SERPL-SCNC: 140 MMOL/L (ref 135–145)
SOURCE SOURCE: NORMAL
SOURCE SOURCE: NORMAL
SP GR UR STRIP.AUTO: 1.02
TROPONIN T SERPL-MCNC: 17 NG/L (ref 6–19)
TSH SERPL DL<=0.005 MIU/L-ACNC: 0.95 UIU/ML (ref 0.38–5.33)
UNIDENT CRYS URNS QL MICRO: ABNORMAL /HPF
WBC # BLD AUTO: 6.5 K/UL (ref 4.8–10.8)
WBC #/AREA URNS HPF: ABNORMAL /HPF

## 2019-08-29 PROCEDURE — 99284 EMERGENCY DEPT VISIT MOD MDM: CPT

## 2019-08-29 PROCEDURE — 87086 URINE CULTURE/COLONY COUNT: CPT

## 2019-08-29 PROCEDURE — 84484 ASSAY OF TROPONIN QUANT: CPT

## 2019-08-29 PROCEDURE — 83605 ASSAY OF LACTIC ACID: CPT

## 2019-08-29 PROCEDURE — 81001 URINALYSIS AUTO W/SCOPE: CPT

## 2019-08-29 PROCEDURE — 36415 COLL VENOUS BLD VENIPUNCTURE: CPT

## 2019-08-29 PROCEDURE — 85025 COMPLETE CBC W/AUTO DIFF WBC: CPT

## 2019-08-29 PROCEDURE — 84443 ASSAY THYROID STIM HORMONE: CPT

## 2019-08-29 PROCEDURE — 87040 BLOOD CULTURE FOR BACTERIA: CPT

## 2019-08-29 PROCEDURE — 80053 COMPREHEN METABOLIC PANEL: CPT

## 2019-08-29 PROCEDURE — 83735 ASSAY OF MAGNESIUM: CPT

## 2019-08-29 PROCEDURE — 93005 ELECTROCARDIOGRAM TRACING: CPT | Performed by: EMERGENCY MEDICINE

## 2019-08-29 RX ORDER — CEFDINIR 300 MG/1
300 CAPSULE ORAL 2 TIMES DAILY
Status: SHIPPED | COMMUNITY
Start: 2019-08-26 | End: 2021-04-24

## 2019-08-29 RX ORDER — NIACINAMIDE 500 MG
500 TABLET ORAL EVERY MORNING
COMMUNITY

## 2019-08-29 NOTE — ED NOTES
Sitting on the side of the bed, he states his meds make his feet hurt. Updated on the plan, still waiting for results. He feels anxious and wants to go home.

## 2019-08-29 NOTE — ED TRIAGE NOTES
Discharged from hospital on 8-25 for UTI  Today has weakness and dizziness  Unable to get in to doctor for follow up  Last day of antibiotics is tomorrow

## 2019-08-29 NOTE — ED NOTES
Med Rec updated and complete per pt at bedside  Allergies have been verified and updated  Pt home pharmacy:[facility]        Pt reports that he is on  a 5 day course of OMNICEF that was started on 08/26/2019

## 2019-08-29 NOTE — ED PROVIDER NOTES
"ED Provider Note    CHIEF COMPLAINT  Chief Complaint   Patient presents with   • Weakness     patient discharged from hospital  for UTI       HPI  Laci Baugh is a 74 y.o. male who presents to the emergency department with generalized weakness.  Patient has a history of prostate surgery in .  He developed a UTI after this and was treated with antibiotics.  He is been feeling generally weak since the surgery.  His symptoms seem to worsen and he came to the hospital last week.  He was admitted for 1 day and treated for UTI.  Discharged on Omnicef that he is currently taking.  He reports that he just feels tired all the time and needs to sleep.  He denies any focal weakness or numbness.  No nausea vomiting.  No abdominal pain.  No fever chills.  No pain with urination or retention.  Denies chest pain shortness of breath cough headache.    REVIEW OF SYSTEMS  As per HPI, otherwise a 10 point review of systems is negative    PAST MEDICAL HISTORY  Past Medical History:   Diagnosis Date   • Bowel habit changes     constipation   • Cataract     removed bilat   • Cold 2018   • Diabetes (HCC) 2018    \"PRE\", on oral meds   • Environmental and seasonal allergies    • Heart burn    • Heart valve disease        • Hiatus hernia syndrome    • High cholesterol    • History of BPH    • Indigestion    • Skin cancer     basal cell carcinoma & squamous cell carcinoma   • Snoring     no sleep study   • TIA (transient ischemic attack)     reports //       SOCIAL HISTORY  Social History     Tobacco Use   • Smoking status: Former Smoker     Packs/day: 1.00     Years: 6.00     Pack years: 6.00     Types: Cigarettes     Last attempt to quit: 1966     Years since quittin.6   • Smokeless tobacco: Never Used   Substance Use Topics   • Alcohol use: Yes     Alcohol/week: 0.0 oz     Comment: 1 glass wine/night   • Drug use: No       SURGICAL HISTORY  Past Surgical History:   Procedure Laterality Date " "  • ECTROPIAN REPAIR Bilateral 1/16/2019    Procedure: ECTROPIAN REPAIR- LOWER LID WITH LATERAL TARSAL STRIPS AND MIDFACE ADVANCEMENT FLAPS;  Surgeon: Ethan Goldstein M.D.;  Location: SURGERY SAME DAY [facility];  Service: Ophthalmology   • OTHER SURGICAL PROCEDURE  1985/2014    skin cancer removal       CURRENT MEDICATIONS  Home Medications     Reviewed by Tarsha Merrill (Pharmacy Tech) on 08/29/19 at 0849  Med List Status: Complete   Medication Last Dose Status   apixaban (ELIQUIS) 5mg Tab 8/29/2019 Active   atorvastatin (LIPITOR) 40 MG Tab 8/28/2019 Active   B Complex Vitamins (B COMPLEX PO) 8/29/2019 Active   cefdinir (OMNICEF) 300 MG Cap 8/29/2019 Active   Cholecalciferol (VITAMIN D3) 5000 units Cap 8/29/2019 Active   Coenzyme Q10 300 MG Cap 8/29/2019 Active   dutasteride (AVODART) 0.5 MG capsule 8/29/2019 Active   fluticasone (FLONASE) 50 MCG/ACT nasal spray 8/29/2019 Active   Levocetirizine Dihydrochloride 5 MG Tab 8/29/2019 Active   metformin ER (GLUCOPHAGE XR) 750 MG TABLET SR 24 HR 8/29/2019 Active   metoprolol SR (TOPROL XL) 100 MG TABLET SR 24 HR 8/29/2019 Active   niacinamide 500 MG tablet 8/29/2019 Active   olopatadine (PATANOL) 0.1 % ophthalmic solution 8/29/2019 Active   polyethylene glycol/lytes (MIRALAX) Pack 8/29/2019 Active   ranitidine (ZANTAC) 300 MG tablet 8/29/2019 Active                ALLERGIES  Allergies   Allergen Reactions   • Other Food Cough     Reports multiple food related allergies.       PHYSICAL EXAM  VITAL SIGNS: BP (!) 94/56   Pulse 87   Temp 36.1 °C (97 °F) (Temporal)   Resp (!) 45   Ht 1.753 m (5' 9\")   Wt 64.8 kg (142 lb 13.7 oz)   SpO2 99%   BMI 21.10 kg/m²    Constitutional: Awake and alert.  Elderly tired appearing male  HENT:  Atraumatic, Normocephalic.Oropharynx moist mucus membranes, Nose normal inspection.   Eyes: Normal inspection  Neck: Supple  Cardiovascular: Normal heart rate, Normal rhythm.  Symmetric peripheral pulses.   Thorax & Lungs: No " respiratory distress, No wheezing, No rales, No rhonchi, No chest tenderness.   Abdomen: Bowel sounds normal, soft, non-distended, nontender, no mass  Skin: Pale  Back: No tenderness, No CVA tenderness.   Extremities: No clubbing, cyanosis, edema, no Homans or cords   Neurologic: Grossly normal   Psychiatric: Anxious appearing      Labs:  Results for orders placed or performed during the hospital encounter of 08/29/19   CBC WITH DIFFERENTIAL   Result Value Ref Range    WBC 6.5 4.8 - 10.8 K/uL    RBC 4.83 4.70 - 6.10 M/uL    Hemoglobin 15.7 14.0 - 18.0 g/dL    Hematocrit 44.8 42.0 - 52.0 %    MCV 92.8 81.4 - 97.8 fL    MCH 32.5 27.0 - 33.0 pg    MCHC 35.0 33.7 - 35.3 g/dL    RDW 43.5 35.9 - 50.0 fL    Platelet Count 249 164 - 446 K/uL    MPV 9.8 9.0 - 12.9 fL    Neutrophils-Polys 60.60 44.00 - 72.00 %    Lymphocytes 20.90 (L) 22.00 - 41.00 %    Monocytes 12.60 0.00 - 13.40 %    Eosinophils 4.60 0.00 - 6.90 %    Basophils 0.50 0.00 - 1.80 %    Immature Granulocytes 0.80 0.00 - 0.90 %    Nucleated RBC 0.00 /100 WBC    Neutrophils (Absolute) 3.95 1.82 - 7.42 K/uL    Lymphs (Absolute) 1.36 1.00 - 4.80 K/uL    Monos (Absolute) 0.82 0.00 - 0.85 K/uL    Eos (Absolute) 0.30 0.00 - 0.51 K/uL    Baso (Absolute) 0.03 0.00 - 0.12 K/uL    Immature Granulocytes (abs) 0.05 0.00 - 0.11 K/uL    NRBC (Absolute) 0.00 K/uL   COMP METABOLIC PANEL   Result Value Ref Range    Sodium 140 135 - 145 mmol/L    Potassium 4.2 3.6 - 5.5 mmol/L    Chloride 104 96 - 112 mmol/L    Co2 24 20 - 33 mmol/L    Anion Gap 12.0 (H) 0.0 - 11.9    Glucose 133 (H) 65 - 99 mg/dL    Bun 15 8 - 22 mg/dL    Creatinine 0.58 0.50 - 1.40 mg/dL    Calcium 9.6 8.4 - 10.2 mg/dL    AST(SGOT) 72 (H) 12 - 45 U/L    ALT(SGPT) 71 (H) 2 - 50 U/L    Alkaline Phosphatase 95 30 - 99 U/L    Total Bilirubin 0.7 0.1 - 1.5 mg/dL    Albumin 3.9 3.2 - 4.9 g/dL    Total Protein 7.3 6.0 - 8.2 g/dL    Globulin 3.4 1.9 - 3.5 g/dL    A-G Ratio 1.1 g/dL   URINALYSIS CULTURE, IF INDICATED    Result Value Ref Range    Color Yellow     Character Clear     Specific Gravity 1.020 <1.035    Ph 6.5 5.0 - 8.0    Glucose Negative Negative mg/dL    Ketones Negative Negative mg/dL    Protein Negative Negative mg/dL    Bilirubin Negative Negative    Nitrite Negative Negative    Leukocyte Esterase Small (A) Negative    Occult Blood Trace (A) Negative    Micro Urine Req Microscopic    LACTIC ACID   Result Value Ref Range    Lactic Acid 2.4 (H) 0.5 - 2.0 mmol/L   MAGNESIUM   Result Value Ref Range    Magnesium 2.5 1.5 - 2.5 mg/dL   TSH   Result Value Ref Range    TSH 0.949 0.380 - 5.330 uIU/mL   TROPONIN   Result Value Ref Range    Troponin T 17 6 - 19 ng/L   ESTIMATED GFR   Result Value Ref Range    GFR If African American >60 >60 mL/min/1.73 m 2    GFR If Non African American >60 >60 mL/min/1.73 m 2   URINE MICROSCOPIC (W/UA)   Result Value Ref Range    WBC 5-10 (A) /hpf    RBC 2-5 (A) /hpf    Bacteria Rare (A) None /hpf    Mucous Threads Moderate /hpf    Urine Crystals Few Amorphous /hpf   EKG   Result Value Ref Range    Report       [facility] Emergency Dept.    Test Date:  2019  Pt Name:    EDWIN NICK                  Department: [facility]  MRN:        [id number]                      [facility]  Gender:     Male                         Technician: [id number]  :        1945                   Requested By:HOLLY FLORES  Order #:    [id number]                    Reading MD: HOLLY FLORES MD    Measurements  Intervals                                Axis  Rate:       65                           P:          24  CT:         228                          QRS:        -21  QRSD:       88                           T:          24  QT:         412  QTc:        429    Interpretive Statements  SINUS RHYTHM  FIRST DEGREE AV BLOCK  BORDERLINE LEFT AXIS DEVIATION  RSR' IN V1 OR V2, RIGHT VCD OR RVH  Compared to ECG 2019 09:04:46  First degree AV block now present  Sinus  arrhythmia no longer present    Electronically Signed On 8- 10:52:50 PDT by HOLLY FLORES MD         COURSE & MEDICAL DECISION MAKING  Patient presents with generalized weakness and fatigue.  He is clinically nontoxic.  His vital signs are stable.  He is afebrile.  He does not have any other specific findings on his examination.  Broad work-up was undertaken.  EKG does not show ischemia.  Troponin is negative.  No remarkable electrolyte disturbance.  He does have slight elevation of his AST and ALT of unclear significance.  He does not have any leukocytosis or left shift.  Urinalysis is questionable for infection.  He is currently on antibiotics.  I will continue the current antibiotic and send a urine culture.  The lactic acid was slightly elevated but I suspect this is related to tourniquet time.  When he stood up his blood pressure was low, but upon recheck it improved.  He did not have any symptoms and was not lightheaded.  At this point of advised him to push fluids.  He will need to be rechecked by his primary doctor within 1 week.  We will follow-up on urine culture.  I precaution him to return to the ER for any specific symptoms including abdominal pain, fever, focal weakness or numbness, chest pain, shortness of breath.    FINAL IMPRESSION  1.  Weakness      This dictation was created using voice recognition software. The accuracy of the dictation is limited to the abilities of the software.  The nursing notes were reviewed and certain aspects of this information were incorporated into this note.      Electronically signed by: Holly Flores, 8/29/2019 1:16 PM

## 2019-08-31 LAB
BACTERIA UR CULT: NORMAL
SIGNIFICANT IND 70042: NORMAL
SITE SITE: NORMAL
SOURCE SOURCE: NORMAL

## 2019-09-03 LAB
BACTERIA BLD CULT: NORMAL
BACTERIA BLD CULT: NORMAL
SIGNIFICANT IND 70042: NORMAL
SIGNIFICANT IND 70042: NORMAL
SITE SITE: NORMAL
SITE SITE: NORMAL
SOURCE SOURCE: NORMAL
SOURCE SOURCE: NORMAL

## 2019-09-04 NOTE — DOCUMENTATION QUERY
"                                                                         [facility]                                                                       Query Response Note      PATIENT:               EDWIN NICK  ACCT #:                  [id number]  MRN:                     [id number]  :                      1945  ADMIT DATE:       2019 8:03 AM  DISCH DATE:        2019 2:16 PM  RESPONDING  PROVIDER #:        [id number]           QUERY TEXT:    \"Additionally appears with early sepsis\" is documented per ED note. Sepsis is not further documented in the record. Please clarify status of this condition:    NOTE:  If an appropriate response is not listed below, please respond with a new note.       The patient's Clinical Indicators include:   ED Note:   VITAL SIGNS: /82   Pulse (!) 102   Temp 37.1 °C (98.7 °F) (Temporal)   Resp (!) 21   Ht 1.753 m (5' 9\")   Wt 64 kg (141 lb 1.5 oz)   SpO2 91%    LABS WBC 11.4, lactic acid 3.2    He is currently being treated for urinary tract infection and at this point appears to have persistent infection that is failed his outpatient antibiotics, Macrobid. Additionally appears with early sepsis with his leukocytosis and tachycardia and lactic acidosis.  He has been started on fluid resuscitation, IV antibiotics, Rocephin and admitted for further care.  He has had no hypotension to mandate large fluid resuscitation or pressors.    H&P   Complicated UTI (urinary tract infection)  Assessment & Plan  Due to recent procedure for enlarged prostate on  with Dr. Guerra in [address]  Patient had indwelling ha for two weeks and then did straight catheterization until two days ago  Rocephin for infection, culture ordered    Treatment: fluid resuscitation, IV antibiotics  Risk factors: Complicated UTI with hematuria due to recent procedure  Options provided:   -- Sepsis is ruled in   -- Sepsis is ruled out   -- Unable to determine      Query created " by: Zoraida Santiago on 9/3/2019 3:04 PM    RESPONSE TEXT:    Sepsis is ruled out          Electronically signed by:  DERRICK LAINEZ 9/4/2019 3:19 PM

## 2019-10-27 ENCOUNTER — APPOINTMENT (OUTPATIENT)
Dept: RADIOLOGY | Facility: MEDICAL CENTER | Age: 74
End: 2019-10-27
Attending: EMERGENCY MEDICINE
Payer: COMMERCIAL

## 2019-10-27 ENCOUNTER — HOSPITAL ENCOUNTER (EMERGENCY)
Facility: MEDICAL CENTER | Age: 74
End: 2019-10-27
Attending: EMERGENCY MEDICINE
Payer: COMMERCIAL

## 2019-10-27 VITALS
TEMPERATURE: 97.1 F | WEIGHT: 145.5 LBS | HEART RATE: 69 BPM | RESPIRATION RATE: 28 BRPM | DIASTOLIC BLOOD PRESSURE: 63 MMHG | HEIGHT: 69 IN | OXYGEN SATURATION: 93 % | BODY MASS INDEX: 21.55 KG/M2 | SYSTOLIC BLOOD PRESSURE: 112 MMHG

## 2019-10-27 DIAGNOSIS — T18.128A FOOD IMPACTION OF ESOPHAGUS, INITIAL ENCOUNTER: ICD-10-CM
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
## 2019-10-27 LAB
ALBUMIN SERPL BCP-MCNC: 4.6 G/DL (ref 3.2–4.9)
ALBUMIN/GLOB SERPL: 1.3 G/DL
ALP SERPL-CCNC: 114 U/L (ref 30–99)
ALT SERPL-CCNC: 30 U/L (ref 2–50)
ANION GAP SERPL CALC-SCNC: 17 MMOL/L (ref 0–11.9)
AST SERPL-CCNC: 33 U/L (ref 12–45)
BASOPHILS # BLD AUTO: 0.2 % (ref 0–1.8)
BASOPHILS # BLD: 0.02 K/UL (ref 0–0.12)
BILIRUB SERPL-MCNC: 0.8 MG/DL (ref 0.1–1.5)
BUN SERPL-MCNC: 26 MG/DL (ref 8–22)
CALCIUM SERPL-MCNC: 9.8 MG/DL (ref 8.4–10.2)
CHLORIDE SERPL-SCNC: 101 MMOL/L (ref 96–112)
CO2 SERPL-SCNC: 20 MMOL/L (ref 20–33)
CREAT SERPL-MCNC: 0.6 MG/DL (ref 0.5–1.4)
EOSINOPHIL # BLD AUTO: 0.25 K/UL (ref 0–0.51)
EOSINOPHIL NFR BLD: 2.5 % (ref 0–6.9)
ERYTHROCYTE [DISTWIDTH] IN BLOOD BY AUTOMATED COUNT: 49.3 FL (ref 35.9–50)
GLOBULIN SER CALC-MCNC: 3.6 G/DL (ref 1.9–3.5)
GLUCOSE SERPL-MCNC: 117 MG/DL (ref 65–99)
HCT VFR BLD AUTO: 48.1 % (ref 42–52)
HGB BLD-MCNC: 16.4 G/DL (ref 14–18)
IMM GRANULOCYTES # BLD AUTO: 0.05 K/UL (ref 0–0.11)
IMM GRANULOCYTES NFR BLD AUTO: 0.5 % (ref 0–0.9)
LIPASE SERPL-CCNC: 29 U/L (ref 7–58)
LYMPHOCYTES # BLD AUTO: 1.42 K/UL (ref 1–4.8)
LYMPHOCYTES NFR BLD: 14.2 % (ref 22–41)
MCH RBC QN AUTO: 32 PG (ref 27–33)
MCHC RBC AUTO-ENTMCNC: 34.1 G/DL (ref 33.7–35.3)
MCV RBC AUTO: 93.8 FL (ref 81.4–97.8)
MONOCYTES # BLD AUTO: 0.82 K/UL (ref 0–0.85)
MONOCYTES NFR BLD AUTO: 8.2 % (ref 0–13.4)
NEUTROPHILS # BLD AUTO: 7.42 K/UL (ref 1.82–7.42)
NEUTROPHILS NFR BLD: 74.4 % (ref 44–72)
NRBC # BLD AUTO: 0 K/UL
NRBC BLD-RTO: 0 /100 WBC
PLATELET # BLD AUTO: 192 K/UL (ref 164–446)
PMV BLD AUTO: 10 FL (ref 9–12.9)
POTASSIUM SERPL-SCNC: 4.1 MMOL/L (ref 3.6–5.5)
PROT SERPL-MCNC: 8.2 G/DL (ref 6–8.2)
RBC # BLD AUTO: 5.13 M/UL (ref 4.7–6.1)
SODIUM SERPL-SCNC: 138 MMOL/L (ref 135–145)
TROPONIN T SERPL-MCNC: 16 NG/L (ref 6–19)
WBC # BLD AUTO: 10 K/UL (ref 4.8–10.8)

## 2019-10-27 PROCEDURE — 80053 COMPREHEN METABOLIC PANEL: CPT

## 2019-10-27 PROCEDURE — 96375 TX/PRO/DX INJ NEW DRUG ADDON: CPT | Mod: XU

## 2019-10-27 PROCEDURE — 93005 ELECTROCARDIOGRAM TRACING: CPT | Performed by: EMERGENCY MEDICINE

## 2019-10-27 PROCEDURE — 160203 HCHG ENDO MINUTES - 1ST 30 MINS LEVEL 4: Performed by: INTERNAL MEDICINE

## 2019-10-27 PROCEDURE — 85025 COMPLETE CBC W/AUTO DIFF WBC: CPT

## 2019-10-27 PROCEDURE — 99285 EMERGENCY DEPT VISIT HI MDM: CPT

## 2019-10-27 PROCEDURE — 70360 X-RAY EXAM OF NECK: CPT

## 2019-10-27 PROCEDURE — 83690 ASSAY OF LIPASE: CPT

## 2019-10-27 PROCEDURE — 96374 THER/PROPH/DIAG INJ IV PUSH: CPT | Mod: XU

## 2019-10-27 PROCEDURE — 700105 HCHG RX REV CODE 258: Performed by: EMERGENCY MEDICINE

## 2019-10-27 PROCEDURE — 160048 HCHG OR STATISTICAL LEVEL 1-5: Performed by: INTERNAL MEDICINE

## 2019-10-27 PROCEDURE — 700111 HCHG RX REV CODE 636 W/ 250 OVERRIDE (IP): Performed by: EMERGENCY MEDICINE

## 2019-10-27 PROCEDURE — 94760 N-INVAS EAR/PLS OXIMETRY 1: CPT

## 2019-10-27 PROCEDURE — 71045 X-RAY EXAM CHEST 1 VIEW: CPT

## 2019-10-27 PROCEDURE — 84484 ASSAY OF TROPONIN QUANT: CPT

## 2019-10-27 RX ORDER — PROPOFOL 10 MG/ML
INJECTION, EMULSION INTRAVENOUS
Status: COMPLETED | OUTPATIENT
Start: 2019-10-27 | End: 2019-10-27

## 2019-10-27 RX ORDER — ONDANSETRON 2 MG/ML
4 INJECTION INTRAMUSCULAR; INTRAVENOUS ONCE
Status: COMPLETED | OUTPATIENT
Start: 2019-10-27 | End: 2019-10-27

## 2019-10-27 RX ORDER — SODIUM CHLORIDE 9 MG/ML
1000 INJECTION, SOLUTION INTRAVENOUS ONCE
Status: COMPLETED | OUTPATIENT
Start: 2019-10-27 | End: 2019-10-27

## 2019-10-27 RX ORDER — LORAZEPAM 2 MG/ML
0.5 INJECTION INTRAMUSCULAR ONCE
Status: COMPLETED | OUTPATIENT
Start: 2019-10-27 | End: 2019-10-27

## 2019-10-27 RX ADMIN — PROPOFOL 60 MG: 10 INJECTION, EMULSION INTRAVENOUS at 19:55

## 2019-10-27 RX ADMIN — SODIUM CHLORIDE 1000 ML: 9 INJECTION, SOLUTION INTRAVENOUS at 17:27

## 2019-10-27 RX ADMIN — SODIUM CHLORIDE 1000 ML: 900 INJECTION, SOLUTION INTRAVENOUS at 19:45

## 2019-10-27 RX ADMIN — ONDANSETRON 4 MG: 2 INJECTION INTRAMUSCULAR; INTRAVENOUS at 17:26

## 2019-10-27 RX ADMIN — LORAZEPAM 0.5 MG: 2 INJECTION INTRAMUSCULAR; INTRAVENOUS at 17:26

## 2019-10-27 RX ADMIN — PROPOFOL 60 MG: 10 INJECTION, EMULSION INTRAVENOUS at 19:58

## 2019-10-27 ASSESSMENT — ENCOUNTER SYMPTOMS
FLANK PAIN: 0
EYES NEGATIVE: 1
BLOOD IN STOOL: 0
CARDIOVASCULAR NEGATIVE: 1
EYE PAIN: 0
SORE THROAT: 0
MYALGIAS: 0
RESPIRATORY NEGATIVE: 1
SEIZURES: 0
CONSTITUTIONAL NEGATIVE: 1
SHORTNESS OF BREATH: 0
NECK PAIN: 0
FOCAL WEAKNESS: 0
BRUISES/BLEEDS EASILY: 0
ROS GI COMMENTS: DYSPHAGIA
FEVER: 0
HEADACHES: 0
WEAKNESS: 0
BACK PAIN: 0
HALLUCINATIONS: 0
ABDOMINAL PAIN: 0
SHORTNESS OF BREATH: 1
WEIGHT LOSS: 1

## 2019-10-27 NOTE — ED NOTES
Pt to ED with wife today for evaluation of gagging vomiting starting approx. 1300 today after eating chicken. Pt States he has had intermittent feeling of something stuck in his throat. Further assessment, pt has had problems with his throat in the past with increased symptoms in the past 2-3 months.

## 2019-10-27 NOTE — ED TRIAGE NOTES
"Presents complaining of episodes of N/V recurring since approximately 1300 today after lunch.  He consumed some chicken, and developed symptoms soon after.  No other \"guest\" complains of the same.  Chief Complaint   Patient presents with   • N/V     /82   Pulse 76   Temp 37.2 °C (98.9 °F) (Temporal)   Resp 18   Ht 1.753 m (5' 9\")   Wt 66 kg (145 lb 8.1 oz)   SpO2 96%   BMI 21.49 kg/m²     "

## 2019-10-28 ASSESSMENT — ENCOUNTER SYMPTOMS: VOMITING: 1

## 2019-10-28 NOTE — ED NOTES
Procedure time out with EDP Citlaly, resp, and GI dr with GI tech, ER tech, 1 RN, 1 medical student. All agree including pt in timeout process. Procedure start. MEds given by EDP. PT on all monitoring with emergency medical equipment close by

## 2019-10-28 NOTE — ED NOTES
PT and family greeted and aware off shift change. VSS . NAD noted. Remains on monitoring. Fall precautions in place. Call light in reach.

## 2019-10-28 NOTE — ED NOTES
VSS . NAD noted. Remains on monitoring. Fall precautions in place. Call light in reach.  PT awake looking at pics of esophagus

## 2019-10-28 NOTE — RESPIRATORY CARE
Conscious Sedation Respiratory Update       O2 (LPM): 2 (10/27/19 1945)  O2 Daily Delivery Respiratory : Nasal Cannula(split cannula to ETCO2 monitor) (10/27/19 1945)    Monitored all vital signs, ETCO2 21 before and after procedure. SAO2 95% before and after procedure.    VASYL Pena RRT

## 2019-10-28 NOTE — ED NOTES
PT awake and talking. RN remains with pt and family allowed back to bedside. Remains on all monitoring and 2L o2

## 2019-10-28 NOTE — ED NOTES
EDP to bedside. GI dr to bedside with tech for setup. Extra suction set up. Resp called for procedure. Pt placed on NC for o2 and 1 L NS hung for procedure VO Mclean EDP

## 2019-10-28 NOTE — CONSULTS
Gastroenterology Consultation    Date of Service  10/27/2019    Referring Physician  Lencho Boucher M.D.    Consulting Physician  Walt García M.D.    Reason for Consultation  Dysphagia    History of Presenting Illness  74 y.o. Male with atrial fibrillation on Eliquis, chronic dysphagia who presented 10/27/2019 with worsened dysphagia and suspected esophageal obstruction secondary to chicken.    He apparently has had chronic issues with intermittent dysphagia.  He has had prior EGDs and had last EGD at [facility] with Dr. BEAR in 2017.  He is unsure if he had esophageal dilation.  He was told to take ranitidine which he has been on for 2 years.  He does note several food allergies.  Do not have old records available for review.    He notes some increasing solid and pill dysphagia over past few months.  Then, earlier today, he was eating chicken at lunch.  Since then he has been unable to drink liquids as he vomits these up.  He still feels like there is something stuck high in his throat.    Review of Systems  Review of Systems   Constitutional: Positive for weight loss.   Respiratory: Positive for shortness of breath.    Gastrointestinal:        Dysphagia   All other systems reviewed and are negative.      Past Medical History   has a past medical history of Cataract, Diabetes (HCC) (12/27/2018), Environmental and seasonal allergies, GERD, Heart valve disease, Hiatus hernia syndrome, High cholesterol, History of BPH, Skin cancer, and TIA (transient ischemic attack).    Surgical History   has a past surgical history that includes other surgical procedure (1985/2014) and ectropian repair (Bilateral, 1/16/2019), TURP, prior EGD    Family History  Several cancers run in the family but not esophageal    Social History   reports that he quit smoking about 53 years ago. His smoking use included cigarettes. He has a 6.00 pack-year smoking history. He has never used smokeless tobacco. He reports that he drinks alcohol. He  reports that he does not use drugs.    Medications    Current Facility-Administered Medications:   •  propofol  •  propofol    Current Outpatient Medications:   •  niacinamide, 500 mg, Oral, QAM, 8/29/2019 at am  •  apixaban, 5 mg, Oral, BID, 8/29/2019 at am  •  cefdinir, 300 mg, Oral, BID, 8/29/2019 at am  •  vitamin D3, 5,000 Units, Oral, DAILY, 8/29/2019 at am  •  metoprolol SR, 100 mg, Oral, QAM, 8/29/2019 at am  •  ranitidine, 300 mg, Oral, DAILY, 8/29/2019 at am  •  metFORMIN ER, 750 mg, Oral, BID, 8/29/2019 at am  •  Levocetirizine Dihydrochloride, 5 mg, Oral, DAILY, 8/29/2019 at am  •  olopatadine, 1-2 Drop, Both Eyes, BID, 8/29/2019 at am  •  fluticasone, 1-2 Spray, Nasal, BID, 8/29/2019 at am  •  dutasteride, 0.5 mg, Oral, QAM, 8/29/2019 at am  •  polyethylene glycol/lytes, 17 g, Oral, DAILY, 8/29/2019 at am  •  Coenzyme Q10, 300 mg, Oral, DAILY, 8/29/2019 at am  •  atorvastatin, 40 mg, Oral, Q EVENING, 8/28/2019 at pm  •  B Complex Vitamins (B COMPLEX PO), 1 Tab, Oral, DAILY, 8/29/2019 at am      Allergies  Allergies   Allergen Reactions   • Other Food Cough     Reports multiple food related allergies.       Physical Exam  Temp:  [37.2 °C (98.9 °F)] 37.2 °C (98.9 °F)  Pulse:  [71-79] 71  Resp:  [18-22] 22  BP: (113-140)/(62-82) 113/62  SpO2:  [91 %-96 %] 95 %    Physical Exam   Constitutional: He is oriented to person, place, and time. He appears well-developed and well-nourished. No distress.   HENT:   Head: Normocephalic and atraumatic.   Mouth/Throat: No oropharyngeal exudate.   Eyes: Pupils are equal, round, and reactive to light. Conjunctivae and EOM are normal. No scleral icterus.   Neck: Normal range of motion. Neck supple. No JVD present. No tracheal deviation present. No thyromegaly present.   Cardiovascular: Normal rate and regular rhythm.   No murmur heard.  Pulmonary/Chest: Effort normal and breath sounds normal. No respiratory distress. He has no wheezes. He has no rales. He exhibits no  tenderness.   Abdominal: Soft. Bowel sounds are normal. He exhibits no distension and no mass. There is no tenderness. There is no rebound and no guarding.   Musculoskeletal: He exhibits no edema.   Neurological: He is alert and oriented to person, place, and time.   Skin: Skin is warm and dry.   Psychiatric:   Flat affect   Nursing note and vitals reviewed.      Fluids  Date 10/27/19 0700 - 10/28/19 0659   Shift 3415-0652 8390-8827 4272-8867 24 Hour Total   INTAKE   I.V.  1000  1000   Shift Total  1000  1000   OUTPUT   Shift Total       Weight (kg)  66 66 66       Laboratory  Recent Labs     10/27/19  1645   WBC 10.0   RBC 5.13   HEMOGLOBIN 16.4   HEMATOCRIT 48.1   MCV 93.8   MCH 32.0   MCHC 34.1   RDW 49.3   PLATELETCT 192   MPV 10.0     Recent Labs     10/27/19  1645   SODIUM 138   POTASSIUM 4.1   CHLORIDE 101   CO2 20   GLUCOSE 117*   BUN 26*   CREATININE 0.60   CALCIUM 9.8                     Imaging  DX-NECK FOR SOFT TISSUE   Final Result      1.  No radiopaque foreign body is identified. Evaluation is somewhat limited secondary to overlying calcifications, possibly within the carotid arteries.      2.  Severe degenerative disc disease and facet arthropathy.      DX-CHEST-PORTABLE (1 VIEW)   Final Result      No acute cardiopulmonary findings.            Assessment/Plan  75 y/o with chronic dysphagia, seasonal and food allergies that presented for more acute dysphagia and suspected esophageal obstruction secondary to chicken earlier today.  Question possible eosinophilic esophagitis or esophageal stricture/ring and less likely esophageal neoplasia.  Plan for EGD in ED for removal of meat impaction.    PROBLEMS:  1. Esophageal obstruction secondary to meat  2. Chronic dysphagia  3. GERD  4. Seasonal and food allergies  5. Atrial fibrillation on Eliquis  6. BPH    PLAN:  1. EGD in ED for removal of meat

## 2019-10-28 NOTE — ED NOTES
Pt and visitor wanting to leave. IV DC with cath intact. Bleeding controled. Appropriate bandage applied. PT given DC instructions and states understanding. Denies need for assistance. Stable at DC. NAD noted. Ambulatory with ease.

## 2019-10-28 NOTE — ED PROVIDER NOTES
"ED Provider Note    CHIEF COMPLAINT  Chief Complaint   Patient presents with   • N/V       HPI  HPI     74 y.o. M p/w CC of sensation of something stuck in throat.    Feels like I have had more difficulty with swallowing.   For lunch he had premarinated chicken.   After 10-15 min he felt like something was stuck in throat.     Feels like something obstructing throat.  Unable to take home medications.   Numerous episodes of vomiting lunch contents and spit since initial incident.     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  Patient denies shortness of breath.  Patient denies diarrhea.  Patient denies any recent antibiotics.      REVIEW OF SYSTEMS  Review of Systems   Constitutional: Negative.  Negative for fever.   HENT: Negative.  Negative for ear pain and sore throat.    Eyes: Negative.  Negative for pain.   Respiratory: Negative.  Negative for shortness of breath.    Cardiovascular: Negative.  Negative for chest pain.   Gastrointestinal: Positive for vomiting. Negative for abdominal pain and blood in stool.   Genitourinary: Negative for dysuria and flank pain.   Musculoskeletal: Negative for back pain, myalgias and neck pain.   Skin: Negative.  Negative for rash.   Neurological: Negative for focal weakness, seizures, weakness and headaches.   Endo/Heme/Allergies: Does not bruise/bleed easily.   Psychiatric/Behavioral: Negative for hallucinations and suicidal ideas.   All other systems reviewed and are negative.      PAST MEDICAL HISTORY   has a past medical history of Bowel habit changes, Cataract, Cold (12/2018), Diabetes (HCC) (12/27/2018), Environmental and seasonal allergies, Heart burn, Heart valve disease, Hiatus hernia syndrome, High cholesterol, History of BPH, Indigestion, Skin cancer, Snoring, and TIA (transient ischemic attack).    SOCIAL HISTORY  Social History     Tobacco " "Use   • Smoking status: Former Smoker     Packs/day: 1.00     Years: 6.00     Pack years: 6.00     Types: Cigarettes     Last attempt to quit: 1966     Years since quittin.8   • Smokeless tobacco: Never Used   Substance and Sexual Activity   • Alcohol use: Yes     Alcohol/week: 0.0 oz     Comment: 1 glass wine/night   • Drug use: No   • Sexual activity: Not on file       SURGICAL HISTORY   has a past surgical history that includes other surgical procedure () and ectropian repair (Bilateral, 2019).    CURRENT MEDICATIONS  Home Medications    **Home medications have not yet been reviewed for this encounter**         ALLERGIES  Allergies   Allergen Reactions   • Other Food Cough     Reports multiple food related allergies.       PHYSICAL EXAM  VITAL SIGNS: /63   Pulse 69   Temp 36.2 °C (97.1 °F) (Temporal)   Resp (!) 28   Ht 1.753 m (5' 9\")   Wt 66 kg (145 lb 8.1 oz)   SpO2 93%   BMI 21.49 kg/m²  @ELDON[[id number]::@  Pulse ox interpretation: I interpret this pulse ox as normal.    Physical Exam   Constitutional: He is oriented to person, place, and time and well-developed, well-nourished, and in no distress.   HENT:   Head: Normocephalic.   Right Ear: External ear normal.   Left Ear: External ear normal.   Mouth/Throat: No oropharyngeal exudate.   Eyes: Pupils are equal, round, and reactive to light. EOM are normal. No scleral icterus.   Neck: Normal range of motion.   Cardiovascular: Normal rate.   Pulmonary/Chest: Effort normal. No respiratory distress.   Abdominal: He exhibits no distension. There is no tenderness.   Musculoskeletal: Normal range of motion. He exhibits no deformity.   Neurological: He is alert and oriented to person, place, and time. Coordination normal.   Skin: Skin is warm and dry. No rash noted. No erythema.   Psychiatric: Affect and judgment normal.   Nursing note and vitals reviewed.      DIAGNOSTIC STUDIES / PROCEDURES    LABS/EKG  Results for orders placed or " performed during the hospital encounter of 10/27/19   CBC WITH DIFFERENTIAL   Result Value Ref Range    WBC 10.0 4.8 - 10.8 K/uL    RBC 5.13 4.70 - 6.10 M/uL    Hemoglobin 16.4 14.0 - 18.0 g/dL    Hematocrit 48.1 42.0 - 52.0 %    MCV 93.8 81.4 - 97.8 fL    MCH 32.0 27.0 - 33.0 pg    MCHC 34.1 33.7 - 35.3 g/dL    RDW 49.3 35.9 - 50.0 fL    Platelet Count 192 164 - 446 K/uL    MPV 10.0 9.0 - 12.9 fL    Neutrophils-Polys 74.40 (H) 44.00 - 72.00 %    Lymphocytes 14.20 (L) 22.00 - 41.00 %    Monocytes 8.20 0.00 - 13.40 %    Eosinophils 2.50 0.00 - 6.90 %    Basophils 0.20 0.00 - 1.80 %    Immature Granulocytes 0.50 0.00 - 0.90 %    Nucleated RBC 0.00 /100 WBC    Neutrophils (Absolute) 7.42 1.82 - 7.42 K/uL    Lymphs (Absolute) 1.42 1.00 - 4.80 K/uL    Monos (Absolute) 0.82 0.00 - 0.85 K/uL    Eos (Absolute) 0.25 0.00 - 0.51 K/uL    Baso (Absolute) 0.02 0.00 - 0.12 K/uL    Immature Granulocytes (abs) 0.05 0.00 - 0.11 K/uL    NRBC (Absolute) 0.00 K/uL   COMP METABOLIC PANEL   Result Value Ref Range    Sodium 138 135 - 145 mmol/L    Potassium 4.1 3.6 - 5.5 mmol/L    Chloride 101 96 - 112 mmol/L    Co2 20 20 - 33 mmol/L    Anion Gap 17.0 (H) 0.0 - 11.9    Glucose 117 (H) 65 - 99 mg/dL    Bun 26 (H) 8 - 22 mg/dL    Creatinine 0.60 0.50 - 1.40 mg/dL    Calcium 9.8 8.4 - 10.2 mg/dL    AST(SGOT) 33 12 - 45 U/L    ALT(SGPT) 30 2 - 50 U/L    Alkaline Phosphatase 114 (H) 30 - 99 U/L    Total Bilirubin 0.8 0.1 - 1.5 mg/dL    Albumin 4.6 3.2 - 4.9 g/dL    Total Protein 8.2 6.0 - 8.2 g/dL    Globulin 3.6 (H) 1.9 - 3.5 g/dL    A-G Ratio 1.3 g/dL   LIPASE   Result Value Ref Range    Lipase 29 7 - 58 U/L   TROPONIN   Result Value Ref Range    Troponin T 16 6 - 19 ng/L   ESTIMATED GFR   Result Value Ref Range    GFR If African American >60 >60 mL/min/1.73 m 2    GFR If Non African American >60 >60 mL/min/1.73 m 2   EKG (NOW)   Result Value Ref Range    Report       [facility] Emergency Dept.    Test Date:   2019-10-27  Pt Name:    EDWIN NICK                  Department: [facility]  MRN:        [id number]                      [facility]  Gender:     Male                         Technician: GT  :        [id number]                   Requested By:NIRMAL SALINAS  Order #:    [id number]                    Reading MD:    Measurements  Intervals                                Axis  Rate:       70                           P:          0  MT:         220                          QRS:        -8  QRSD:       115                          T:          8  QT:         388  QTc:        419    Interpretive Statements  Sinus rhythm  Ventricular premature complex  Prolonged MT interval  Consider left atrial enlargement  Nonspecific intraventricular conduction delay  Borderline T abnormalities, anterior leads  Compared to ECG 2019 09:44:02  Ventricular premature complex(es) now present  Intraventricular conduction delay now present  T -wave abnormality now present  Right ventricular hypertrophy no longer present       12 Lead EKG interpreted by me to show:  sinus rhythm  Rate 70  Axis: Normal  Intervals: IVCD  My impression of this EKG: No STEMI. IVCD No significant change from prior.    RADIOLOGY  DX-NECK FOR SOFT TISSUE   Final Result      1.  No radiopaque foreign body is identified. Evaluation is somewhat limited secondary to overlying calcifications, possibly within the carotid arteries.      2.  Severe degenerative disc disease and facet arthropathy.      DX-CHEST-PORTABLE (1 VIEW)   Final Result      No acute cardiopulmonary findings.             Procedural Sedation Procedure Note    Indication: Esophageal foreign body    Consent: I have discussed with the patient and/or the patient representative the indication, alternatives, and the possible risks and/or complications of the planned procedure and the anesthesia methods. The patient and/or patient representative appear to understand and agree to proceed.    Pre-Sedation  Documentation and Exam: I have personally completed a history, physical exam & review of systems for this patient (see notes).  Vital signs have been reviewed (see flow sheet for vitals).  I have reviewed the patient's history and review of systems.  Airway Assessment: normal    Prior History of Anesthesia Complications: none    ASA Classification: Class 3 - A patient with severe systemic disease that limits activity but is not incapacitating    Sedation/ Anesthesia Plan: intravenous sedation    Medications Used: propofol 60mg intravenously.  40 mg, followed by 20 mg 3 min after initial dose    Monitoring and Safety: The patient was placed on a cardiac monitor and vital signs, pulse oximetry and level of consciousness were continuously evaluated throughout the procedure. The patient was closely monitored until recovery from the medications was complete and the patient had returned to baseline status. Respiratory therapy was on standby at all times during the procedure.    (The following sections must be completed)  Post-Sedation Vital Signs: Vital signs were reviewed and were stable after the procedure (see flow sheet for vitals)            Post-Sedation Exam: Lungs: clear, heart exam: no tachycardia.           HYDRATION: Based on the patient's presentation of Acute Vomiting the patient was given IV fluids. IV Hydration was used because oral hydration was not as rapid as required. Upon recheck following hydration, the patient was improved sx.     COURSE & MEDICAL DECISION MAKING  Pertinent Labs & Imaging studies reviewed by me. (See chart for details)    74 y.o. male PMH chronic esophageal issues p/w foreign body sensation and vomiting.     Differential diagnosis includes but is not limited to:  Unclear if patient is having globus sensation vs food impaction  Pt with persistent sensation of food impaction  I discussed this case with on-call GI physician Dr. García who came to patient's bedside for emergent  endoscopy  Endoscopy revealed esophageal food impaction and progression of food impaction to stomach  See procedure note  Patient tolerated procedure well and was observed in emergency department for up to 45 minutes after procedure.  Patient tolerating p.o. prior to discharge.  Patient with significant improvement in symptoms prior to discharge.  Patient agrees to follow-up with GI.  I discussed obtaining follow-up imaging study of patient's neck and esophagus to rule out oncologic process and he and his wife both agreed to discuss this with his primary care physician within the next couple weeks.  Plan follow-up with GI physician for potential esophageal dilatation.      FINAL IMPRESSION  Visit Diagnoses     ICD-10-CM   1. Food impaction of esophagus, initial encounter T18.128A              Electronically signed by: Lencho Boucher, 10/27/2019 5:01 PM

## 2019-10-28 NOTE — PROCEDURES
DATE OF SERVICE:  10/27/2019    GI PROCEDURE NOTE    PROCEDURE:  Upper endoscopy.    :  Walt García MD    INDICATION:  The patient is a 74-year-old gentleman with history of chronic   dysphagia, seasonal and food allergies who presented for evaluation of acute   dysphagia after eating chicken earlier today.  He is unable to tolerate   secretions.    INFORMED CONSENT:  Written informed consent was obtained from the patient   after thorough explanation of indications, benefits and risks of the   procedure, which included but was not limited to bleeding, infection,   perforation, adverse reaction to medications and missed lesions.    MEDICATIONS GIVEN:  Monitored anesthesia care with propofol and a total of 60   mg by the emergency department physician.    Continuous telemetry, BP, pulse oximetry, and capnography were performed   throughout the procedure.    INSTRUMENT:  A midsize flexible upper endoscope was used for the procedure.    FINDINGS:  Patient was placed in the left lateral decubitus position.  After   sedation was achieved, the endoscope was passed into the posterior pharynx   under direct visualization and advanced into the esophagus.  There were some   retained secretions, which were suctioned out of the esophagus.  In the distal   esophagus, there was noted to be a bolus of meat above the level of the   gastroesophageal junction.  This was gently pushed into the stomach with   pressure without difficulty.  Beneath the meat bolus, there was noted to be a   gastroesophageal junction ring as well as a slightly ringed appearance in the   distal esophagus.  There was some mild irritation from retained food bolus at   the gastroesophageal junction.  1.  Stomach:  He did have a 3 cm hiatal hernia.  There was mild scattered   erythema consistent with nonerosive gastritis.  2.  Duodenum normal.    IMPRESSION:  1.  Esophageal meat impaction, removed by pushing the meat bolus into the   stomach.  2.   Gastroesophageal junction ring and slightly ringed appearing esophagus   distally.  3.  Hiatal hernia.  4.  Mild gastritis.  5.  No biopsies or dilation performed during this procedure as he is on   chronic Eliquis therapy.    PLAN:  1.  Recommend omeprazole 20 mg daily.  2.  Soft diet.  3.  He will need repeat upper endoscopy in the near future off of Eliquis with   esophageal biopsies and esophageal dilation.       ____________________________________     MD DEREK LONGO / SABINA    DD:  10/27/2019 20:09:11  DT:  10/27/2019 21:55:44    D#:  [id number]  Job#:  [id number]

## 2019-10-28 NOTE — OR SURGEON
Immediate Post OP Note    PreOp Diagnosis: Esophageal meat impaction    PostOp Diagnosis: Esophageal meat impaction        Esophagogastroduodenoscopy    Anesthesiologist/Type of Anesthesia:  Deep sedation with propofol by ED physician    Surgical Staff:  Walt García MD    Specimens removed if any:  None    Estimated Blood Loss: None    Findings:  1. Meat bolus in distal esophagus above GE junction ring, pushed into stomach with gentle pressure  2. Ringed appearing esophagus distally  3. Hiatal hernia  4. Mild gastritis    Complications: None        10/27/2019 8:03 PM Walt García M.D.

## 2019-10-30 LAB — EKG IMPRESSION: NORMAL

## 2019-11-15 ENCOUNTER — OFFICE VISIT (OUTPATIENT)
Dept: DERMATOLOGY | Facility: IMAGING CENTER | Age: 74
End: 2019-11-15
Payer: COMMERCIAL

## 2019-11-15 DIAGNOSIS — L57.0 ACTINIC KERATOSIS: ICD-10-CM

## 2019-11-15 PROBLEM — E29.1 HYPOGONADISM MALE: Status: ACTIVE | Noted: 2019-10-08

## 2019-11-15 PROBLEM — J30.9 RHINITIS, ALLERGIC: Status: ACTIVE | Noted: 2018-04-24

## 2019-11-15 PROBLEM — J45.909 UNSPECIFIED ASTHMA, UNCOMPLICATED: Status: ACTIVE | Noted: 2018-04-24

## 2019-11-15 PROBLEM — R73.9 HYPERGLYCEMIA: Status: ACTIVE | Noted: 2017-04-24

## 2019-11-15 PROCEDURE — 17000 DESTRUCT PREMALG LESION: CPT | Performed by: DERMATOLOGY

## 2019-11-15 PROCEDURE — 99213 OFFICE O/P EST LOW 20 MIN: CPT | Mod: 25 | Performed by: DERMATOLOGY

## 2019-11-15 RX ORDER — TESTOSTERONE CYPIONATE 200 MG/ML
80 INJECTION, SOLUTION INTRAMUSCULAR
COMMUNITY

## 2019-11-15 RX ORDER — DIAZEPAM 5 MG/1
1 TABLET ORAL
COMMUNITY
Start: 2018-08-20 | End: 2021-04-24

## 2019-11-15 RX ORDER — AZELASTINE 1 MG/ML
1 SPRAY, METERED NASAL
COMMUNITY
End: 2021-04-24

## 2019-11-15 RX ORDER — CETIRIZINE HYDROCHLORIDE, PSEUDOEPHEDRINE HYDROCHLORIDE 5; 120 MG/1; MG/1
1 TABLET, FILM COATED, EXTENDED RELEASE ORAL
COMMUNITY
Start: 2016-03-15 | End: 2019-11-15

## 2019-11-15 RX ORDER — OMEPRAZOLE 40 MG/1
40 CAPSULE, DELAYED RELEASE ORAL DAILY
COMMUNITY
Start: 2019-11-12

## 2019-11-15 RX ORDER — ASCORBIC ACID 500 MG
500 TABLET ORAL DAILY
COMMUNITY
End: 2021-04-24

## 2019-11-15 RX ORDER — ATORVASTATIN CALCIUM 20 MG/1
20-40 TABLET, FILM COATED ORAL 2 TIMES DAILY
COMMUNITY
Start: 2019-10-01

## 2019-11-15 RX ORDER — NIACIN 1000 MG/1
1000 TABLET, EXTENDED RELEASE ORAL DAILY
COMMUNITY

## 2019-11-15 NOTE — PROGRESS NOTES
CC: skin lesions    Subjective: Previous Dr Brown patient here for skin lesions on scalp.  Pt would like an Rx for efudex to treat his scalp again, has a few rough lesions.  Also has a spot on back.    HPI/location: LT lower back, dry red spot  Time present: 6 months  Painful lesion: No  Itching lesion: Yes  Enlarging lesion: No  Anything make it better or worse? Clothing rubs against it     History of skin cancer: Yes, Details: BCCs on face, tx'd with Mohs, SCCs (years ago)  H/o actinic keratoses:has treated several lesions in the past with imiquimod 5% cream 3x/week for 4 weeks  History of biopsies:Yes, Details: R shoulder, benign   History of blistering/severe sunburns:Yes, Details: child  Family history of skin cancer:No  Family history of atypical moles:No    ROS: no fevers/chills. No itch.   DermPMH: no skin cancer/melanoma  No problem-specific Assessment & Plan notes found for this encounter.    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  Skin: scalp/face/eyes/lips/neck/lower back examined with findings as noted.  -scalp hair thinning.  No notable hyperkeratotic papules  -face - appearing gaunt.  No suspicious lesions identified  -lower back - isolated thin pink scaly flat-topped papule.    A/P: scalp: hx of AKs, appearing clear today:  -no current efudex trx recommended    Back: consider ISK/SK vs possible AK, appearing benign:  -LN2 25 sec X 2 cycles X 1 lesion  -f/u if persists at 1 month    I have reviewed medications relevant to my specialty.    F/u March 2019 - PAGE    NOTE: 22+ minutes of this visit was spent by MD clarifying medication/problem list and allergy lists with patient ([facility], [facility], Local and another [facility] Everywhere site).  Visit was truncated due to remainder of time available.  MA was tasked to complete final med  list review to compare patient list on home-iPad and current computer list.  This took an additional 3-5 minutes.      Total visit duration with the MD was 30 mins of face:face time including greater than 50% spent counseling the patient regarding care coordination and skin findings and treatments and sun protection/skin cancer detection.

## 2019-12-19 LAB — EKG IMPRESSION: NORMAL

## 2020-01-10 ENCOUNTER — APPOINTMENT (RX ONLY)
Dept: URBAN - METROPOLITAN AREA CLINIC 20 | Facility: CLINIC | Age: 75
Setting detail: DERMATOLOGY
End: 2020-01-10

## 2020-01-10 DIAGNOSIS — L57.0 ACTINIC KERATOSIS: ICD-10-CM

## 2020-01-10 DIAGNOSIS — I78.8 OTHER DISEASES OF CAPILLARIES: ICD-10-CM

## 2020-01-10 DIAGNOSIS — L82.1 OTHER SEBORRHEIC KERATOSIS: ICD-10-CM

## 2020-01-10 PROCEDURE — 17003 DESTRUCT PREMALG LES 2-14: CPT

## 2020-01-10 PROCEDURE — 17000 DESTRUCT PREMALG LESION: CPT

## 2020-01-10 PROCEDURE — 99202 OFFICE O/P NEW SF 15 MIN: CPT | Mod: 25

## 2020-01-10 PROCEDURE — ? COUNSELING

## 2020-01-10 PROCEDURE — ? LIQUID NITROGEN

## 2020-01-10 ASSESSMENT — LOCATION SIMPLE DESCRIPTION DERM
LOCATION SIMPLE: NOSE
LOCATION SIMPLE: POSTERIOR SCALP
LOCATION SIMPLE: LEFT EAR
LOCATION SIMPLE: LEFT SCALP
LOCATION SIMPLE: SCALP
LOCATION SIMPLE: LEFT FOREHEAD
LOCATION SIMPLE: RIGHT FOREHEAD

## 2020-01-10 ASSESSMENT — LOCATION ZONE DERM
LOCATION ZONE: FACE
LOCATION ZONE: SCALP
LOCATION ZONE: NOSE
LOCATION ZONE: EAR

## 2020-01-10 ASSESSMENT — LOCATION DETAILED DESCRIPTION DERM
LOCATION DETAILED: LEFT SUPERIOR FOREHEAD
LOCATION DETAILED: LEFT ANTIHELIX
LOCATION DETAILED: MID-OCCIPITAL SCALP
LOCATION DETAILED: LEFT CENTRAL FRONTAL SCALP
LOCATION DETAILED: NASAL DORSUM
LOCATION DETAILED: RIGHT FOREHEAD
LOCATION DETAILED: LEFT SUPERIOR POSTERIOR PARIETAL SCALP
LOCATION DETAILED: LEFT SUPERIOR PARIETAL SCALP

## 2020-05-12 ENCOUNTER — HOSPITAL ENCOUNTER (OUTPATIENT)
Dept: RADIOLOGY | Facility: MEDICAL CENTER | Age: 75
End: 2020-05-12
Attending: INTERNAL MEDICINE
Payer: COMMERCIAL

## 2020-05-12 DIAGNOSIS — Z98.890 STATUS POST REPAIR OF HYDROCELE: ICD-10-CM

## 2020-05-12 DIAGNOSIS — Z86.73 PERSONAL HISTORY OF TIA (TRANSIENT ISCHEMIC ATTACK): ICD-10-CM

## 2020-05-12 DIAGNOSIS — I48.92 ATRIAL FLUTTER, UNSPECIFIED TYPE (HCC): ICD-10-CM

## 2020-05-12 DIAGNOSIS — Z87.438 STATUS POST REPAIR OF HYDROCELE: ICD-10-CM

## 2020-05-12 PROCEDURE — 93880 EXTRACRANIAL BILAT STUDY: CPT

## 2021-01-12 DIAGNOSIS — Z23 NEED FOR VACCINATION: ICD-10-CM

## 2021-01-17 ENCOUNTER — IMMUNIZATION (OUTPATIENT)
Dept: FAMILY PLANNING/WOMEN'S HEALTH CLINIC | Facility: IMMUNIZATION CENTER | Age: 76
End: 2021-01-17
Attending: INTERNAL MEDICINE
Payer: COMMERCIAL

## 2021-01-17 DIAGNOSIS — Z23 NEED FOR VACCINATION: ICD-10-CM

## 2021-01-17 DIAGNOSIS — Z23 ENCOUNTER FOR VACCINATION: Primary | ICD-10-CM

## 2021-01-17 PROCEDURE — 0011A MODERNA SARS-COV-2 VACCINE: CPT

## 2021-01-17 PROCEDURE — 91301 MODERNA SARS-COV-2 VACCINE: CPT

## 2021-02-14 ENCOUNTER — IMMUNIZATION (OUTPATIENT)
Dept: FAMILY PLANNING/WOMEN'S HEALTH CLINIC | Facility: IMMUNIZATION CENTER | Age: 76
End: 2021-02-14
Attending: INTERNAL MEDICINE
Payer: COMMERCIAL

## 2021-02-14 DIAGNOSIS — Z23 ENCOUNTER FOR VACCINATION: Primary | ICD-10-CM

## 2021-02-14 PROCEDURE — 91301 MODERNA SARS-COV-2 VACCINE: CPT

## 2021-02-14 PROCEDURE — 0012A MODERNA SARS-COV-2 VACCINE: CPT

## 2021-04-24 ENCOUNTER — HOSPITAL ENCOUNTER (EMERGENCY)
Facility: MEDICAL CENTER | Age: 76
End: 2021-04-24
Attending: EMERGENCY MEDICINE
Payer: COMMERCIAL

## 2021-04-24 ENCOUNTER — APPOINTMENT (OUTPATIENT)
Dept: RADIOLOGY | Facility: MEDICAL CENTER | Age: 76
End: 2021-04-24
Attending: EMERGENCY MEDICINE
Payer: COMMERCIAL

## 2021-04-24 VITALS
OXYGEN SATURATION: 95 % | RESPIRATION RATE: 18 BRPM | DIASTOLIC BLOOD PRESSURE: 62 MMHG | BODY MASS INDEX: 20.57 KG/M2 | HEIGHT: 69 IN | TEMPERATURE: 96.9 F | HEART RATE: 73 BPM | WEIGHT: 138.89 LBS | SYSTOLIC BLOOD PRESSURE: 102 MMHG

## 2021-04-24 DIAGNOSIS — R10.84 GENERALIZED ABDOMINAL PAIN: ICD-10-CM

## 2021-04-24 DIAGNOSIS — R07.9 CHEST PAIN, UNSPECIFIED TYPE: ICD-10-CM

## 2021-04-24 LAB
ALBUMIN SERPL BCP-MCNC: 4.5 G/DL (ref 3.2–4.9)
ALBUMIN/GLOB SERPL: 1.6 G/DL
ALP SERPL-CCNC: 158 U/L (ref 30–99)
ALT SERPL-CCNC: 31 U/L (ref 2–50)
ANION GAP SERPL CALC-SCNC: 9 MMOL/L (ref 7–16)
APPEARANCE UR: CLEAR
AST SERPL-CCNC: 44 U/L (ref 12–45)
BASOPHILS # BLD AUTO: 0.5 % (ref 0–1.8)
BASOPHILS # BLD: 0.03 K/UL (ref 0–0.12)
BILIRUB SERPL-MCNC: 0.9 MG/DL (ref 0.1–1.5)
BILIRUB UR QL STRIP.AUTO: NEGATIVE
BUN SERPL-MCNC: 18 MG/DL (ref 8–22)
CALCIUM SERPL-MCNC: 9.6 MG/DL (ref 8.4–10.2)
CHLORIDE SERPL-SCNC: 101 MMOL/L (ref 96–112)
CO2 SERPL-SCNC: 26 MMOL/L (ref 20–33)
COLOR UR: YELLOW
CREAT SERPL-MCNC: 0.82 MG/DL (ref 0.5–1.4)
EOSINOPHIL # BLD AUTO: 0.22 K/UL (ref 0–0.51)
EOSINOPHIL NFR BLD: 3.8 % (ref 0–6.9)
ERYTHROCYTE [DISTWIDTH] IN BLOOD BY AUTOMATED COUNT: 45.2 FL (ref 35.9–50)
GLOBULIN SER CALC-MCNC: 2.8 G/DL (ref 1.9–3.5)
GLUCOSE SERPL-MCNC: 118 MG/DL (ref 65–99)
GLUCOSE UR STRIP.AUTO-MCNC: NEGATIVE MG/DL
HCT VFR BLD AUTO: 44.1 % (ref 42–52)
HGB BLD-MCNC: 14.1 G/DL (ref 14–18)
HYALINE CASTS #/AREA URNS LPF: ABNORMAL /LPF
IMM GRANULOCYTES # BLD AUTO: 0.01 K/UL (ref 0–0.11)
IMM GRANULOCYTES NFR BLD AUTO: 0.2 % (ref 0–0.9)
KETONES UR STRIP.AUTO-MCNC: NEGATIVE MG/DL
LEUKOCYTE ESTERASE UR QL STRIP.AUTO: ABNORMAL
LIPASE SERPL-CCNC: 34 U/L (ref 7–58)
LYMPHOCYTES # BLD AUTO: 1.69 K/UL (ref 1–4.8)
LYMPHOCYTES NFR BLD: 29.2 % (ref 22–41)
MCH RBC QN AUTO: 27.2 PG (ref 27–33)
MCHC RBC AUTO-ENTMCNC: 32 G/DL (ref 33.7–35.3)
MCV RBC AUTO: 85 FL (ref 81.4–97.8)
MICRO URNS: ABNORMAL
MONOCYTES # BLD AUTO: 0.69 K/UL (ref 0–0.85)
MONOCYTES NFR BLD AUTO: 11.9 % (ref 0–13.4)
MUCOUS THREADS #/AREA URNS HPF: ABNORMAL /HPF
NEUTROPHILS # BLD AUTO: 3.14 K/UL (ref 1.82–7.42)
NEUTROPHILS NFR BLD: 54.4 % (ref 44–72)
NITRITE UR QL STRIP.AUTO: NEGATIVE
NRBC # BLD AUTO: 0 K/UL
NRBC BLD-RTO: 0 /100 WBC
PH UR STRIP.AUTO: 6.5 [PH] (ref 5–8)
PLATELET # BLD AUTO: 249 K/UL (ref 164–446)
PMV BLD AUTO: 9.9 FL (ref 9–12.9)
POTASSIUM SERPL-SCNC: 4.2 MMOL/L (ref 3.6–5.5)
PROT SERPL-MCNC: 7.3 G/DL (ref 6–8.2)
PROT UR QL STRIP: NEGATIVE MG/DL
PSA SERPL-MCNC: 2.56 NG/ML (ref 0–4)
RBC # BLD AUTO: 5.19 M/UL (ref 4.7–6.1)
RBC # URNS HPF: ABNORMAL /HPF
RBC UR QL AUTO: ABNORMAL
SODIUM SERPL-SCNC: 136 MMOL/L (ref 135–145)
SP GR UR STRIP.AUTO: 1.02
UNIDENT CRYS URNS QL MICRO: ABNORMAL /HPF
WBC # BLD AUTO: 5.8 K/UL (ref 4.8–10.8)
WBC #/AREA URNS HPF: ABNORMAL /HPF

## 2021-04-24 PROCEDURE — 36415 COLL VENOUS BLD VENIPUNCTURE: CPT

## 2021-04-24 PROCEDURE — 81001 URINALYSIS AUTO W/SCOPE: CPT

## 2021-04-24 PROCEDURE — 84153 ASSAY OF PSA TOTAL: CPT

## 2021-04-24 PROCEDURE — 83690 ASSAY OF LIPASE: CPT

## 2021-04-24 PROCEDURE — 80053 COMPREHEN METABOLIC PANEL: CPT

## 2021-04-24 PROCEDURE — 99284 EMERGENCY DEPT VISIT MOD MDM: CPT

## 2021-04-24 PROCEDURE — 85025 COMPLETE CBC W/AUTO DIFF WBC: CPT

## 2021-04-24 PROCEDURE — 700117 HCHG RX CONTRAST REV CODE 255: Performed by: EMERGENCY MEDICINE

## 2021-04-24 PROCEDURE — 71260 CT THORAX DX C+: CPT | Mod: ME

## 2021-04-24 RX ORDER — ANASTROZOLE 1 MG/1
1 TABLET ORAL
COMMUNITY

## 2021-04-24 RX ORDER — PSEUDOEPHEDRINE HCL 120 MG/1
60 TABLET, FILM COATED, EXTENDED RELEASE ORAL EVERY MORNING
COMMUNITY

## 2021-04-24 RX ORDER — METOPROLOL SUCCINATE 50 MG/1
25 TABLET, EXTENDED RELEASE ORAL DAILY
COMMUNITY

## 2021-04-24 RX ADMIN — IOHEXOL 100 ML: 350 INJECTION, SOLUTION INTRAVENOUS at 10:09

## 2021-04-24 ASSESSMENT — LIFESTYLE VARIABLES
DO YOU DRINK ALCOHOL: NO
AVERAGE NUMBER OF DAYS PER WEEK YOU HAVE A DRINK CONTAINING ALCOHOL: 0
HAVE PEOPLE ANNOYED YOU BY CRITICIZING YOUR DRINKING: NO
EVER HAD A DRINK FIRST THING IN THE MORNING TO STEADY YOUR NERVES TO GET RID OF A HANGOVER: NO
TOTAL SCORE: 0
HOW MANY TIMES IN THE PAST YEAR HAVE YOU HAD 5 OR MORE DRINKS IN A DAY: 0
HAVE YOU EVER FELT YOU SHOULD CUT DOWN ON YOUR DRINKING: NO
EVER FELT BAD OR GUILTY ABOUT YOUR DRINKING: NO
TOTAL SCORE: 0
CONSUMPTION TOTAL: NEGATIVE
TOTAL SCORE: 0
ON A TYPICAL DAY WHEN YOU DRINK ALCOHOL HOW MANY DRINKS DO YOU HAVE: 0

## 2021-04-24 ASSESSMENT — FIBROSIS 4 INDEX: FIB4 SCORE: 2.38

## 2021-04-24 NOTE — ED PROVIDER NOTES
"ED Provider Note    CHIEF COMPLAINT  Chief Complaint   Patient presents with   • Abdominal Pain   • Blood in Urine   • Weight Loss       HPI  Laci Baugh is a 75 y.o. male who presents to the emergency room today with complaints of abdominal pain, weight loss, chest wall pain and blood in his urine.  Patient symptoms have been present for 6 to 9  months but getting worse prompting him to come to the emergency room. according to his wife he  today told his wife today and she was quite concerned and brought the patient to the emergency room patient states he is lost 15 to 20 pounds over the last several months with pain is intermittent to his chest wall and abdomen.  He has had blood in his urine and this has been present before and has seen urology in fact he has appointment set up with Dr. Guerra in May 2021 For follow-up.  It is sharp and nonreproducible.  He feels like he is emaciated from his weight loss and is concerned that he may have cancer.    REVIEW OF SYSTEMS  See HPI for further details. All other systems are negative.     PAST MEDICAL HISTORY  Past Medical History:   Diagnosis Date   • Diabetes (HCC) 12/27/2018    \"PRE\", on oral meds   • Cold 12/2018   • Bowel habit changes     constipation   • Cataract     removed bilat   • Environmental and seasonal allergies    • Heart burn    • Heart valve disease        • Hiatus hernia syndrome    • High cholesterol    • History of BPH    • Indigestion    • Skin cancer     basal cell carcinoma & squamous cell carcinoma   • Snoring     no sleep study   • TIA (transient ischemic attack)     reports 1980/2005/2013       FAMILY HISTORY  [unfilled]    SOCIAL HISTORY  Social History     Socioeconomic History   • Marital status:      Spouse name: Not on file   • Number of children: Not on file   • Years of education: Not on file   • Highest education level: Not on file   Occupational History   • Not on file   Tobacco Use   • Smoking status: Former " Smoker     Packs/day: 1.00     Years: 6.00     Pack years: 6.00     Types: Cigarettes     Quit date: 1966     Years since quittin.3   • Smokeless tobacco: Never Used   Substance and Sexual Activity   • Alcohol use: Yes     Alcohol/week: 0.0 oz     Comment: 1 glass wine/night   • Drug use: No   • Sexual activity: Not on file   Other Topics Concern   • Not on file   Social History Narrative   • Not on file     Social Determinants of Health     Financial Resource Strain:    • Difficulty of Paying Living Expenses:    Food Insecurity:    • Worried About Running Out of Food in the Last Year:    • Ran Out of Food in the Last Year:    Transportation Needs:    • Lack of Transportation (Medical):    • Lack of Transportation (Non-Medical):    Physical Activity:    • Days of Exercise per Week:    • Minutes of Exercise per Session:    Stress:    • Feeling of Stress :    Social Connections:    • Frequency of Communication with Friends and Family:    • Frequency of Social Gatherings with Friends and Family:    • Attends Alevism Services:    • Active Member of Clubs or Organizations:    • Attends Club or Organization Meetings:    • Marital Status:    Intimate Partner Violence:    • Fear of Current or Ex-Partner:    • Emotionally Abused:    • Physically Abused:    • Sexually Abused:        SURGICAL HISTORY  Past Surgical History:   Procedure Laterality Date   • PB UPPER GI ENDOSCOPY,REMOV F.B.  10/27/2019    Procedure: GASTROSCOPY, WITH FOREIGN BODY REMOVAL;  Surgeon: Walt García M.D.;  Location: SURGERY [facility];  Service: Gastroenterology   • ECTROPIAN REPAIR Bilateral 2019    Procedure: ECTROPIAN REPAIR- LOWER LID WITH LATERAL TARSAL STRIPS AND MIDFACE ADVANCEMENT FLAPS;  Surgeon: Ethan Goldstein M.D.;  Location: SURGERY SAME DAY [facility];  Service: Ophthalmology   • OTHER SURGICAL PROCEDURE      skin cancer removal       CURRENT MEDICATIONS  Home Medications     Reviewed by Chetna RODAS  "Tarsha Jenkins (Pharmacy Tech) on 04/24/21 at 0959  Med List Status: Complete   Medication Last Dose Status   anastrozole (ARIMIDEX) 1 MG Tab 4/22/2021 Active   apixaban (ELIQUIS) 5mg Tab 4/24/2021 Active   aspirin EC (ECOTRIN) 81 MG Tablet Delayed Response 4/24/2021 Active   atorvastatin (LIPITOR) 20 MG Tab 4/24/2021 Active   B Complex Vitamins (B COMPLEX PO) 4/24/2021 Active   Cholecalciferol (VITAMIN D3) 5000 units Cap 4/24/2021 Active   Coenzyme Q10 300 MG Cap 4/24/2021 Active   fluticasone (FLONASE) 50 MCG/ACT nasal spray 4/24/2021 Active   Levocetirizine Dihydrochloride 5 MG Tab 4/24/2021 Active   metformin ER (GLUCOPHAGE XR) 750 MG TABLET SR 24 HR 4/24/2021 Active   metoprolol SR (TOPROL XL) 50 MG TABLET SR 24 HR 4/23/2021 Active   niacin (NIASPAN) 1000 MG CR tablet 4/24/2021 Active   niacinamide 500 MG tablet 4/24/2021 Active   olopatadine (PATANOL) 0.1 % ophthalmic solution 4/23/2021 Active   omeprazole (PRILOSEC) 40 MG delayed-release capsule 4/24/2021 Active   polyethylene glycol/lytes (MIRALAX) Pack 4/24/2021 Active   pseudoephedrine SR (SUDAFED SR) 120 MG TABLET SR 12 HR 4/24/2021 Active   testosterone cypionate (DEPO-TESTOSTERONE) 200 MG/ML Solution injection 4/22/2021 Active                ALLERGIES  Allergies   Allergen Reactions   • Other Food Cough     Reports multiple food related allergies.       PHYSICAL EXAM  VITAL SIGNS: /62   Pulse 73   Temp 36.1 °C (96.9 °F) (Temporal)   Resp 18   Ht 1.753 m (5' 9\")   Wt 63 kg (138 lb 14.2 oz)   SpO2 95%   BMI 20.51 kg/m²       Constitutional: Well developed, Well nourished,  acute distress, Non-toxic appearance.   HENT: Normocephalic, Atraumatic, Bilateral external ears normal, Oropharynx moist, No oral exudates, Nose normal.   Eyes: PERRLA, EOMI, Conjunctiva normal, No discharge.   Neck: Normal range of motion, No tenderness, Supple, No stridor.   Lymphatic: No lymphadenopathy noted.   Cardiovascular: Normal heart rate, Normal rhythm, No murmurs, " No rubs, No gallops.   Thorax & Lungs: Normal breath sounds, No respiratory distress, No wheezing, diffuse chest tenderness.   Abdomen: Bowel sounds normal, Soft, diffuse tenderness, No masses, No pulsatile masses.  No rebound, guarding or peritoneal signs noted  Skin: Warm, Dry, No erythema, No rash.   Back: No tenderness, No CVA tenderness.      Extremities: Intact distal pulses, No edema, No tenderness, No cyanosis, No clubbing.   Musculoskeletal: Good range of motion in all major joints. No tenderness to palpation or major deformities noted.   Neurologic: Alert & oriented x 3, Normal motor function, Normal sensory function, No focal deficits noted.   Psychiatric: Affect normal, Judgment normal, Mood normal.     EKG  Normal sinus rhythm 70 bpm, no ST elevation or depression, no widening of the QRS complex, good R wave progression DE interval's are normal, this is a 12-lead EKG there is no previous EKG available this time for comparison.    RADIOLOGY/PROCEDURES  CT-CHEST,ABDOMEN,PELVIS WITH   Final Result      No acute abnormality is identified.      Sequelae of prior granulomatous exposure.      Atherosclerotic plaque including coronary artery calcification.      Prominent and lobulated prostate.      Trabeculated bladder likely related to obstructive uropathy.      Colonic diverticulosis. Moderate amount of colonic stool.      Cardiomegaly.      Mild atelectasis.                  COURSE & MEDICAL DECISION MAKING  Pertinent Labs & Imaging studies reviewed. (See chart for details)  CT scans were reviewed with patient there is no evidence of any tumor or mass or any acute process noted on CT chest abdomen or pelvis.  Patient's blood tests were reviewed he does have a  elevated alkaline phosphatase and this is unexplained at this point rest of blood tests were essentially unremarkable.  Here was recommended follow-up with urologist as he is scheduled possibly earlier appointment if he contacts in on Monday for his  hematuria.  PSA has been ordered and he can follow this up with his urologist.  Also discussed following up in contacting GI specialist who is given referral to Dr. cabrera which patient has seen previously.  Both patient and his wife verbalized understand instructions the importance of follow-up he has increased pain develops fever develops vomiting or worsening symptoms next 12 to 24 hours return promptly to the emergency room.  On reexamination prior to discharge abdomen soft, supple, nonsurgical.  Patient's pain has been present for many months with no EKG changes and troponins are normal do not feel this is ischemic cardiac pain.    FINAL IMPRESSION  1.  Acute abdominal pain  2.  Nonspecific chest pain  3.  Hematuria  4.  Diabetes mellitus by history         Electronically signed by: Mason Cantor D.O., 4/24/2021 2:55 PM

## 2021-04-24 NOTE — ED TRIAGE NOTES
"Presents accompanied by family.  Pt C/O unwanted weight loss recurring for the past 3 months, and generalized abdominal pain with hematuria observed for the past month or so.   Chief Complaint   Patient presents with   • Abdominal Pain   • Blood in Urine   • Weight Loss     /79   Pulse 99   Temp 36 °C (96.8 °F) (Temporal)   Resp 18   Ht 1.753 m (5' 9\")   Wt 63 kg (138 lb 14.2 oz)   SpO2 95%   BMI 20.51 kg/m²      "

## 2021-04-24 NOTE — ED NOTES
Assumed care of pt at this specific time; no acute exacerbations in condition are noted since last evaluation.  Call light is within reach and pt is strongly encouraged to call for any assistance.   Family member is at bedside.

## 2021-05-04 ENCOUNTER — HOSPITAL ENCOUNTER (OUTPATIENT)
Dept: RADIOLOGY | Facility: MEDICAL CENTER | Age: 76
End: 2021-05-04
Payer: COMMERCIAL

## 2021-05-25 ENCOUNTER — HOSPITAL ENCOUNTER (OUTPATIENT)
Dept: RADIOLOGY | Facility: MEDICAL CENTER | Age: 76
End: 2021-05-25
Payer: COMMERCIAL

## 2021-05-25 ENCOUNTER — HOSPITAL ENCOUNTER (OUTPATIENT)
Dept: RADIOLOGY | Facility: MEDICAL CENTER | Age: 76
End: 2021-05-25
Attending: INTERNAL MEDICINE
Payer: COMMERCIAL

## 2021-05-25 DIAGNOSIS — Z98.1 POSTSURGICAL ARTHRODESIS STATUS: ICD-10-CM

## 2021-05-25 DIAGNOSIS — J18.9 UNRESOLVED PNEUMONIA: ICD-10-CM

## 2021-05-25 PROCEDURE — 71046 X-RAY EXAM CHEST 2 VIEWS: CPT

## 2021-05-25 PROCEDURE — 72040 X-RAY EXAM NECK SPINE 2-3 VW: CPT

## 2021-08-20 ENCOUNTER — APPOINTMENT (RX ONLY)
Dept: URBAN - METROPOLITAN AREA CLINIC 4 | Facility: CLINIC | Age: 76
Setting detail: DERMATOLOGY
End: 2021-08-20

## 2021-08-20 DIAGNOSIS — L82.1 OTHER SEBORRHEIC KERATOSIS: ICD-10-CM

## 2021-08-20 DIAGNOSIS — D485 NEOPLASM OF UNCERTAIN BEHAVIOR OF SKIN: ICD-10-CM

## 2021-08-20 DIAGNOSIS — Z85.828 PERSONAL HISTORY OF OTHER MALIGNANT NEOPLASM OF SKIN: ICD-10-CM | Status: STABLE

## 2021-08-20 DIAGNOSIS — D22 MELANOCYTIC NEVI: ICD-10-CM

## 2021-08-20 DIAGNOSIS — Z71.89 OTHER SPECIFIED COUNSELING: ICD-10-CM

## 2021-08-20 DIAGNOSIS — L57.0 ACTINIC KERATOSIS: ICD-10-CM

## 2021-08-20 DIAGNOSIS — D18.0 HEMANGIOMA: ICD-10-CM

## 2021-08-20 DIAGNOSIS — L81.4 OTHER MELANIN HYPERPIGMENTATION: ICD-10-CM

## 2021-08-20 PROBLEM — D18.01 HEMANGIOMA OF SKIN AND SUBCUTANEOUS TISSUE: Status: ACTIVE | Noted: 2021-08-20

## 2021-08-20 PROBLEM — D22.5 MELANOCYTIC NEVI OF TRUNK: Status: ACTIVE | Noted: 2021-08-20

## 2021-08-20 PROBLEM — D48.5 NEOPLASM OF UNCERTAIN BEHAVIOR OF SKIN: Status: ACTIVE | Noted: 2021-08-20

## 2021-08-20 PROCEDURE — ? COUNSELING

## 2021-08-20 PROCEDURE — ? LIQUID NITROGEN

## 2021-08-20 PROCEDURE — 17004 DESTROY PREMAL LESIONS 15/>: CPT

## 2021-08-20 PROCEDURE — ? SUNSCREEN TREATMENT REGIMEN

## 2021-08-20 PROCEDURE — 99213 OFFICE O/P EST LOW 20 MIN: CPT | Mod: 25

## 2021-08-20 PROCEDURE — ? OBSERVATION

## 2021-08-20 ASSESSMENT — LOCATION DETAILED DESCRIPTION DERM
LOCATION DETAILED: RIGHT RADIAL DORSAL HAND
LOCATION DETAILED: LEFT SUPERIOR CRUS OF ANTIHELIX
LOCATION DETAILED: MID-OCCIPITAL SCALP
LOCATION DETAILED: RIGHT INFERIOR CENTRAL MALAR CHEEK
LOCATION DETAILED: LEFT CENTRAL ZYGOMA
LOCATION DETAILED: INFERIOR MID FOREHEAD
LOCATION DETAILED: RIGHT SUPERIOR OCCIPITAL SCALP
LOCATION DETAILED: LEFT RADIAL DORSAL HAND
LOCATION DETAILED: LEFT SUPERIOR CRUS OF ANTIHELIX
LOCATION DETAILED: RIGHT CENTRAL MALAR CHEEK
LOCATION DETAILED: EPIGASTRIC SKIN
LOCATION DETAILED: LEFT ULNAR DORSAL HAND
LOCATION DETAILED: STERNUM
LOCATION DETAILED: SUPERIOR MID FOREHEAD
LOCATION DETAILED: PERIUMBILICAL SKIN
LOCATION DETAILED: LEFT SUPERIOR CENTRAL MALAR CHEEK
LOCATION DETAILED: LEFT POSTERIOR PARIETAL SCALP
LOCATION DETAILED: LEFT CENTRAL MALAR CHEEK
LOCATION DETAILED: LEFT MEDIAL INFERIOR CHEST
LOCATION DETAILED: LEFT SUPERIOR PARIETAL SCALP
LOCATION DETAILED: LEFT CENTRAL FRONTAL SCALP
LOCATION DETAILED: LEFT MEDIAL SUPERIOR CHEST
LOCATION DETAILED: RIGHT LATERAL MALAR CHEEK
LOCATION DETAILED: LEFT SUPERIOR OCCIPITAL SCALP
LOCATION DETAILED: LEFT CLAVICULAR NECK
LOCATION DETAILED: LEFT INFERIOR CENTRAL MALAR CHEEK

## 2021-08-20 ASSESSMENT — LOCATION SIMPLE DESCRIPTION DERM
LOCATION SIMPLE: LEFT EAR
LOCATION SIMPLE: LEFT SCALP
LOCATION SIMPLE: LEFT OCCIPITAL SCALP
LOCATION SIMPLE: LEFT CHEEK
LOCATION SIMPLE: LEFT HAND
LOCATION SIMPLE: POSTERIOR SCALP
LOCATION SIMPLE: RIGHT HAND
LOCATION SIMPLE: RIGHT OCCIPITAL SCALP
LOCATION SIMPLE: LEFT EAR
LOCATION SIMPLE: LEFT ANTERIOR NECK
LOCATION SIMPLE: SCALP
LOCATION SIMPLE: ABDOMEN
LOCATION SIMPLE: INFERIOR FOREHEAD
LOCATION SIMPLE: SUPERIOR FOREHEAD
LOCATION SIMPLE: RIGHT CHEEK
LOCATION SIMPLE: CHEST
LOCATION SIMPLE: LEFT ZYGOMA

## 2021-08-20 ASSESSMENT — LOCATION ZONE DERM
LOCATION ZONE: FACE
LOCATION ZONE: EAR
LOCATION ZONE: NECK
LOCATION ZONE: SCALP
LOCATION ZONE: HAND
LOCATION ZONE: EAR
LOCATION ZONE: TRUNK

## 2021-08-20 NOTE — PROCEDURE: LIQUID NITROGEN
Show Applicator Variable?: Yes
Render Note In Bullet Format When Appropriate: No
Duration Of Freeze Thaw-Cycle (Seconds): 5
Post-Care Instructions: I reviewed with the patient in detail post-care instructions. Patient is to wear sunprotection, and avoid picking at any of the treated lesions. Pt may apply Vaseline to crusted or scabbing areas.
Detail Level: Detailed
Number Of Freeze-Thaw Cycles: 2 freeze-thaw cycles
Consent: The patient's consent was obtained including but not limited to risks of crusting, scabbing, blistering, scarring, darker or lighter pigmentary change, recurrence, incomplete removal and infection.

## 2021-10-11 ENCOUNTER — APPOINTMENT (RX ONLY)
Dept: URBAN - METROPOLITAN AREA CLINIC 4 | Facility: CLINIC | Age: 76
Setting detail: DERMATOLOGY
End: 2021-10-11

## 2021-10-11 DIAGNOSIS — Z71.89 OTHER SPECIFIED COUNSELING: ICD-10-CM

## 2021-10-11 DIAGNOSIS — Z85.828 PERSONAL HISTORY OF OTHER MALIGNANT NEOPLASM OF SKIN: ICD-10-CM | Status: STABLE

## 2021-10-11 DIAGNOSIS — L57.0 ACTINIC KERATOSIS: ICD-10-CM

## 2021-10-11 PROCEDURE — ? COUNSELING

## 2021-10-11 PROCEDURE — 99213 OFFICE O/P EST LOW 20 MIN: CPT | Mod: 25

## 2021-10-11 PROCEDURE — ? ADDITIONAL NOTES

## 2021-10-11 PROCEDURE — ? SUNSCREEN TREATMENT REGIMEN

## 2021-10-11 PROCEDURE — ? ORDER TESTS

## 2021-10-11 PROCEDURE — 17003 DESTRUCT PREMALG LES 2-14: CPT

## 2021-10-11 PROCEDURE — 17000 DESTRUCT PREMALG LESION: CPT

## 2021-10-11 PROCEDURE — ? LIQUID NITROGEN

## 2021-10-11 ASSESSMENT — LOCATION DETAILED DESCRIPTION DERM
LOCATION DETAILED: LEFT ULNAR DORSAL HAND
LOCATION DETAILED: LEFT INFERIOR FOREHEAD
LOCATION DETAILED: LEFT SUPERIOR CRUS OF ANTIHELIX
LOCATION DETAILED: RIGHT SUPERIOR PREAURICULAR CHEEK
LOCATION DETAILED: LEFT RADIAL DORSAL HAND
LOCATION DETAILED: LEFT SUPERIOR FOREHEAD
LOCATION DETAILED: LEFT FOREHEAD
LOCATION DETAILED: RIGHT ULNAR DORSAL HAND
LOCATION DETAILED: RIGHT SUPERIOR CENTRAL MALAR CHEEK
LOCATION DETAILED: RIGHT CENTRAL TEMPLE
LOCATION DETAILED: RIGHT RADIAL DORSAL HAND
LOCATION DETAILED: SUPERIOR MID FOREHEAD
LOCATION DETAILED: RIGHT FOREHEAD
LOCATION DETAILED: LEFT SUPERIOR LATERAL MALAR CHEEK

## 2021-10-11 ASSESSMENT — LOCATION SIMPLE DESCRIPTION DERM
LOCATION SIMPLE: RIGHT FOREHEAD
LOCATION SIMPLE: LEFT HAND
LOCATION SIMPLE: LEFT FOREHEAD
LOCATION SIMPLE: RIGHT CHEEK
LOCATION SIMPLE: RIGHT TEMPLE
LOCATION SIMPLE: RIGHT HAND
LOCATION SIMPLE: SUPERIOR FOREHEAD
LOCATION SIMPLE: LEFT CHEEK
LOCATION SIMPLE: LEFT EAR

## 2021-10-11 ASSESSMENT — LOCATION ZONE DERM
LOCATION ZONE: FACE
LOCATION ZONE: HAND
LOCATION ZONE: EAR

## 2021-10-11 NOTE — PROCEDURE: ADDITIONAL NOTES
Render Risk Assessment In Note?: no
Detail Level: Simple
Additional Notes: Patient states lost 20+ lbs over last couple years, 3-5 pounds in last couple months, w/u thus far negative, h/o exposure to several cancer-inducing agents in [employer], PT scan ordered per patient/wife request; reviewd r/b of scan (radiation exposure, etc)

## 2021-10-11 NOTE — PROCEDURE: ORDER TESTS
Bill For Surgical Tray: no
Billing Type: Third-Party Bill
Expected Date Of Service: 10/11/2021
Performing Laboratory: -[id number]
Performing Laboratory: 0

## 2021-10-11 NOTE — PROCEDURE: LIQUID NITROGEN
Consent: The patient's consent was obtained including but not limited to risks of crusting, scabbing, blistering, scarring, darker or lighter pigmentary change, recurrence, incomplete removal and infection.
Show Aperture Variable?: Yes
Render Post-Care Instructions In Note?: no
Number Of Freeze-Thaw Cycles: 2 freeze-thaw cycles
Detail Level: Detailed
Duration Of Freeze Thaw-Cycle (Seconds): 5
Post-Care Instructions: I reviewed with the patient in detail post-care instructions. Patient is to wear sunprotection, and avoid picking at any of the treated lesions. Pt may apply Vaseline to crusted or scabbing areas.

## 2021-12-28 ENCOUNTER — HOSPITAL ENCOUNTER (OUTPATIENT)
Dept: RADIOLOGY | Facility: MEDICAL CENTER | Age: 76
End: 2021-12-28
Attending: NEUROLOGICAL SURGERY | Admitting: NEUROLOGICAL SURGERY
Payer: COMMERCIAL

## 2021-12-28 DIAGNOSIS — Z98.1 ARTHRODESIS STATUS: ICD-10-CM

## 2021-12-28 PROCEDURE — 72040 X-RAY EXAM NECK SPINE 2-3 VW: CPT

## 2022-01-11 ENCOUNTER — APPOINTMENT (RX ONLY)
Dept: URBAN - METROPOLITAN AREA CLINIC 6 | Facility: CLINIC | Age: 77
Setting detail: DERMATOLOGY
End: 2022-01-11

## 2022-01-11 DIAGNOSIS — L57.0 ACTINIC KERATOSIS: ICD-10-CM

## 2022-01-11 PROBLEM — D48.5 NEOPLASM OF UNCERTAIN BEHAVIOR OF SKIN: Status: ACTIVE | Noted: 2022-01-11

## 2022-01-11 PROCEDURE — 17004 DESTROY PREMAL LESIONS 15/>: CPT

## 2022-01-11 PROCEDURE — ? BIOPSY BY SHAVE METHOD

## 2022-01-11 PROCEDURE — ? COUNSELING

## 2022-01-11 PROCEDURE — ? LIQUID NITROGEN

## 2022-01-11 PROCEDURE — 11102 TANGNTL BX SKIN SINGLE LES: CPT | Mod: 59

## 2022-01-11 ASSESSMENT — LOCATION DETAILED DESCRIPTION DERM
LOCATION DETAILED: LEFT SUPERIOR PARIETAL SCALP
LOCATION DETAILED: RIGHT MEDIAL FOREHEAD
LOCATION DETAILED: MID-OCCIPITAL SCALP
LOCATION DETAILED: SUPERIOR MID FOREHEAD
LOCATION DETAILED: LEFT POSTERIOR PARIETAL SCALP
LOCATION DETAILED: RIGHT INFERIOR FOREHEAD
LOCATION DETAILED: RIGHT SUPERIOR NASAL CHEEK
LOCATION DETAILED: LEFT FOREHEAD
LOCATION DETAILED: LEFT CENTRAL PARIETAL SCALP
LOCATION DETAILED: LEFT CENTRAL FRONTAL SCALP
LOCATION DETAILED: LEFT SUPERIOR LATERAL NECK
LOCATION DETAILED: LEFT CENTRAL TEMPLE
LOCATION DETAILED: LEFT MEDIAL CANTHUS

## 2022-01-11 ASSESSMENT — LOCATION ZONE DERM
LOCATION ZONE: NECK
LOCATION ZONE: EYELID
LOCATION ZONE: FACE
LOCATION ZONE: SCALP

## 2022-01-11 ASSESSMENT — LOCATION SIMPLE DESCRIPTION DERM
LOCATION SIMPLE: RIGHT FOREHEAD
LOCATION SIMPLE: LEFT EYELID
LOCATION SIMPLE: RIGHT CHEEK
LOCATION SIMPLE: POSTERIOR SCALP
LOCATION SIMPLE: LEFT FOREHEAD
LOCATION SIMPLE: SUPERIOR FOREHEAD
LOCATION SIMPLE: NECK
LOCATION SIMPLE: LEFT TEMPLE
LOCATION SIMPLE: SCALP

## 2022-01-11 NOTE — PROCEDURE: LIQUID NITROGEN
Post-Care Instructions: I reviewed with the patient in detail post-care instructions. Patient is to wear sunprotection, and avoid picking at any of the treated lesions. Pt may apply Vaseline to crusted or scabbing areas.
Number Of Freeze-Thaw Cycles: 2 freeze-thaw cycles
Render Post-Care Instructions In Note?: no
Consent: The patient's consent was obtained including but not limited to risks of crusting, scabbing, blistering, scarring, darker or lighter pigmentary change, recurrence, incomplete removal and infection.
Detail Level: Detailed
Duration Of Freeze Thaw-Cycle (Seconds): 5
Show Aperture Variable?: Yes

## 2022-01-11 NOTE — PROCEDURE: MIPS QUALITY
Quality 130: Documentation Of Current Medications In The Medical Record: Current Medications Documented
Quality 111:Pneumonia Vaccination Status For Older Adults: Pneumococcal Vaccination Previously Received
Detail Level: Detailed
Quality 226: Preventive Care And Screening: Tobacco Use: Screening And Cessation Intervention: Patient screened for tobacco use and is an ex/non-smoker
Quality 431: Preventive Care And Screening: Unhealthy Alcohol Use - Screening: Patient not identified as an unhealthy alcohol user when screened for unhealthy alcohol use using a systematic screening method

## 2022-02-14 ENCOUNTER — APPOINTMENT (RX ONLY)
Dept: URBAN - METROPOLITAN AREA CLINIC 6 | Facility: CLINIC | Age: 77
Setting detail: DERMATOLOGY
End: 2022-02-14

## 2022-02-14 DIAGNOSIS — L57.0 ACTINIC KERATOSIS: ICD-10-CM

## 2022-02-14 PROBLEM — D04.4 CARCINOMA IN SITU OF SKIN OF SCALP AND NECK: Status: ACTIVE | Noted: 2022-02-14

## 2022-02-14 PROCEDURE — 17272 DSTR MAL LES S/N/H/F/G 1.1-2: CPT

## 2022-02-14 PROCEDURE — ? COUNSELING

## 2022-02-14 PROCEDURE — 17003 DESTRUCT PREMALG LES 2-14: CPT

## 2022-02-14 PROCEDURE — 17000 DESTRUCT PREMALG LESION: CPT | Mod: 59

## 2022-02-14 PROCEDURE — ? CURETTAGE AND DESTRUCTION

## 2022-02-14 PROCEDURE — ? LIQUID NITROGEN

## 2022-02-14 ASSESSMENT — LOCATION ZONE DERM
LOCATION ZONE: FACE
LOCATION ZONE: SCALP

## 2022-02-14 ASSESSMENT — LOCATION DETAILED DESCRIPTION DERM
LOCATION DETAILED: LEFT SUPERIOR PARIETAL SCALP
LOCATION DETAILED: RIGHT CENTRAL FRONTAL SCALP
LOCATION DETAILED: POSTERIOR MID-PARIETAL SCALP
LOCATION DETAILED: RIGHT SUPERIOR OCCIPITAL SCALP
LOCATION DETAILED: MID-OCCIPITAL SCALP
LOCATION DETAILED: RIGHT SUPERIOR POSTERIOR PARIETAL SCALP
LOCATION DETAILED: LEFT SUPERIOR MEDIAL FOREHEAD
LOCATION DETAILED: LEFT MEDIAL FRONTAL SCALP
LOCATION DETAILED: MID-FRONTAL SCALP
LOCATION DETAILED: RIGHT CENTRAL PARIETAL SCALP

## 2022-02-14 ASSESSMENT — LOCATION SIMPLE DESCRIPTION DERM
LOCATION SIMPLE: SCALP
LOCATION SIMPLE: RIGHT OCCIPITAL SCALP
LOCATION SIMPLE: RIGHT SCALP
LOCATION SIMPLE: LEFT SCALP
LOCATION SIMPLE: LEFT FOREHEAD
LOCATION SIMPLE: POSTERIOR SCALP
LOCATION SIMPLE: ANTERIOR SCALP

## 2022-02-14 NOTE — PROCEDURE: LIQUID NITROGEN
Post-Care Instructions: I reviewed with the patient in detail post-care instructions. Patient is to wear sunprotection, and avoid picking at any of the treated lesions. Pt may apply Vaseline to crusted or scabbing areas.
Consent: The patient's consent was obtained including but not limited to risks of crusting, scabbing, blistering, scarring, darker or lighter pigmentary change, recurrence, incomplete removal and infection.
Number Of Freeze-Thaw Cycles: 2 freeze-thaw cycles
Duration Of Freeze Thaw-Cycle (Seconds): 10
Show Aperture Variable?: Yes
Detail Level: Detailed
Render Note In Bullet Format When Appropriate: No

## 2022-02-14 NOTE — PROCEDURE: CURETTAGE AND DESTRUCTION
Detail Level: Detailed
Biopsy Photograph Reviewed: Yes
Accession # (Optional): [id number]
Number Of Curettages: 3
Size Of Lesion In Cm: 0.7
Size Of Lesion After Curettage: 1.1
Add Intralesional Injection: No
Concentration (Mg/Ml Or Millions Of Plaque Forming Units/Cc): 0.01
Total Volume (Ccs): 1
Anesthesia Type: 1% lidocaine with epinephrine and a 1:10 solution of 8.4% sodium bicarbonate
Cautery Type: electrodesiccation
What Was Performed First?: Curettage
Final Size Statement: The size of the lesion after curettage was
Additional Information: (Optional): The wound was cleaned, and a pressure dressing was applied.  The patient received detailed post-op instructions.
Consent was obtained from the patient. The risks, benefits and alternatives to therapy were discussed in detail. Specifically, the risks of infection, scarring, bleeding, prolonged wound healing, nerve injury, incomplete removal, allergy to anesthesia and recurrence were addressed. Alternatives to ED&C, such as: surgical removal and XRT were also discussed.  Prior to the procedure, the treatment site was clearly identified and confirmed by the patient. All components of Universal Protocol/PAUSE Rule completed.
Post-Care Instructions: I reviewed with the patient in detail post-care instructions. Patient is to keep the area dry for 48 hours, and not to engage in any swimming until the area is healed. Should the patient develop any fevers, chills, bleeding, severe pain patient will contact the office immediately.
Bill As A Line Item Or As Units: Line Item

## 2022-07-11 ENCOUNTER — APPOINTMENT (RX ONLY)
Dept: URBAN - METROPOLITAN AREA CLINIC 6 | Facility: CLINIC | Age: 77
Setting detail: DERMATOLOGY
End: 2022-07-11

## 2022-07-11 DIAGNOSIS — L81.4 OTHER MELANIN HYPERPIGMENTATION: ICD-10-CM

## 2022-07-11 DIAGNOSIS — D18.0 HEMANGIOMA: ICD-10-CM

## 2022-07-11 DIAGNOSIS — L82.1 OTHER SEBORRHEIC KERATOSIS: ICD-10-CM

## 2022-07-11 DIAGNOSIS — D22 MELANOCYTIC NEVI: ICD-10-CM

## 2022-07-11 DIAGNOSIS — Z85.828 PERSONAL HISTORY OF OTHER MALIGNANT NEOPLASM OF SKIN: ICD-10-CM | Status: STABLE

## 2022-07-11 DIAGNOSIS — L57.0 ACTINIC KERATOSIS: ICD-10-CM

## 2022-07-11 PROBLEM — D22.5 MELANOCYTIC NEVI OF TRUNK: Status: ACTIVE | Noted: 2022-07-11

## 2022-07-11 PROBLEM — D18.01 HEMANGIOMA OF SKIN AND SUBCUTANEOUS TISSUE: Status: ACTIVE | Noted: 2022-07-11

## 2022-07-11 PROCEDURE — ? LIQUID NITROGEN

## 2022-07-11 PROCEDURE — 99213 OFFICE O/P EST LOW 20 MIN: CPT | Mod: 25

## 2022-07-11 PROCEDURE — ? ADDITIONAL NOTES

## 2022-07-11 PROCEDURE — 17003 DESTRUCT PREMALG LES 2-14: CPT

## 2022-07-11 PROCEDURE — 17000 DESTRUCT PREMALG LESION: CPT

## 2022-07-11 PROCEDURE — ? COUNSELING

## 2022-07-11 ASSESSMENT — LOCATION DETAILED DESCRIPTION DERM
LOCATION DETAILED: RIGHT SUPERIOR PARIETAL SCALP
LOCATION DETAILED: STERNUM
LOCATION DETAILED: LEFT ULNAR DORSAL HAND
LOCATION DETAILED: POSTERIOR MID-PARIETAL SCALP
LOCATION DETAILED: EPIGASTRIC SKIN
LOCATION DETAILED: RIGHT INFERIOR FOREHEAD
LOCATION DETAILED: LEFT NASAL DORSUM
LOCATION DETAILED: LEFT MEDIAL FRONTAL SCALP
LOCATION DETAILED: LEFT SUPERIOR FOREHEAD
LOCATION DETAILED: LEFT SUPERIOR OCCIPITAL SCALP
LOCATION DETAILED: PERIUMBILICAL SKIN
LOCATION DETAILED: LEFT SUPERIOR PARIETAL SCALP
LOCATION DETAILED: NASAL SUPRATIP
LOCATION DETAILED: RIGHT RADIAL DORSAL HAND
LOCATION DETAILED: SUPERIOR THORACIC SPINE

## 2022-07-11 ASSESSMENT — LOCATION SIMPLE DESCRIPTION DERM
LOCATION SIMPLE: LEFT OCCIPITAL SCALP
LOCATION SIMPLE: UPPER BACK
LOCATION SIMPLE: POSTERIOR SCALP
LOCATION SIMPLE: SCALP
LOCATION SIMPLE: LEFT HAND
LOCATION SIMPLE: ABDOMEN
LOCATION SIMPLE: RIGHT FOREHEAD
LOCATION SIMPLE: LEFT FOREHEAD
LOCATION SIMPLE: LEFT SCALP
LOCATION SIMPLE: NOSE
LOCATION SIMPLE: RIGHT HAND
LOCATION SIMPLE: CHEST

## 2022-07-11 ASSESSMENT — LOCATION ZONE DERM
LOCATION ZONE: TRUNK
LOCATION ZONE: NOSE
LOCATION ZONE: HAND
LOCATION ZONE: FACE
LOCATION ZONE: SCALP

## 2022-07-11 NOTE — PROCEDURE: ADDITIONAL NOTES
Render Risk Assessment In Note?: no
Detail Level: Simple
Additional Notes: Patient states lost 20+ lbs over last couple years, 3-5 pounds in last couple months, w/u thus far negative, h/o exposure to several cancer-inducing agents in [employer], PT scan ordered per patient/wife request; MRI done, pt states getting w/u for abnormalities found in liver?

## 2022-07-11 NOTE — PROCEDURE: LIQUID NITROGEN
Consent: The patient's consent was obtained including but not limited to risks of crusting, scabbing, blistering, scarring, darker or lighter pigmentary change, recurrence, incomplete removal and infection.
Show Aperture Variable?: Yes
Render Note In Bullet Format When Appropriate: No
Duration Of Freeze Thaw-Cycle (Seconds): 10
Post-Care Instructions: I reviewed with the patient in detail post-care instructions. Patient is to wear sunprotection, and avoid picking at any of the treated lesions. Pt may apply Vaseline to crusted or scabbing areas.
Number Of Freeze-Thaw Cycles: 2 freeze-thaw cycles
Detail Level: Detailed

## 2022-11-10 ENCOUNTER — PATIENT MESSAGE (OUTPATIENT)
Dept: HEALTH INFORMATION MANAGEMENT | Facility: OTHER | Age: 77
End: 2022-11-10

## 2023-01-09 ENCOUNTER — APPOINTMENT (RX ONLY)
Dept: URBAN - METROPOLITAN AREA CLINIC 6 | Facility: CLINIC | Age: 78
Setting detail: DERMATOLOGY
End: 2023-01-09

## 2023-01-09 DIAGNOSIS — B07.8 OTHER VIRAL WARTS: ICD-10-CM

## 2023-01-09 DIAGNOSIS — D18.0 HEMANGIOMA: ICD-10-CM

## 2023-01-09 DIAGNOSIS — B00.1 HERPESVIRAL VESICULAR DERMATITIS: ICD-10-CM

## 2023-01-09 DIAGNOSIS — D22 MELANOCYTIC NEVI: ICD-10-CM

## 2023-01-09 DIAGNOSIS — L81.4 OTHER MELANIN HYPERPIGMENTATION: ICD-10-CM

## 2023-01-09 DIAGNOSIS — L28.0 LICHEN SIMPLEX CHRONICUS: ICD-10-CM

## 2023-01-09 DIAGNOSIS — Z85.828 PERSONAL HISTORY OF OTHER MALIGNANT NEOPLASM OF SKIN: ICD-10-CM

## 2023-01-09 DIAGNOSIS — L82.1 OTHER SEBORRHEIC KERATOSIS: ICD-10-CM

## 2023-01-09 DIAGNOSIS — Z71.89 OTHER SPECIFIED COUNSELING: ICD-10-CM

## 2023-01-09 DIAGNOSIS — L43.2 LICHENOID DRUG REACTION: ICD-10-CM

## 2023-01-09 DIAGNOSIS — L57.0 ACTINIC KERATOSIS: ICD-10-CM

## 2023-01-09 PROBLEM — D18.01 HEMANGIOMA OF SKIN AND SUBCUTANEOUS TISSUE: Status: ACTIVE | Noted: 2023-01-09

## 2023-01-09 PROBLEM — D48.5 NEOPLASM OF UNCERTAIN BEHAVIOR OF SKIN: Status: ACTIVE | Noted: 2023-01-09

## 2023-01-09 PROBLEM — D22.5 MELANOCYTIC NEVI OF TRUNK: Status: ACTIVE | Noted: 2023-01-09

## 2023-01-09 PROBLEM — L30.9 DERMATITIS, UNSPECIFIED: Status: ACTIVE | Noted: 2023-01-09

## 2023-01-09 PROCEDURE — 17003 DESTRUCT PREMALG LES 2-14: CPT

## 2023-01-09 PROCEDURE — 11102 TANGNTL BX SKIN SINGLE LES: CPT

## 2023-01-09 PROCEDURE — ? BIOPSY BY SHAVE METHOD

## 2023-01-09 PROCEDURE — ? SUNSCREEN TREATMENT REGIMEN

## 2023-01-09 PROCEDURE — 99213 OFFICE O/P EST LOW 20 MIN: CPT | Mod: 25

## 2023-01-09 PROCEDURE — ? ADDITIONAL NOTES

## 2023-01-09 PROCEDURE — ? COUNSELING

## 2023-01-09 PROCEDURE — ? LIQUID NITROGEN

## 2023-01-09 PROCEDURE — 11103 TANGNTL BX SKIN EA SEP/ADDL: CPT

## 2023-01-09 PROCEDURE — 17000 DESTRUCT PREMALG LESION: CPT | Mod: 59

## 2023-01-09 PROCEDURE — ? SUNSCREEN RECOMMENDATIONS

## 2023-01-09 ASSESSMENT — LOCATION ZONE DERM
LOCATION ZONE: HAND
LOCATION ZONE: TOE
LOCATION ZONE: NECK
LOCATION ZONE: SCALP
LOCATION ZONE: ARM
LOCATION ZONE: LEG
LOCATION ZONE: TRUNK

## 2023-01-09 ASSESSMENT — LOCATION DETAILED DESCRIPTION DERM
LOCATION DETAILED: RIGHT SUPERIOR PARIETAL SCALP
LOCATION DETAILED: 2ND WEB SPACE RIGHT HAND
LOCATION DETAILED: LEFT MEDIAL SUPERIOR CHEST
LOCATION DETAILED: LEFT ULNAR DORSAL HAND
LOCATION DETAILED: LEFT MEDIAL INFERIOR CHEST
LOCATION DETAILED: STERNAL NOTCH
LOCATION DETAILED: LEFT DISTAL LATERAL POSTERIOR THIGH
LOCATION DETAILED: LEFT DISTAL POSTERIOR UPPER ARM
LOCATION DETAILED: EPIGASTRIC SKIN
LOCATION DETAILED: RIGHT CLAVICULAR NECK
LOCATION DETAILED: RIGHT SUPERIOR UPPER BACK
LOCATION DETAILED: RIGHT MEDIAL SUPERIOR CHEST
LOCATION DETAILED: LEFT LATERAL PLANTAR 1ST TOE
LOCATION DETAILED: STERNUM
LOCATION DETAILED: RIGHT MEDIAL INFERIOR CHEST
LOCATION DETAILED: PERIUMBILICAL SKIN
LOCATION DETAILED: LEFT SUPERIOR PARIETAL SCALP

## 2023-01-09 ASSESSMENT — LOCATION SIMPLE DESCRIPTION DERM
LOCATION SIMPLE: ABDOMEN
LOCATION SIMPLE: PLANTAR SURFACE OF LEFT 1ST TOE
LOCATION SIMPLE: CHEST
LOCATION SIMPLE: RIGHT UPPER BACK
LOCATION SIMPLE: LEFT UPPER ARM
LOCATION SIMPLE: RIGHT HAND
LOCATION SIMPLE: SCALP
LOCATION SIMPLE: LEFT POSTERIOR THIGH
LOCATION SIMPLE: LEFT HAND
LOCATION SIMPLE: RIGHT ANTERIOR NECK

## 2023-01-09 NOTE — PROCEDURE: LIQUID NITROGEN
Consent: The patient's consent was obtained including but not limited to risks of crusting, scabbing, blistering, scarring, darker or lighter pigmentary change, recurrence, incomplete removal and infection.
Duration Of Freeze Thaw-Cycle (Seconds): 10
Show Aperture Variable?: Yes
Render Note In Bullet Format When Appropriate: No
Post-Care Instructions: I reviewed with the patient in detail post-care instructions. Patient is to wear sunprotection, and avoid picking at any of the treated lesions. Pt may apply Vaseline to crusted or scabbing areas.
Number Of Freeze-Thaw Cycles: 2 freeze-thaw cycles
Detail Level: Detailed

## 2023-02-01 ENCOUNTER — SPEECH THERAPY (OUTPATIENT)
Dept: SPEECH THERAPY | Facility: OTHER | Age: 78
End: 2023-02-01
Payer: COMMERCIAL

## 2023-02-01 DIAGNOSIS — R13.10 SWALLOWING DYSFUNCTION: ICD-10-CM

## 2023-02-01 PROCEDURE — 92612 ENDOSCOPY SWALLOW (FEES) VID: CPT | Mod: GN | Performed by: SPEECH-LANGUAGE PATHOLOGIST

## 2023-02-03 NOTE — OP THERAPY EVALUATION
"  Outpatient Speech Therapy  INITIAL EVALUATION    [facility] SPEECH PATHOLOGY AND AUDIOLOGY  [address] [place] [id number]  Phone:  [phone]  Fax:  [phone]    Date of Evaluation: 02/01/2023    Patient: Laci Baugh  YOB: 1945  MRN: [id number]     Referring Provider: Kamille Hilario M.D. [address] [place]  [address]   Referring Diagnosis Parkinson's disease     Time Calculation    Start time: 1030  Stop time: 1145 Time Calculation (min): 75 minutes           Chief Complaint: Dysphagia (Progressive difficulty swallowing)    Visit Diagnoses     ICD-10-CM   1. Swallowing dysfunction  R13.10     Subjective Evaluation  Past Medical History:   Diagnosis Date    Bowel habit changes     constipation    Cataract     removed bilat    Cold 12/2018    Diabetes (HCC) 12/27/2018    \"PRE\", on oral meds    Environmental and seasonal allergies     Heart burn     Heart valve disease         Hiatus hernia syndrome     High cholesterol     History of BPH     Indigestion     Skin cancer     basal cell carcinoma & squamous cell carcinoma    Snoring     no sleep study    TIA (transient ischemic attack)     reports 1980/2005/2013     Past Surgical History:   Procedure Laterality Date    [place] UPPER GI ENDOSCOPY,REMOV F.B.  10/27/2019    Procedure: GASTROSCOPY, WITH FOREIGN BODY REMOVAL;  Surgeon: Walt García M.D.;  Location: SURGERY [facility];  Service: Gastroenterology    ECTROPIAN REPAIR Bilateral 1/16/2019    Procedure: ECTROPIAN REPAIR- LOWER LID WITH LATERAL TARSAL STRIPS AND MIDFACE ADVANCEMENT FLAPS;  Surgeon: Ethan Goldstein M.D.;  Location: SURGERY SAME DAY [facility];  Service: Ophthalmology    OTHER SURGICAL PROCEDURE  1985/2014    skin cancer removal     Speech Therapy Objective  Background Information:    Mr. Baugh is a 77 year old male referred by Kamille Prince M.D. to the [facility]'s Speech and Hearing clinic " "on February 1st 2022 for a swallow study. The client has primary diagnoses of Parkinsonism ([facility]) (G20), Tremor (R25.1), and Imbalance Non-Orthopedic (R26.89). Per 11/16/22 report from the [facility]-Department of Neurology, the client experienced a progression of his swallowing issues and complained of issues with drooling for the previous 18 months. The report stated evidence of masked facies with hypophonia, drooling, rest and postural tremor with bradykinesia. Dr. Prince stated, \"It is unclear if his dysphagia is parkinsonian or multifactorial from the history of cervical myelopathy and surgery. It was observed that the patient may have limb weakness contributing to bradykinesia.\" The patient's symptoms are said to have started in 1993 when he noted some hesitation in the left hand. He stated that over time he began to notice trouble swallowing. He complained his handwriting is shakier, and that things have significantly worsened in the last 5 years. He stated that his left-hand tremors have remained mild, but the right hand is significantly worse. He complains of a resting tremor, as well as a typical essential tremor. Mr. Baugh complains his voice is hoarse. He has had no change in sense of smell or taste. He reported his gait is slower and more unsteady than it used to be.     Family History: No family history on file    Medical History: Hypogonadism, complicated UTI (urinary tract infection), allergic rhinitis, uncomplicated unspecified asthma, hyperglycemia, hyponatremia, confusion, HTN (hypertension), HLD (hyperlipidemia), chest pressure, BPH (benign prostatic hyperplasia), DM2 (diabetes mellitus, type 2) ([facility]).    Environmental History: Patient was stationed at [employer] from [address]34[address].     Medication:    I. Allergy Sinus Medication:  -Levocetirizine 5 Mg. AM per day  -Pseudoephedrine 60 mg AM as needed  -Olopatadine eye drops 0.1% as needed  -Fluticasone propionate spray- two times per " "day    II. Other Prescription medication    -CARB/LEVO tbas- 25-100MG 3 times per day  -Lipitor generic 40 mg in AM per day  -X-Metformin 750 mg in AM per day - STOPPED 11/3/22 per Dr INDIRA Andrea    Chief Complaint: Progressive difficulty swallowing    Swallow Status: Patient currently eating regular diet (FOIS 7) with thin liquids, eats independently     Avoided Foods: Peanuts and Shellfish    Oral-Motor / Cranial Nerve Evaluation    (Scale: 0= not impaired and 3= severely impaired)    C. DYSPHAGIA ASSESSMENT    Oral-Motor / Cranial Nerve Evaluation    (Scale: 0= not impaired and 3= severely impaired)    Oral Structure: 1      Jaw (CN V) (Scale: 0= not impaired and 3= severely impaired)    Opens & closes jaw: 0    Opens & closes jaw with mild pressure: 0    Ability to open mouth adequately: 2    Ability to rapidly open and close the mouth: 1    Ability to lateralize the jaw: 1    Labial Function (CN VII) (Scale: 0= not impaired and 3= severely impaired)    Lip closure at rest: 1    Holds air in cheeks: 1    Protrusion - Pucker lips: 1    Retraction - Smile: 1    Rapid protrusion/retraction: 1    Lip round /u/: 1    Lip smackin    Rapid Closure (\"puh, puh, puh\" ): 1    Strength: 1    Sensitivity (upper & lower right, upper & lower left): 0     Lingual Function (CN XII) (Scale: 0= not impaired and 3= severely impaired)    Protrusion: 1    Lick Lips: 1    Lateralization to corners - Left & Right: 1    Lateralization to buccal cavities - Left & Right: 1    Rapid left/right lateralization: 1    Tip Depression (To floor of mouth & to lower lip): 0    Tip Elevation (To hard palate): 0    Repetitive elevation of back /meenu/: 1    Repetitive elevation of tip /tututu/: 1    Retraction: 1    Strength    Tip against tongue depressor: 1    Left side of tongue against tongue depressor: 0     Right side of tongue against tongue depressor: 0     Elevated blade against tongue depressor: 0    Tongue in left cheek against finger " resistance: 0    Tongue in right cheek against finger resistance: 0    Touch Sensitivity    Left Anterior Third: 0    Left Middle Third: 0    Left Posterior Third: 0    Soft Palate (CN IX) (Scale: 0= not impaired and 3= severely impaired)    Deviation from midline: 0    Ability to raise palate: 0    Ability to sequentially raise and lower palate: 0    Touch Sensitivity - Palatal Gag Reflex: 0    Resonance: good    Cheeks (CN V) (Scale: 0= not impaired and 3= severely impaired)    Facial Symmetry (at rest): 1    Ability to symmetrically puff cheeks: 0    Oral Mucosa: 3 xerostomia    Rating (0= adequate and 3= xerostomia)    Oral mucosa:    Laryngeal Performance: adequate     Tracheostomy Tube: no    Vocal Quality: mildly hoarse, severe intensity deficits    Volitional Cough: weak 1/3    Throat Clearin/3    Phonation Time (# of seconds prolonged /a/): (Norms: 15-25 seconds females, 25-35 seconds males)    Respiratory Status: thoracic but functional     Patient can hold breath for (seconds): DNT    Swallowing Questionnaire       1. Do you have any problems with swallowing:   Yes  If yes, when did the problem start?     Circa , at times it is hard to swallow.   2. Did the start of your swallowing problems relate to other medical problems you have?    Yes.  If yes, please describe:     Parkinson disease DX.   3. When you eat or drink, do you have episodes of coughing?  Yes.  When you eat or drink, do you have episodes of choking?  Yes.    4. Do you wear dentures when you eat?     No.   5. Does food or drink ever go down the wrong way?    No.  6. Does your food generally require special preparation before you can eat it?    Yes. Allergies.  7. Do you avoid certain foods because they are difficult to swallow?     If yes, please list examples:    Yes. Peanuts and shellfish.  8. Do you find food in your mouth after you swallow?   No.    9. Do you have difficulty keeping food or drink in your mouth?    Yes.   10. Do  liquids ever come back through your nose when you swallow them?    Yes.   11. Do you ever feel that food gets 'stuck' in your throat?    Yes. Food feels stuck in throat.   12. Do you regularly wake up at night coughing?  Yes.     13. Do you often wake up with a bad/sour taste in your mouth?   Yes.    14. Is your swallowing problem intermittent/constant?      15. Has your swallowing problem changed over time?   If yes, please describe:   Yes, has gotten worse over time.    16. Are there any factors that make your swallowing problem worse?   Yes.   If yes, please describe:    Toxic chemicals in [address] [employer].   17. Do you have more difficulty swallowing when in any certain position?   Yes.   If yes, please describe:   When reclining on the couch.      Eating Assessment Tool (EAT-10)   Weight:   Swallowing Problem Ratings (Patient-Scored as: 0 (No problem), 1, 2, 3, or 4 (Severe problem))  If the EAT-10 score is 3 or higher, you may have problems swallowing efficiently and safely.   1. My swallowing problem has caused me to lose weight: 3  2. My swallowing problem interferes with my ability to go out for meals: 3  3. Swallowing liquids takes extra efforts: 3  4. Swallowing solids takes extra effort: 4   5. Swallowing pills takes extra effort: 3  6. Swallowing is painful: 1   7. The pleasure of eating is affected by my swallowing: 3  8. Food sticks in my throat when I swallow: 3  9. I cough when I eat: 3  10. Swallowing is stressful:  3       Total EAT-10: 29  (Cara, 2008)    Patient Perception of Swallowing:  How Patient rates their ability to swallow (0 = No trouble swallowing   10 = Cannot swallow at all): 6.1- moderate       FEES Evaluation:    Endoscopic Comments      Procedure Type: FEES     Velopharyngeal Appearance: Erythema of PPW, coronal closure    Velopharyngeal Function:  functional    Hypopharyngeal Appearance: Structures appear sarcopenic. There is a larger volume in the pharyngeal lumen,  therefor anticipating difficulty protecting the airway. Increased volume in the pharyngeal lumen. Thick copious yellow secretions mainly in lateral channels and right vallecular space of pyriform.    -Pharyngeal Contraction:  Pharyngeal/laryngeal tremor, weak pharyngeal squeeze with large teaspoon of apple sauce, weak pharyngeal squeeze    Pharyngeal Shortening: functional    -Base of Tongue/Lingual Tonsil Appearance:  Present, but transiently loses the bolus over the apex of the epiglottis into the pharynx before he starts to initiate swallow    Base of Tongue Movement: reduced tongue base retraction    Epiglottic Appearance: flat and functional    Epiglottic Movement:  inverts to horizontal (not seen to full retroflexion-suspect reduced hyoid excursion)    Laryngeal Vestibule Appearance: On phonation, the patient has full glottal opening with full approximation,   decreased medial compression, air escaped through vocal folds during speech, laryngeal tremor    Bilateral Vocal Fold Appearance-the vocal folds are thinned bilaterally, LT VF thinning due to age: presbylarynx  Bowing of LT VF  VF appear sarcopenic suspect loss of composition with age  Sarcopenia is causing significant glottal gap during speech resulting in breathy, low-intensity voicing  Ventricular Fold Appearance:  functional       Appearance of Secretion:  some thick and yellow, some thin     Management of Secretions:  drooling anteriorly, not seen in posterior at this time    Nasopharynx and Hypopharynx prior to evaluation: 9:39      TN0: Thin: 3 trials - (5 ml, 10 ml, 15 ml):    Volume Bolus loss   trigger   delay Trigger local  BOT  V  P  LV PAS   Vallecular  Residue  TB  V  E-high      Pharyngeal  residue  LLC  RLC  PPW  LPW  P, PCS Vestibule  residue IAS   AEF, A, AC  E-low  CS   Penetration  Aspiration timing  Before  During   After  B/t BD Penetration  Aspiration  Locale  AC, E  PC, AEF  RTVF  LTVF  Glottis, LV # Cleanse/PAS Evidence of reflux    Small   Y N V 2 V 1/3 (mainly on left) P 3/3  N N N N      medium Y N V N V 2/3 P 2/3 N N N #1/PAS 1   P 1/3   #2/PAS 1  V 1/3   Clears P residue     large Y N P 2 V right PCS 1/3 N N N PPW 1/3   PCS 1/3   IAS 1/3     Contributing Factors: bolus loss, reduced tongue based retraction, delayed initiation, timing  Sensation:  Comments: no anticipatory closure prior to bolus arrival to the pharynx, good laryngeal elevation     MT2: Mildly Thick: 3 trials - (5 ml, 10 ml, 15 ml):    Volume Bolus loss   trigger   delay Trigger local  BOT  V  P  LV PAS   Vallecular  Residue  TB  V  E-high      Pharyngeal  residue  LLC  RLC  PPW  LPW  P, PCS Vestibule  residue IAS   AEF, A, AC  E-low  CS   Penetration  Aspiration timing  Before  During   After  B/t BD Penetration  Aspiration  Locale  AC, E  PC, AEF  RTVF  LTVF  Glottis, LV # Cleanse/PAS Evidence of reflux   Small   Y N P 3 V 1/3 PCS 1/3   RP 2/3 IAS 1/3  After IAS N N   medium N N VS 3 N P 1/3  IAS 1/3  During IAS  N N   large N N V 1 clear LP 1/3 N N N N N   Contributing Factors:  Sensation:  Comments: anticipatory vocal fold closure     P4-2: Puree: 2 trials - (1/2 tsp., heaping tsp.):   Volume Bolus loss   trigger   delay Trigger local  BOT  V  P  LV PAS   Vallecular  Residue  TB  V  E-high      Pharyngeal  residue  LLC  RLC  PPW  LPW  P, PCS Vestibule  residue IAS   AEF, A, AC  E-low  CS   Penetration  Aspiration timing  Before  During   After  B/t BD Penetration  Aspiration  Locale  AC, E  PC, AEF  RTVF  LTVF  Glottis, LV # Cleanse/PAS Evidence of reflux   Small N N V  1 V 2/3 PCS 1/3  2/3 left lateral channels N N N N N   large N N V 1 V 3/3 PPW 1/3  LC 2/3  PCS 1/3   Bilateral right P 1/3  N N N N N   Contributing Factors: Large bolus PPW reduced pharyngeal squeeze  Sensation:  Comments: With large teaspoon, good pharyngeal elevation, weak pharyngeal squeeze, tongue based retraction issue.     SB6-1: 1 & 2 peaches with Liquid (mixed consistency) 2 trials:   Volume  Bolus loss   trigger   delay Trigger local  BOT  V  P  LV PAS   Vallecular  Residue  TB  V  E-high      Pharyngeal  residue  LLC  RLC  PPW  LPW  P, PCS Vestibule  residue IAS   AEF, A, AC  E-low  CS   Penetration  Aspiration timing  Before  During   After  B/t BD Penetration  Aspiration  Locale  AC, E  PC, AEF  RTVF  LTVF  Glottis, LV # Cleanse/PAS Evidence of reflux   Small N N V 3 V 3/3 Pyriform bilateral left side 3/3  PPW 3/3  PCS 1/3   RP 1/3  LP 2/3 GS 1/3    During G #1/ PAS 3  #2/ PAS 5  V 1/3   2/3  Delayed trigger   Pyriform residue on left 3/3   1/3 right  2/3 bilateral   During  After   TVF N   large N N V 3 VS 1/3   PCS 3/3  Encroaching airway  P 1/3 bilaterally    IAS  IAS  During   N #1/PAS   Clears residue  P 1/3  V 1/3    Airway cleared     Contributing Factors: Laryngeal tremor allows residue to encroach airway between bolus deliveries   Sensation:  Comments: Cleanse swallow did not effect residue. During bolus delivery, because of laryngeal tremor the bolus encroaches airway. Large volume: liquid head of the bolus enters the pyriform sinus; however, the peaches were delayed in the vallecular space more than 2 seconds. Silent aspiration during bolus delivery PAS 8. Pieced meal bolus which resulted in delay on small volume of peaches.       RG7: Cookie - 1 trial:   Volume Bolus loss   trigger   delay Trigger local  BOT  V  P  LV PAS   Vallecular  Residue  TB  V  E-high      Pharyngeal  residue  LLC  RLC  PPW  LPW  P, PCS Vestibule  residue IAS   AEF, A, AC  E-low  CS   Penetration  Aspiration timing  Before  During   After  B/t BD Penetration  Aspiration  Locale  AC, E  PC, AEF  RTVF  LTVF  Glottis, LV # Cleanse/PAS Evidence of reflux   Whole cookie N N BOT 1 3/3 3/3 lateral channles bilaterally     PCS 1/3     RP 2/3  N penetration N N N   Contributing Factors:  Sensation:  Comments: Swallows without liquid wash needed.      TN0: 90 cc water challenge: Fail   Volume Bolus loss   trigger   delay  Trigger local  BOT  V  P  LV PAS   Vallecular  Residue  TB  V  E-high      Pharyngeal  residue  LLC  RLC  PPW  LPW  P, PCS Vestibule  residue IAS   AEF, A, AC  E-low  CS   Penetration  Aspiration timing  Before  During   After  B/t BD Penetration  Aspiration  Locale  AC, E  PC, AEF  RTVF  LTVF  Glottis, LV # Cleanse/PAS Evidence of reflux   90 cc    8  PCS 3  penetration  #1/PAS 8    Pill with water DNT             Contributing Factors:  Sensation:  Comments: Fail, stops shelter through the 90 cc trial. Silent aspiration observed-PAS 8.     Airway protection:   Good Functional-Consistent fair-Transient Poor     Swallow efficiency:   Good Functional-Consistent Fair-Transient Poor     Total DIGEST SCORE:  2-moderate   Safety:  2   Efficiency:  2   Total score: 2; moderate.   (Starmer, 2021)    Assessments: Patient presents with a moderate oropharyngeal dysphagia with characteristics of bolus loss prior to swallow initiation, weak tongue based retraction, reduced pharyngeal squeeze, and reduced airway protection due to laryngeal tremor that allows encroachment of residue into airway between bolus deliveries and after the primary and cleanse swallows. Aspiration events occurred after primary swallows and were silent. Penetration was silent with a delayed cleanse swallow on most consistencies. Patient mechanics are more timely with larger volumes rather than small volumes. Patient is a good candidate for exercised-based therapy. He is a good candidate for voice therapy to address decreased intensity and suggest some exercises to impact swallow performance through the concept of neuroplastic transference.     Patient Instructions: return for therapy    Speech Therapy Plan :   Prognosis & Recommendations  Impression Summary:  Patient presents with a moderate oropharyngeal dysphagia with characteristics of bolus loss, weak tongue based retraction, reduced pharyngeal squeeze, and reduced airway protection due to laryngeal  tremor that allows encroachment of residue into airway between bolus deliveries and after the primary and cleanse swallows. Aspiration events occurred after primary swallows and were silent. Penetration was silent with a delayed cleanse swallow on most consistencies. Patient mechanics are more timely with larger volumes rather than small volumes. Patient is a good candidate for exercised-based therapy. He is a good candidate for voice therapy to address decreased intensity and suggest some exercises to impact swallow performance through the concept of neuroplastic transference.     Prognosis:  Good  Prognosis Details:  Typical PD symptoms  Diet Recommendation:  Normal Consistency  Liquid Recommendation:  Thin  Medication Administration:  As tolerated  Goals  Short Term Goals:  Increase Intensity   Facial Expressions  Intelligibility  Hypokinetic dysarthria  SPEAKOUT!  Voluntary laryngeal vestibule closure  Tongue based retraction  Effortful swallows-TBR and pharyngeal squeeze  EMST/Peak flow-LVC  Short Term Goal Duration (Weeks):  6-8 weeks  Long Term Goals:  Patient will improve swallow efficiency and airway protection with all volumes and consistencies in all environments.   Long Term Goal Duration (Weeks):  2-4 months  Potential barriers to Goal Achievement:  None    Functional Assessment Used: FEES and Clinical Swallow assessment   Scales and Abbreviations  Penetration/Aspiration Scale “PAS” (1-8)  1 - No penetration or aspiration; 2 - some portion of the bolus enters the airway, remain above the vocal folds, and is ejectived from the airway (not seen in the airway at the end of the swallow); 3 - contrast enters the airway, remains above the vocal folds, and is not ejected from the airway (is seen inside the airway after the swallow); 4 - contrast enters the airway, contracts the vocal folds, and is ejected from the airway; 5 - contrast enters the airway, contacts the vocal folds, and is not ejected from the  airway, PAS 7 contrast enters the airway. Crosses the plane of the vocal folds, and is not ejected from the airway despite effort, PAS 8 contrast enters the airway crosses the plane of the vocal folds, is not ejected from the airway and there is no response to aspiration.    Valleculae/Pyriform Sinus Residue Scale “VPSRS” (0-3)  0 - No residue; 1 - Coating of cavity; 2 - Partial filling of cavity; 3 - Near to complete filling of cavity  Reflux Scale “RS” (0-3)  0 - No reflux; 1 - Bubbles to traces; 2 - Pyriform sinus pooling without aspiration; 3 - Pyriform sinus pooling with aspiration  Abbreviations  DNT - Did Not Test; B - Bilateral; R - Right; L - Left; BOT - Base of Tongue; V - Vallecular Space; P - Pyriform Sinus; PCS - Post-cricoid space; IAS - Inter-arytenoid space; TB - Tongue Base; E-high - epiglottis high; E-low - epiglottis low; LC -lateral channel; PPW -posterior pharyngeal wall; LPW -lateral pharyngeal wall, B/t BD -between bolus delivery; AC -anterior commissure; E -epiglottis; PC -posterior commissure of glottis; AEF -aryepiglottic fold; TVF -true vocal fold; VF -ventricular fold; LV -Laryngeal vestibule    Referring provider co-signature:  I have reviewed this plan of care and my co-signature certifies the need for services.    Certification Period: 02/01/2023 to  05/04/23    Physician Signature: ________________________________ Date: ______________

## 2023-03-07 ENCOUNTER — SPEECH THERAPY (OUTPATIENT)
Dept: SPEECH THERAPY | Facility: OTHER | Age: 78
End: 2023-03-07
Payer: COMMERCIAL

## 2023-03-07 DIAGNOSIS — R49.9 VOICE DISORDER: ICD-10-CM

## 2023-03-07 PROCEDURE — 92507 TX SP LANG VOICE COMM INDIV: CPT | Mod: GN,GC | Performed by: SPEECH-LANGUAGE PATHOLOGIST

## 2023-03-28 ENCOUNTER — SPEECH THERAPY (OUTPATIENT)
Dept: SPEECH THERAPY | Facility: OTHER | Age: 78
End: 2023-03-28
Payer: COMMERCIAL

## 2023-03-28 DIAGNOSIS — R13.10 DYSPHAGIA, UNSPECIFIED TYPE: ICD-10-CM

## 2023-03-28 PROCEDURE — 92507 TX SP LANG VOICE COMM INDIV: CPT | Mod: GN,GC | Performed by: SPEECH-LANGUAGE PATHOLOGIST

## 2023-03-29 NOTE — OP THERAPY DAILY TREATMENT
]  Outpatient Speech Therapy  DAILY TREATMENT     [facility] SPEECH PATHOLOGY AND AUDIOLOGY  [address] [place] [address]  Phone:  [phone]  Fax:  [phone]    Date: 03/28/2023    Patient: Laci Baugh  YOB: 1945  MRN: [id number]     Time Calculation    Start time: 1500  Stop time: 1545 Time Calculation (min): 45 minutes         Chief Complaint: Speech Therapy    Visit #: 3    Subjective Evaluation  Mr. Baugh arrived to his appointment on time and was accompanied by his son. At the end of the session, he stated his son was applying for a job at [employer].   actively participated in the session, and was able to stay on task and but needed redirection every few minutes.     Speech Therapy Objective   LSVT Data    LSVT LOUD Evaluation Protocol:     Task One: Maximum Duration of Sustained Vowel Phonation : SPL Meter to Mouth Distance: 50 centimeters    Duration dBSPL   7 seconds  63 dB   7.5 seconds 54-73 dB   5 seconds 77-85 dB   3 seconds 68-84 dB   4 seconds 66-88 dB   4 seconds  73-82 dB        Task Two: Maximum Fundamental Frequency Range  Trial 1: 147 Hz   Trial 2: 143 Hz   Trial 3: 148 Hz   Trial 4: 146 Hz   Trial 5: 144 Hz    Trial 5: 147 Hz      Highest Pitch: 148 Hz   Pitch Range: 143 Hz-148 Hz    Task Two Part Two: Lowest Fundamental Frequency Range   Trial 1: 102 Hz   Trial 2: 100 Hz   Trial 3: 126 Hz    Trial 4: 139 Hz   Trial 5: 133 Hz      Lowest Pitch: 100 Hz    Pitch Range: 139 Hz    Task Three: Reading of the rainbow passage: Sound Level Meter to Mouth Distance: 50 centimeters     During read tasking of the rainbow passage the client SPL ranged from 50-82 dB. His pitch variability was limited, and he has a slow speech rate. His prosody did not change throughout reading and he did not stress syllables when reading. An articulation rating scale will be implemented into the next therapy session.     Task 4: Conversation Monologue. Sound Level Meter to Mouth  "Distance 50: centimeters.    The client spoke about his wedding for about 10 seconds, then recited [employer] [place] address and a poem. His average dB SPL was 65 db and ranged from 66-87 dB SPL.     Task 5: Generate words:  Sound Level Meter to Mouth Distance 50: centimeters  F A S   Feeble: 60 dB SPL Apple: 64 dB SPL Rubens 71dB SPL    Funny: 69 dB SPL Anniversary: 70 dB dB SPL Sad 69 dB SPL   Ford: N/A Anti pasta: 66 dB SPL Soltus 61 dB SPL   Alfie: 70 dB SPL  Adversaryy 74 dB SPL  Somber 69 dB SPL     Task 5: Describe a Motor Task while completing a Dual Motor Activity     Client was unable to complete task.     SPEAKOUT! Warm-up    Warm-up number one: [id number] with intent     Trial one: N/A   Trial two: 79 dB   Trial three: 82 dB   Trial four: 86 dB   Trial five: 86 dB      Warm-up number two: Sustained /ah/    Trial one: 73 dB   Trial two: 76 dB    Trial three: 80 dB   Trial four: 81 dB 4 seconds   Trial five: 75 dB 4 seconds          LTG 1: Client will improve his intelligibility, comprehensibility, and naturalness in conversational tasks in all speaking environments      Not addressed   Not addressed    STG. 1. Client will produce spontaneous conversation at 75 dB SPL in the clinical setting without clinician cues across three consecutive sessions   3/7/23: 53-86dB 3/7/23: 53-86dB   3/28/23: Average 65    STG 2. Client will warm up voice at 85-90 dB SPL in the clinical setting without cues across three consecutive sessions  3/28/23  1. N/A  2. 79 db  3. 82 dB  4. 86 dB  5. 86 dB   Not addressed    STG 3. Client will sustain \"ah\" for 15 seconds in the clinical setting without modulating and without cues across three consecutive sessions       SPL Meter to Mouth Distance: 50 centimeters      3/7/23  Trial 1:   duration: 7 seconds  dBSPL: 63dB     Trial 2:  Duration: 7.5 seconds  dBSPL: 54-73dB     Trial 3:   Duration: 5 seconds  dBSPL: 77-85dB     Trial 4:  Duration: 3 seconds  dBSPL: 68-84dB   " "  Trial 5:   Duration: 4 seconds  dBSPL: 66-88dB     Trial 6:   Duration: 4 seconds  dBSPL: 73-82dB     SPL Meter to Mouth Distance: 50 centimeters      3/28/23  Trial 1:   duration: N/A   dBSPL: 73dB     Trial 2:  Duration: N/A   dBSPL: 76 dB     Trial 3:   Duration:N/A  dBSPL: 80 dB      Trial 4:  Duration: 4 seconds  dBSPL: 81 dB      Trial 5:   Duration: 4 seconds  dBSPL: 74 dB     STG 4. Client will produce vocal glides at 75-85 dB SPL in the clinical setting without cues across three consecutive sessions  Not addressed  Not addressed    STG 5. Client will produce vocal glides with good clear vocal quality 15/15 trials in the clinical setting without cues across three consecutive sessions  Not addressed  Not addressed    STG 6. Client will recite numerical sequences at 85 dB SPL in the clinical setting without cues across three consecutive sessions  Not addressed  Not addressed    STG 7. Client will complete simple then complex cognitive-linguistic tasks at 75 dB SPL spontaneously and without cues in the clinical setting across three consecutive sessions   3/7/23: Average dBSPL range: 53-86dB      Client spoke about different topics ( i.e , past jobs, and his wife) and maintained an average dBSPL range of  53-86dB  3/28/23 Average dBSPL range: 66-87     Client spoke about his wedding, son, and his hobbies.    STG 8. Client will complete daily carry-over home practice tasks 5 out of 7 days throughout the entire program without clinician cues  Not addressed  Not addressed    STG 9. Client will warm up voice with good intent by using a Likert scale (0-no intent, 10-intent), rating himself within +/- 1 of the clinician's score across three consecutive sessions  Not addressed  Not addressed    STG 10. Client will sustain \"ah\" with good intent by using a Likert scale (0-no intent, 10-intent), rating himself within +/- 1 of the clinician's score across three consecutive sessions  Not addressed  Not addressed  " "  STG 11. Client will produce vocal glides with good intent by using a Likert scale (0-no intent, 10-intent), rating himself within +/- 1 of the clinician's score across three consecutive sessions  Not addressed  Not addressed    STG 12. Client will recite numerical sequences with good intent by using a Likert scale (0-no intent, 10-intent), rating himself within +/- 1 of the clinician's score across three consecutive sessions  Not addressed  Not addressed    STG 13. Client will read phrases with good intent by using a Likert scale (0-no intent, 10-intent), rating himself within +/- 1 of the clinician's score across three consecutive sessions  Not addressed  Not addressed    STG 14. Client will complete simple then complex cognitive-linguistic tasks with good intent by using a Likert scale (0-no intent, 10-intent), rating himself within +/- 1 of the clinician's score across three consecutive sessions  Not addressed  Not addressed    STG 15. Client's spontaneous speech with be comprehensible to unfamiliar listeners rated by using a Likert scale (0-incomprehensible, 10-comprehensible), rating himself within +/- 1 of the clinician's score across three consecutive sessions   3/7/23:  Clinician ratin/10   Client rating: Not addressed  3/28/23  Clinician ratin/10    Client rating: Not addressed    STG 16. Client will produce intentful speech (85-90dbSPL) when stating functional phrases given to him throughout the session with no clinician support 5/5 time across three consecutive sessions.    Client to practice functional phrases at home and make five more phrases to practice.  Functional phrases dicussed     \"I love you\": dB range 72-83dB   1) Client ratin/10  2) Client rating: 3/10  4) Client ratin/10  5) Client ratin/10       STG 17. The client will use facial expressions that properly express his emotions with minimal clinician cueing on 8/10 attempts over five sessions.     Not Addressed  Not " "Addressed         Assessments    A modified LSVT LOUD Evaluation Protocol was used to establish baseline data for the client in regard to his speech variability, loudness, voice quality, articulation, rate, prosody, and stress. The client and clinician worked through these exercises with redirection. When faced with a dual motor exercise (conversational speech while tapping leg)  the client could not complete the task and his attention was quickly diverted from the motor task. He stated that he \"can only focus on one task at a time\".     The SPEAKOUT! program is modified to improve intensity, naturalness, intelligibility and comprehensibility of Mr. Amauri shanks. SPEAKOUT! lesson one was started and the client worked though the warm- ups with maximum clinician support. The client has minimal facial expressions when talking, and lacks prosody and stress in speech. He was slightly resistive to comply with tasks, but complied with model. He perseverates on  verbiage and vernacular. When asked to participate in conversation about his wedding, the description was brief, and then deflected to reciting the [place] Address.     Speech Therapy Plan    -Continue SPEAKOUT! lesson one  -Include exercises that include motor tasks while doing SPEAKOUT! to add cognitive load.       Plan:  Implement SPEAK OUT!     Speech Therapy Plan :       Prognosis:  Good      Goals      Short Term Goals:  Patient to begin progressing on established goals      Short Term Goal Duration (Weeks):  2-4 weeks      Long Term Goal Duration (Weeks):  2-4 weeks      Potential barriers to Goal Achievement: Difficulty maintaining attention to therapy and goals as stated above.      Therapy Recommendations      Recommendation:  Individual Speech Therapy      Planned Therapy Interventions:  Voice training      Plan Details:  Begin to work on:      -Improve voice quality while reading sentences/phrases to increase intelligibility and " naturalness      -Begin lesson one of the SPEAK OUT! program      Frequency: 1x week     [x] As the licensed therapist supervising this student, I was present during the entire treatment session directing the care and reviewing the assessment plan.  I reviewed all documentation prior to signing.

## 2023-04-04 ENCOUNTER — SPEECH THERAPY (OUTPATIENT)
Dept: SPEECH THERAPY | Facility: OTHER | Age: 78
End: 2023-04-04
Payer: COMMERCIAL

## 2023-04-04 DIAGNOSIS — R49.9 VOICE DISORDER: ICD-10-CM

## 2023-04-04 PROCEDURE — 92507 TX SP LANG VOICE COMM INDIV: CPT | Mod: GN | Performed by: SPEECH-LANGUAGE PATHOLOGIST

## 2023-04-05 NOTE — OP THERAPY PROGRESS SUMMARY
"  Outpatient Speech Therapy  PROGRESS SUMMARY NOTE      [facility] SPEECH PATHOLOGY AND AUDIOLOGY  [address] [facility] [address]  Phone:  [phone]  Fax:  [phone]    Date of Visit: 04/04/2023    Patient: Laci Bauhg  YOB: 1945  MRN: [id number]     Referring Provider: Kamille Hilario M.D. 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 Baugh arrived to his appointment on time. He told his clinician his son was offered a job at [employer]. Mr. Baugh actively participated in the session but would often stray from the task at hand and needed maximum clinician support throughout the session.     Speech Therapy Objective    Continue Lesson One of SPEAKOUT!     Goals: Baseline Data: Todays Data:    LTG 1: Client will improve his intelligibility, comprehensibility, and naturalness in conversational tasks in all speaking environments     -  -    STG. 1. Client will produce spontaneous conversation at 75 dB SPL in the clinical setting without clinician cues across three consecutive sessions   3/7/23: 53-86dB 3/7/23: 53-86dB   3/28/23: Average 65    STG 2. Client will warm up voice at 85-90 dB SPL in the clinical setting without cues across three consecutive sessions  3/28/23  1. N/A  2. 79 dB  3. 82 dB  4. 86 dB  5. 86 dB   4/4/2023  70dB  80 dB  88dB  80dB  89dB   STG 3. Client will sustain \"ah\" for 15 seconds in the clinical setting without modulating and without cues across three consecutive sessions       SPL Meter to Mouth Distance: 50 centimeters      3/7/23  Trial 1:   duration: 7 seconds  dBSPL: 63dB     Trial 2:  Duration: 7.5 " seconds  dBSPL: 54-73dB     Trial 3:   Duration: 5 seconds  dBSPL: 77-85dB     Trial 4:  Duration: 3 seconds  dBSPL: 68-84dB     Trial 5:   Duration: 4 seconds  dBSPL: 66-88dB     Trial 6:   Duration: 4 seconds  dBSPL: 73-82dB     SPL Meter to Mouth Distance: 50 centimeters      3/28/23  Trial 1:   duration: N/A   dBSPL: 73dB     Trial 2:  Duration: N/A   dBSPL: 76 dB     Trial 3:   Duration:N/A  dBSPL: 80 dB      Trial 4:  Duration: 4 seconds  dBSPL: 81 dB      Trial 5:   Duration: 4 seconds  dBSPL: 74 dB     4/4/23  Trial 1: 80dB  Trial 2: 72 dB  Trial 3: 75 dB  Trial 4: 85dB  Trial 5: 80dB    STG 4. Client will produce vocal glides at 75-85 dB SPL in the clinical setting without cues across three consecutive sessions  4/4/23  Glide-up:  T1: 70-80dB  T2: 80dB  T3: 80-86dB  T4:78-82dB  T5:77-84dB    Glide-down  T1: 70-64dB  T2: 82-63dB  T3: 72-62dB  T4:82dB  T5: 86dB 4/4/23  Glide-up:  T1: 70-80dB  T2: 80dB  T3: 80-86dB  T4:78-82dB  T5:77-84dB    Glide-down  T1: 70-64dB  T2: 82-63dB  T3: 72-62dB  T4:82dB  T5: 86dB   STG 5. Client will produce vocal glides with good clear vocal quality 15/15 trials in the clinical setting without cues across three consecutive sessions  4/4/23   Not met.  4/4/23  Not met.    STG 6. Client will recite numerical sequences at 85 dB SPL in the clinical setting without cues across three consecutive sessions  4/4/23  dB range: 70-89dB  4/4/23  dB range: 70-89dB    STG 7. Client will complete simple then complex cognitive-linguistic tasks at 75 dB SPL spontaneously and without cues in the clinical setting across three consecutive sessions   3/7/23: Average dBSPL range: 53-86dB      Client spoke about different topics ( i.e , past jobs, and his wife) and maintained an average dBSPL range of  53-86dB  Not addressed     STG 8. Client will complete daily carry-over home practice tasks 5 out of 7 days throughout the entire program without clinician cues  Client stated to have practiced  "carryover tasks. Client was given new carryover tasks to do at home on top of the functional phrases he had already been given.    STG 9. Client will warm up voice with good intent by using a Likert scale (0-no intent, 10-intent), rating himself within +/- 1 of the clinician's score across three consecutive sessions  Not addressed  Not addressed    STG 10. Client will sustain \"ah\" with good intent by using a Likert scale (0-no intent, 10-intent), rating himself within +/- 1 of the clinician's score across three consecutive sessions  Not addressed  Not addressed    STG 11. Client will produce vocal glides with good intent by using a Likert scale (0-no intent, 10-intent), rating himself within +/- 1 of the clinician's score across three consecutive sessions  4/4/23  Glide high: Likert scale 1/5  NA  2. Client 4/5      Clinician 4/5    3.Client 4/5     Clinician 4/5    4.Client 4/5     Clinician 3/5    5. Client 2/5      Clinician 3/5     [place] low: Likert scale 1/5  Client 3/5        Clinician 3/5    2.Client 3/5     Clinician 2/5    3.Client 2/5      Clinician 1/5    4.Client -      Clinician 2/5    5.Client 3/5      Clinician 4/5 4/4/23  Glide high: Likert scale 1/5  NA  2. Client 4/5      Clinician 4/5    3.Client 4/5     Clinician 4/5    4.Client 4/5     Clinician 3/5    5. Client 2/5      Clinician 3/5     [place] low: Likert scale 1/5  Client 3/5        Clinician 3/5    2.Client 3/5     Clinician 2/5    3.Client 2/5      Clinician 1/5    4.Client -      Clinician 2/5    5.Client 3/5      Clinician 4/5   STG 12. Client will recite numerical sequences with good intent by using a Likert scale (0-no intent, 10-intent), rating himself within +/- 1 of the clinician's score across three consecutive sessions  Not addressed  Not addressed    STG 13. Client will read phrases with good intent by using a Likert scale (0-no intent, 10-intent), rating himself within +/- 1 of the clinician's score across three consecutive " sessions  Not addressed  Not addressed    STG 14. Client will complete simple then complex cognitive-linguistic tasks with good intent by using a Likert scale (0-no intent, 10-intent), rating himself within +/- 1 of the clinician's score across three consecutive sessions  Not addressed  Not addressed    STG 15. Client's spontaneous speech with be comprehensible to unfamiliar listeners rated by using a Likert scale (0-incomprehensible, 10-comprehensible), rating himself within +/- 1 of the clinician's score across three consecutive sessions   3/7/23:  Clinician ratin/10   Client rating: Not addressed  23  Clinician ratin/10   Client rating: Not addressed    STG 16. Client will produce intentful speech (85-90dbSPL) when stating functional phrases given to him throughout the session with no clinician support 5/5 time across three consecutive sessions.    Client to practice functional phrases at home and make five more phrases to practice.        STG 17. The client will use facial expressions that properly express his emotions with minimal clinician cueing on 8/10 attempts over five sessions.     23  0/10 1/10 attempts where client expressed facial expression without cueing.         Assessments    The SPEAKOUT! program was modified to improve the intensity, naturalness, intelligibility, and comprehensibility of Mr. Baugh's speech. Mr. Baugh worked through the warm-ups with maximum clinician support. Mr. Baugh facial expressions when talking improved minimally. His speech continued to lack prosody and stress. He used bigger eyes when speaking and stayed on task for longer durations and had shorter digressions. He was slightly resistant to comply with tasks and often stated that he “changes his voice when talking with women vs men” and that “people with Parkinson's do not speak loudly”. He continued to perseverate on  verbiage and vernacular. Mr. Baugh needs reminders of the goals and  potential outcomes of the program.    Speech Therapy Plan    -Continue SPEAKOUT! lesson one     -Include exercises that include motor tasks while doing SPEAKOUT! to add cognitive load     -Include focus on facial expressions          Functional Assessment Used         Plan: Implement SPEAK OUT!     Speech Therapy Plan :       Prognosis:  Good      Goals    Short Term Goals:  Patient to begin progressing on established goals      Short Term Goal Duration (Weeks):  2-4 weeks      Long Term Goal Duration (Weeks):  2-4 weeks      Potential barriers to Goal Achievement: Difficulty maintaining attention to therapy and goals.     Therapy Recommendations       Recommendation:  Individual Speech Therapy      Planned Therapy Interventions:  Voice training        Frequency: 1x week               Referring provider co-signature:  I have reviewed this plan of care and my co-signature certifies the need for services.    Certification Period: 04/04/2023 to 07/05/23    Physician Signature: ________________________________ Date: ______________

## 2023-04-11 ENCOUNTER — SPEECH THERAPY (OUTPATIENT)
Dept: SPEECH THERAPY | Facility: OTHER | Age: 78
End: 2023-04-11
Payer: COMMERCIAL

## 2023-04-11 DIAGNOSIS — G20.A1 PARKINSON'S DISEASE (HCC): ICD-10-CM

## 2023-04-11 PROCEDURE — 92507 TX SP LANG VOICE COMM INDIV: CPT | Mod: GN,GC | Performed by: SPEECH-LANGUAGE PATHOLOGIST

## 2023-04-12 NOTE — OP THERAPY DAILY TREATMENT
"  Outpatient Speech Therapy  DAILY TREATMENT     [facility] SPEECH PATHOLOGY AND AUDIOLOGY  [address] [place] [address]  Phone:  [phone]  Fax:  [phone]    Date: 04/11/2023    Patient: Laci Baugh  YOB: 1945  MRN: [id number]     Time Calculation    Start time: 1500  Stop time: 1550 Time Calculation (min): 50 minutes         Chief Complaint: Speech Therapy    Visit #: 5    Subjective Evaluation  Mr. Baugh arrived at his appointment on time and brought his homework with him. He told the clinician that he wanted implement practice of saying hello to famous historical figures. Mr. Baugh actively participated in the session, and his attention span had significantly improved and he was able to stay on task for longer durations.     Speech Therapy Objective   Continue Lesson One of SPEAKOUT!     Goals: Baseline Data: Todays Data:    LTG 1: Client will improve his intelligibility, comprehensibility, and naturalness in conversational tasks in all speaking environments     -  -   STG. 1. Client will produce spontaneous conversation at 75 dB SPL in the clinical setting without clinician cues across three consecutive sessions   3/7/23: 53-86dB 3/7/23: 53-86dB   3/28/23: Average 65   4/12/23: 65dB-79dB   STG 2. Client will warm up voice at 85-90 dB SPL in the clinical setting without cues across three consecutive sessions  3/28/23  1. N/A  2. 79 dB  3. 82 dB  4. 86 dB  5. 86 dB  4/12/23  80dB  80dB  85dB  85dB  75dB   STG 3. Client will sustain \"ah\" for 15 seconds in the clinical setting without modulating and without cues across three consecutive sessions       SPL Meter to Mouth Distance: 50 centimeters      3/7/23  Trial 1:   duration: 7 seconds  dBSPL: 63dB     Trial 2:  Duration: 7.5 seconds  dBSPL: 54-73dB     Trial 3:   Duration: 5 seconds  dBSPL: 77-85dB     Trial 4:  Duration: 3 seconds  dBSPL: 68-84dB     Trial 5:   Duration: 4 seconds  dBSPL: 66-88dB     Trial 6: "   Duration: 4 seconds  dBSPL: 73-82dB     SPL Meter to Mouth Distance: 50 centimeters    4/12/23    T1: 77-86dB  Duration: 7 seconds     T2: 63-78dB  Duration: 5 seconds    T3: 78-81dB  Duration: 5 seconds    T4: 73-81dB  Duration: 4 seconds    T5: 78-81dB  Duration: 6 seconds    STG 4. Client will produce vocal glides at 75-85 dB SPL in the clinical setting without cues across three consecutive sessions  4/4/23  Glide-up:  T1: 70-80dB  T2: 80dB  T3: 80-86dB  T4:78-82dB  T5:77-84dB     Glide-down  T1: 70-64dB  T2: 82-63dB  T3: 72-62dB  T4:82dB  T5: 86dB 4/12/23  Glide-up:  T1: 60-85dB  T2: 70dB-80dB  T3: 70-75dB  T4: 80dB  T5:75dB     Glide-down  T1: 86-70dB  T2: 86-65dB  T3: 87-70dB  T4:88-70dB  T5: 88-70dB   STG 5. Client will produce vocal glides with good clear vocal quality 15/15 trials in the clinical setting without cues across three consecutive sessions  4/4/23   Not met.  4/12/23  Not met.    STG 6. Client will recite numerical sequences at 85 dB SPL in the clinical setting without cues across three consecutive sessions  4/4/23  dB range: 70-89dB  4/12/23  Not addressed    STG 7. Client will complete simple then complex cognitive-linguistic tasks at 75 dB SPL spontaneously and without cues in the clinical setting across three consecutive sessions   3/7/23: Average dBSPL range: 53-86dB      Client spoke about different topics ( i.e , past jobs, and his wife) and maintained an average dBSPL range of  53-86dB  Not addressed     STG 8. Client will complete daily carry-over home practice tasks 5 out of 7 days throughout the entire program without clinician cues  Client stated to have practiced carryover tasks. Client was given new carryover tasks to do at home on top of the functional phrases he had already been given.    STG 9. Client will warm up voice with good intent by using a Likert scale (0-no intent, 10-intent), rating himself within +/- 1 of the clinician's score across three consecutive  "sessions  Not addressed  Clinician: 4/10    Client: 6/10   STG 10. Client will sustain \"ah\" with good intent by using a Likert scale (0-no intent, 10-intent), rating himself within +/- 1 of the clinician's score across three consecutive sessions  Not addressed  Clinician 5/10    Client 5/10   STG 11. Client will produce vocal glides with good intent by using a Likert scale (0-no intent, 10-intent), rating himself within +/- 1 of the clinician's score across three consecutive sessions  23  Glide high: Likert scale 1/5  NA  2. Client 4/5      Clinician 4/5     3.Client 4/5     Clinician 4/5     4.Client 4/5     Clinician 3/5     5. Client 2/5      Clinician 3/5      [place] low: Likert scale 1/5  Client 3/5        Clinician 3/5     2.Client 3/5     Clinician 2/5     3.Client 2/5      Clinician 1/5     4.Client -      Clinician 2/5     5.Client 3/5      Clinician 4/5 23  [place] high    Clinician 5/10    Client 6/10      [place] low:     Clinician 5/10    Client 7/10   STG 12. Client will recite numerical sequences with good intent by using a Likert scale (0-no intent, 10-intent), rating himself within +/- 1 of the clinician's score across three consecutive sessions  23  Clinician ratin10    Client ratin10 23   Clinician ratin/10    Client ratin/10   STG 13. Client will read phrases with good intent by using a Likert scale (0-no intent, 10-intent), rating himself within +/- 1 of the clinician's score across three consecutive sessions  Not addressed  Not addressed    STG 14. Client will complete simple then complex cognitive-linguistic tasks with good intent by using a Likert scale (0-no intent, 10-intent), rating himself within +/- 1 of the clinician's score across three consecutive sessions  Not addressed  Not addressed    STG 15. Client's spontaneous speech with be comprehensible to unfamiliar listeners rated by using a Likert scale (0-incomprehensible, 10-comprehensible), rating " "himself within +/- 1 of the clinician's score across three consecutive sessions   3/7/23:  Clinician ratin/10   Client rating: Not addressed  23  Clinician ratin/10   Client rating: Not addressed    STG 16. Client will produce intentful speech (85-90dbSPL) when stating functional phrases given to him throughout the session with no clinician support 5/5 time across three consecutive sessions.    Client to practice functional phrases at home and make five more phrases to practice.  Clinician rating 3/5      STG 17. The client will use facial expressions that properly express his emotions with minimal clinician cueing on 8/10 attempts over five sessions.     23  0/10 4/12/23  5/10 attempts where client expressed facial expression without cueing.        Assessments  The SPEAKOUT! program was modified to improve the intensity, naturalness, intelligibility, and comprehensibility of Mr. Baugh's speech. Mr. Baugh worked through the warm-ups with maximum clinician support. Mr. Baugh facial expressions when talking improved and he self corrected when he was not over-exaggerating his eyes or mouth. Mr. Baugh attention to therapy tasks has greatly improved. A sign it \"With Intent\" was placed high over and behind the clinician's head to help patient focus on speaking with Intent and speaking \"above the listener's head\". He did well with this and indicated to the therapist that the sign \"helped\" him stay focused and improved his overall ability to perform the tasks.  He is more aware of his digressions. He was still slightly resistant to comply with tasks but is beginning to buy into the program more. Mr. Baugh needs reminders of the goals and potential outcomes of the program.     Speech Therapy Plan  Speech Therapy Plan     -Continue SPEAKOUT! lesson two      -Include exercises that include motor tasks while doing SPEAKOUT! to add cognitive load      -Include focus on facial expressions        Functional " Assessment Used       Plan: Implement SPEAK OUT!      Speech Therapy Plan :        Prognosis:  Good       Goals    Short Term Goals:  Patient to begin progressing on established goals       Short Term Goal Duration (Weeks):  2-4 weeks       Long Term Goal Duration (Weeks):  2-4 weeks       Potential barriers to Goal Achievement: N/A       Therapy Recommendations        Recommendation:  Individual Speech Therapy       Planned Therapy Interventions:  Voice training        Frequency: 1x week       [x] As the licensed therapist supervising this student, I was present during the entire treatment session directing the care and reviewing the assessment plan.  I reviewed all documentation prior to signing.

## 2023-04-25 ENCOUNTER — SPEECH THERAPY (OUTPATIENT)
Dept: SPEECH THERAPY | Facility: OTHER | Age: 78
End: 2023-04-25
Payer: COMMERCIAL

## 2023-04-25 DIAGNOSIS — G20.A1 PARKINSON'S DISEASE (HCC): ICD-10-CM

## 2023-04-25 PROCEDURE — 92507 TX SP LANG VOICE COMM INDIV: CPT | Mod: GN,GC | Performed by: SPEECH-LANGUAGE PATHOLOGIST

## 2023-04-26 NOTE — OP THERAPY DAILY TREATMENT
"  Outpatient Speech Therapy  DAILY TREATMENT     [facility] SPEECH PATHOLOGY AND AUDIOLOGY  [address] [facility] [address]  Phone:  [phone]  Fax:  [phone]    Date: 04/25/2023    Patient: Laci Baugh  YOB: 1945  MRN: [id number]     Time Calculation    Start time: 1500  Stop time: 1550 Time Calculation (min): 50 minutes     Referring Provider: Kamille Hilario M.D. [address] [place]  [address]       Chief Complaint: Speech Therapy    Visit #: 6    Subjective Evaluation  Mr. Baugh arrived to his appointment on time. He told the clinician that he was on new medicine that he feels is making him \"befuddled\" and causing GERD.  coughed more than he has in the last sessions. He stated he was ashamed of a \"psychotic episode\" in public that he blamed on the medicine change. Mr. Baugh said he was at a donut shop and the  told him that there were no donuts that he requested, but another worker said there were donuts in the back. He called the  a liar and later said he was very ashamed of his outburst.     Speech Therapy Objective  Continue Lesson Two of SPEAKOUT!      Goals: Baseline Data: Todays Data:    LTG 1: Client will improve his intelligibility, comprehensibility, and naturalness in conversational tasks in all speaking environments     -  -   STG. 1. Client will produce spontaneous conversation at 75 dB SPL in the clinical setting without clinician cues across three consecutive sessions   3/7/23: 53-86dB 3/7/23: 53-86dB   3/28/23: Average 65   4/12/23: 65dB-79dB  4/25/23: 60-70dB   STG 2. Client will warm up voice at 85-90 dB SPL in the clinical setting without cues across three consecutive sessions  3/28/23  1. N/A  2. 79 dB  3. 82 dB  4. 86 dB  5. 86 dB  4/25/23  65-80  60-70  50-80  50-85  70-82   STG 3. Client will sustain \"ah\" for 15 seconds in the clinical setting without modulating and without cues across three consecutive " sessions       SPL Meter to Mouth Distance: 50 centimeters      3/7/23  Trial 1:   duration: 7 seconds  dBSPL: 63dB     Trial 2:  Duration: 7.5 seconds  dBSPL: 54-73dB     Trial 3:   Duration: 5 seconds  dBSPL: 77-85dB     Trial 4:  Duration: 3 seconds  dBSPL: 68-84dB     Trial 5:   Duration: 4 seconds  dBSPL: 66-88dB     Trial 6:   Duration: 4 seconds  dBSPL: 73-82dB     SPL Meter to Mouth Distance: 50 centimeters     4/25/23     T1: 70-85dB  Duration: 5 seconds      T2: 72-82dB  Duration: 4 seconds     T3: 79-83dB  Duration: 5 seconds     T4: 74-84dB  Duration: 5 seconds     T5: 80dB  Duration: 3 seconds    STG 4. Client will produce vocal glides at 75-85 dB SPL in the clinical setting without cues across three consecutive sessions  4/4/23  Glide-up:  T1: 70-80dB  T2: 80dB  T3: 80-86dB  T4:78-82dB  T5:77-84dB     Glide-down  T1: 70-64dB  T2: 82-63dB  T3: 72-62dB  T4:82dB  T5: 86dB 4/25/23  Glide-up:  T1: 65-86dB  T2: 80dB-84dB  T3: 76-82dB  T4: 65-75dB  T5: 67-76dB     Glide-down  T1: 84-63dB  T2: 86-65dB  T3: 85--65dB  T4:85-70dB  T5: 80-65dB   STG 5. Client will produce vocal glides with good clear vocal quality 15/15 trials in the clinical setting without cues across three consecutive sessions  4/4/23   Not met.  4/25/23  Not met.    STG 6. Client will recite numerical sequences at 85 dB SPL in the clinical setting without cues across three consecutive sessions  4/4/23  dB range: 70-89dB  4/25/23  dB range: 63-91dB   STG 7. Client will complete simple then complex cognitive-linguistic tasks at 75 dB SPL spontaneously and without cues in the clinical setting across three consecutive sessions   3/7/23: Average dBSPL range: 53-86dB      Client spoke about different topics ( i.e , past jobs, and his wife) and maintained an average dBSPL range of  53-86dB  N/A    STG 8. Client will complete daily carry-over home practice tasks 5 out of 7 days throughout the entire program without clinician cues  Client  "stated to have practiced carryover tasks. N/A   STG 9. Client will warm up voice with good intent by using a Likert scale (0-no intent, 10-intent), rating himself within +/- 1 of the clinician's score across three consecutive sessions  Not addressed  23  Clinician: 4/10     Client: N/A   STG 10. Client will sustain \"ah\" with good intent by using a Likert scale (0-no intent, 10-intent), rating himself within +/- 1 of the clinician's score across three consecutive sessions  Not addressed  23  Clinician 4/10      Client N/A   STG 11. Client will produce vocal glides with good intent by using a Likert scale (0-no intent, 10-intent), rating himself within +/- 1 of the clinician's score across three consecutive sessions  23  [place] high: Likert scale 1/5  NA  2. Client 4/5      Clinician 4/5     3.Client 4/5     Clinician 4/5     4.Client 4/5     Clinician 3/5     5. Client 2/5      Clinician 3/5      [place] low: Likert scale 1/5  Client 3/5        Clinician 3/5     2.Client 3/5     Clinician 2/5     3.Client 2/5      Clinician 1/5     4.Client -      Clinician 2/5     5.Client 3/5      Clinician 4/5 23  [place] high     Clinician 4/10     Client N/A      [place] low:      Clinician 5/10     Client N/A   STG 12. Client will recite numerical sequences with good intent by using a Likert scale (0-no intent, 10-intent), rating himself within +/- 1 of the clinician's score across three consecutive sessions  23  Clinician ratin/10     Client ratin/10 4/25/23   Clinician ratin/10     Client ratin/10  Maximum clinician support    STG 13. Client will read phrases with good intent by using a Likert scale (0-no intent, 10-intent), rating himself within +/- 1 of the clinician's score across three consecutive sessions  Not addressed  Clinician:6/5/10   Client:7/10      STG 14. Client will complete simple then complex cognitive-linguistic tasks with good intent by using a Likert scale (0-no intent, " "10-intent), rating himself within +/- 1 of the clinician's score across three consecutive sessions  Not addressed  N/A    STG 15. Client's spontaneous speech with be comprehensible to unfamiliar listeners rated by using a Likert scale (0-incomprehensible, 10-comprehensible), rating himself within +/- 1 of the clinician's score across three consecutive sessions   3/7/23:  Clinician ratin/10   Client rating: Not addressed  23  Clinician ratin/10   Client rating: Not addressed    STG 16. Client will produce intentful speech (85-90dbSPL) when stating functional phrases given to him throughout the session with no clinician support 5/5 time across three consecutive sessions.    Client to practice functional phrases at home and make five more phrases to practice.  Clinician rating 3      STG 17. The client will use facial expressions that properly express his emotions with minimal clinician cueing on 8/10 attempts over five sessions.     23  0/10 23  4/10 attempts where client expressed facial expression without cueing.         Assessments  The SPEAKOUT! program was modified to improve the intensity, naturalness, intelligibility, and comprehensibility of Mr. Baugh's speech. Mr. Baugh worked through the warm-ups with maximum clinician support. Mr. Baugh facial expressions when talking continue to improve.  Mr. Baugh digressed more in the session and it was harder to keep him on task. He would refer back and continue on a conversation that transpired minutes before without prompting. A sign \"With Intent\" was placed high over and behind the clinician's head to help patient focus on speaking with Intent and speaking \"above the listener's head\". Mr. Baugh spoke louder in this session and was less resistant to comply with tasks. Mr. Baugh continues to struggle with the definitional difference between yelling vs. speaking with intent.     Speech Therapy Plan    Speech Therapy Plan     -Continue " SPEAKOUT! lesson two      -Include exercises that include motor tasks while doing SPEAKOUT! to add cognitive load      -Include focus on facial expressions          Functional Assessment Used       Plan: Implement SPEAK OUT!      Speech Therapy Plan :        Prognosis:  Good       Goals    Short Term Goals:  Patient to begin progressing on established goals       Short Term Goal Duration (Weeks):  2-4 weeks       Long Term Goal Duration (Weeks):  2-4 weeks       Potential barriers to Goal Achievement: N/A       Therapy Recommendations        Recommendation:  Individual Speech Therapy       Planned Therapy Interventions:  Voice training        Frequency: 1x week      [x] As the licensed therapist supervising this student, I was present during the entire treatment session directing the care and reviewing the assessment plan.  I reviewed all documentation prior to signing.

## 2023-06-21 ENCOUNTER — SPEECH THERAPY (OUTPATIENT)
Dept: SPEECH THERAPY | Facility: OTHER | Age: 78
End: 2023-06-21
Payer: COMMERCIAL

## 2023-06-21 DIAGNOSIS — G20.A1 PARKINSON'S DISEASE (HCC): ICD-10-CM

## 2023-06-21 PROCEDURE — 99999 PR NO CHARGE: CPT | Performed by: SPEECH-LANGUAGE PATHOLOGIST

## 2023-06-22 NOTE — OP THERAPY DAILY TREATMENT
"  Outpatient Speech Therapy  DAILY TREATMENT     [facility] SPEECH PATHOLOGY AND AUDIOLOGY  [address] [place] [address]  Phone:  [phone]  Fax:  [phone]    Date: 06/21/2023    Patient: Laci Baugh  YOB: 1945  MRN: [id number]     Time Calculation    Start time: 1330  Stop time: 1425 Time Calculation (min): 55 minutes         Chief Complaint: No chief complaint on file.    Visit #: 7    Subjective Evaluation    Mr. Baugh arrived to his appointment on time.Mr. Baugh was tangential and often needed to be redirected to the task at hand. He periodically needed to pause throughout the session due to crying outbursts. Mr. Baugh requested water after beginning the warm-up.     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    Mr. Baugh sustained 'ah', at a comfortable pitch for an average of 4.7 seconds. This is below the normal range of 10-15 seconds for someone of Mr. Baugh age. On this task, Mr. Baugh had an average intensity of 77.5 dB which is WNL. Mr. Baugh is ready to increase his breath support in directed practice.    Mr. Baugh average highest pitch was 229.2 Hz and his average lowest pitch was 116.5 Hz. Mr. Baugh is ready to work on increasing his vocal range.     Mr. Baugh read a list of words at an average of 78.3 dB which is WNL.      Speech Therapy Plan    Mr. Baugh with be seen again on 6/26/23. Mr. Baugh will continue practice with the intensive respiratory program.     Visit #: 7        LTG 1: Client will improve his intelligibility, comprehensibility, and naturalness in conversational tasks in all speaking environments       STG. 1. Client will produce spontaneous conversation at 75 " "dB SPL in the clinical setting without clinician cues across three consecutive sessions     STG 2. Client will warm up voice at 85-90 dB SPL in the clinical setting without cues across three consecutive sessions     STG 3. Client will sustain \"ah\" for 15 seconds in the clinical setting without modulating and without cues across three consecutive sessions    STG 4. Client will produce vocal glides at 75-85 dB SPL in the clinical setting without cues across three consecutive sessions    STG 5. Client will produce vocal glides with good clear vocal quality 15/15 trials in the clinical setting without cues across three consecutive sessions     STG 6. Client will recite numerical sequences at 85 dB SPL in the clinical setting without cues across three consecutive sessions     STG 7. Client will complete simple then complex cognitive-linguistic tasks at 75 dB SPL spontaneously and without cues in the clinical setting across three consecutive sessions     STG 8. Client will complete daily carry-over home practice tasks 5 out of 7 days throughout the entire program without clinician cues     STG 9. Client will warm up voice with good intent by using a Likert scale (0-no intent, 10-intent), rating himself within +/- 1 of the clinician's score across three consecutive sessions     STG 10. Client will sustain \"ah\" with good intent by using a Likert scale (0-no intent, 10-intent), rating himself within +/- 1 of the clinician's score across three consecutive sessions     STG 11. Client will produce vocal glides with good intent by using a Likert scale (0-no intent, 10-intent), rating himself within +/- 1 of the clinician's score across three consecutive sessions     STG 12. Client will recite numerical sequences with good intent by using a Likert scale (0-no intent, 10-intent), rating himself within +/- 1 of the clinician's score across three consecutive sessions     STG 13. Client will read phrases with good intent by using a " Likert scale (0-no intent, 10-intent), rating himself within +/- 1 of the clinician's score across three consecutive sessions     STG 14. Client will complete simple then complex cognitive-linguistic tasks with good intent by using a Likert scale (0-no intent, 10-intent), rating himself within +/- 1 of the clinician's score across three consecutive sessions     STG 15. Client's spontaneous speech with be comprehensible to unfamiliar listeners rated by using a Likert scale (0-incomprehensible, 10-comprehensible), rating himself within +/- 1 of the clinician's score across three consecutive sessions     STG 16. Client will produce intentful speech (85-90dbSPL) when stating functional phrases given to him throughout the session with no clinician support 5/5 time across three consecutive sessions.       STG 17. The client will use facial expressions that properly express his emotions with minimal clinician cueing on 8/10 attempts over five sessions.           [x] As the licensed therapist supervising this student, I was present during the entire treatment session directing the care and reviewing the assessment plan.  I reviewed all documentation prior to signing.    (Optional) The following changes or alternations were made by the licensed therapist.

## 2023-06-26 ENCOUNTER — SPEECH THERAPY (OUTPATIENT)
Dept: SPEECH THERAPY | Facility: OTHER | Age: 78
End: 2023-06-26
Payer: COMMERCIAL

## 2023-06-26 DIAGNOSIS — G20.A1 PARKINSON'S DISEASE (HCC): ICD-10-CM

## 2023-06-26 PROCEDURE — 99999 PR NO CHARGE: CPT | Performed by: SPEECH-LANGUAGE PATHOLOGIST

## 2023-06-27 NOTE — OP THERAPY DAILY TREATMENT
"  Outpatient Speech Therapy  DAILY TREATMENT     [facility] Speech Pathology & Audiology  [address] [place] [address]  Phone:  [phone]  Fax:  [phone]    Date: 06/26/2023    Patient: Laci Baugh  YOB: 1945  MRN: [id number]     Time Calculation    Start time: 1400  Stop time: 1455 Time Calculation (min): 55 minutes         Chief Complaint: No chief complaint on file.    Visit #: 8    Subjective Evaluation    Mr. Baugh arrived to his appointment on time independently. Mr. Baugh reported that he had been taking a medication for his allergies that he feels is improving his voice as well. Mr. Rodriguez periodically needed directions repeated throughout the session.    Speech Therapy Objective   Sustained \"ah\"  Average: 6.6 seconds  Average: 76.5 dB  Lowest to highest pitch:  Average: 202.3 Hz  Highest to lowest pitch:  Average: 92.5 Hz  Sentence Reading:  Average: 74.6 dB  5.    Article Reading with Inflection:               a. 6/7 trials with inflection IND    Assessments    Mr. 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    Mr. Baugh sustained 'ah', at a comfortable pitch for an average of 6.6 seconds. This is a 1.9 second increase from the previous session (4.7 seconds on 6/21/23). Mr Baugh average intensity throughout this exercise was 76.5 dB which is WNL.     Mr. Baugh average highest pitch was 202.3 Hz. This is a decrease of 26.9 Hz when compared to the previous session (229.2 Hz on 6/21/23). This decrease in vocal range maybe attributed to Mr. Baugh repeatedly performing this exercise with minimal breaks at the beginning of the data collection probe before being corrected by clinicians.    Mr. Baugh average lowest pitch was 92.5 Hz. This is a 24 Hz decrease when compared to the previous session (116.5 Hz on 6/21/23). This indicated that Mr. Baugh is widening his vocal range.     Mr. Baugh read his five functional " sentences with an average intensity of 74.6 dB which is WNL.    Mr. Baugh read a series of sentences from an article of interest to him. Of the seven sentences Mr. Baugh read, six were read with inflection.  Mr. Baugh read one sentence in a monotone manner. After clinician modeling, Mr. Baugh self-corrected and repeated the sentence with inflection.     Speech Therapy Plan    Mr. Baugh will be seen again on 7/10/23. Mr. Baugh will continue practice with the intensive respiratory program and directed practice on speaking with inflection.    Visit #: 8      [x] As the licensed therapist supervising this student, I was present during the entire treatment session directing the care and reviewing the assessment plan.  I reviewed all documentation prior to signing.

## 2023-07-10 ENCOUNTER — APPOINTMENT (RX ONLY)
Dept: URBAN - METROPOLITAN AREA CLINIC 6 | Facility: CLINIC | Age: 78
Setting detail: DERMATOLOGY
End: 2023-07-10

## 2023-07-10 ENCOUNTER — SPEECH THERAPY (OUTPATIENT)
Dept: SPEECH THERAPY | Facility: OTHER | Age: 78
End: 2023-07-10
Payer: COMMERCIAL

## 2023-07-10 DIAGNOSIS — D18.0 HEMANGIOMA: ICD-10-CM

## 2023-07-10 DIAGNOSIS — Z85.828 PERSONAL HISTORY OF OTHER MALIGNANT NEOPLASM OF SKIN: ICD-10-CM

## 2023-07-10 DIAGNOSIS — G20.A1 PARKINSON'S DISEASE (HCC): ICD-10-CM

## 2023-07-10 DIAGNOSIS — L82.0 INFLAMED SEBORRHEIC KERATOSIS: ICD-10-CM

## 2023-07-10 DIAGNOSIS — L82.1 OTHER SEBORRHEIC KERATOSIS: ICD-10-CM

## 2023-07-10 DIAGNOSIS — D22 MELANOCYTIC NEVI: ICD-10-CM

## 2023-07-10 DIAGNOSIS — L81.4 OTHER MELANIN HYPERPIGMENTATION: ICD-10-CM

## 2023-07-10 DIAGNOSIS — Z71.89 OTHER SPECIFIED COUNSELING: ICD-10-CM

## 2023-07-10 DIAGNOSIS — L57.0 ACTINIC KERATOSIS: ICD-10-CM

## 2023-07-10 PROBLEM — D22.5 MELANOCYTIC NEVI OF TRUNK: Status: ACTIVE | Noted: 2023-07-10

## 2023-07-10 PROBLEM — D18.01 HEMANGIOMA OF SKIN AND SUBCUTANEOUS TISSUE: Status: ACTIVE | Noted: 2023-07-10

## 2023-07-10 PROBLEM — D48.5 NEOPLASM OF UNCERTAIN BEHAVIOR OF SKIN: Status: ACTIVE | Noted: 2023-07-10

## 2023-07-10 PROCEDURE — 17003 DESTRUCT PREMALG LES 2-14: CPT

## 2023-07-10 PROCEDURE — ? LIQUID NITROGEN

## 2023-07-10 PROCEDURE — 11103 TANGNTL BX SKIN EA SEP/ADDL: CPT

## 2023-07-10 PROCEDURE — ? SUNSCREEN TREATMENT REGIMEN

## 2023-07-10 PROCEDURE — ? BIOPSY BY SHAVE METHOD

## 2023-07-10 PROCEDURE — 99999 PR NO CHARGE: CPT | Performed by: SPEECH-LANGUAGE PATHOLOGIST

## 2023-07-10 PROCEDURE — 11102 TANGNTL BX SKIN SINGLE LES: CPT

## 2023-07-10 PROCEDURE — ? ADDITIONAL NOTES

## 2023-07-10 PROCEDURE — 17000 DESTRUCT PREMALG LESION: CPT | Mod: 59

## 2023-07-10 PROCEDURE — ? COUNSELING

## 2023-07-10 PROCEDURE — 99213 OFFICE O/P EST LOW 20 MIN: CPT | Mod: 25

## 2023-07-10 PROCEDURE — ? SUNSCREEN RECOMMENDATIONS

## 2023-07-10 ASSESSMENT — LOCATION DETAILED DESCRIPTION DERM
LOCATION DETAILED: LEFT SUPERIOR OCCIPITAL SCALP
LOCATION DETAILED: RIGHT SUPERIOR PARIETAL SCALP
LOCATION DETAILED: 2ND WEB SPACE RIGHT HAND
LOCATION DETAILED: RIGHT INFERIOR FOREHEAD
LOCATION DETAILED: NASAL DORSUM
LOCATION DETAILED: LEFT MEDIAL INFERIOR CHEST
LOCATION DETAILED: LEFT NASAL SIDEWALL
LOCATION DETAILED: MID-OCCIPITAL SCALP
LOCATION DETAILED: LEFT DISTAL PRETIBIAL REGION
LOCATION DETAILED: LEFT SUPERIOR MEDIAL FOREHEAD
LOCATION DETAILED: LEFT SUPERIOR FOREHEAD
LOCATION DETAILED: LEFT SUPERIOR MEDIAL MALAR CHEEK
LOCATION DETAILED: LEFT DISTAL POSTERIOR UPPER ARM
LOCATION DETAILED: EPIGASTRIC SKIN
LOCATION DETAILED: LEFT SUPERIOR PARIETAL SCALP
LOCATION DETAILED: LEFT MEDIAL FRONTAL SCALP
LOCATION DETAILED: PERIUMBILICAL SKIN
LOCATION DETAILED: LEFT ULNAR DORSAL HAND
LOCATION DETAILED: LEFT MEDIAL SUPERIOR CHEST

## 2023-07-10 ASSESSMENT — LOCATION SIMPLE DESCRIPTION DERM
LOCATION SIMPLE: ABDOMEN
LOCATION SIMPLE: RIGHT HAND
LOCATION SIMPLE: RIGHT FOREHEAD
LOCATION SIMPLE: LEFT HAND
LOCATION SIMPLE: LEFT PRETIBIAL REGION
LOCATION SIMPLE: POSTERIOR SCALP
LOCATION SIMPLE: LEFT CHEEK
LOCATION SIMPLE: LEFT SCALP
LOCATION SIMPLE: CHEST
LOCATION SIMPLE: SCALP
LOCATION SIMPLE: LEFT NOSE
LOCATION SIMPLE: LEFT UPPER ARM
LOCATION SIMPLE: NOSE
LOCATION SIMPLE: LEFT FOREHEAD
LOCATION SIMPLE: LEFT OCCIPITAL SCALP

## 2023-07-10 ASSESSMENT — LOCATION ZONE DERM
LOCATION ZONE: NOSE
LOCATION ZONE: FACE
LOCATION ZONE: SCALP
LOCATION ZONE: HAND
LOCATION ZONE: LEG
LOCATION ZONE: ARM
LOCATION ZONE: TRUNK

## 2023-07-11 NOTE — OP THERAPY DAILY TREATMENT
"  Outpatient Speech Therapy  DAILY TREATMENT     [facility] Speech Pathology & Audiology  [address] [place] [address]  Phone:  [phone]  Fax:  [phone]    Date: 07/10/2023    Patient: Laci Baugh  YOB: 1945  MRN: [id number]     Time Calculation    Start time: 1400  Stop time: 1455 Time Calculation (min): 55 minutes         Chief Complaint: Speech Therapy    Visit #: 9    Subjective Evaluation  Mr. Baugh was seen for speech therapy services today. Mr. Baugh reported feeling inconsistencies with his medication and that he was having a difficult time today. He reported, \"I have been pushing myself a lot\". Mr. Baugh periodically required redirection to stimulus throughout the session.     Speech Therapy Objective   1. Sustained \"ah\"   a. Average: 4.3 seconds   b. Average: 74 dB  2. Lowest to Highest Pitch   a.  Average: 157 Hz  3. Highest to Lowest Pitch   a. Average: 88 Hz  4. Sentence Reading   a. Average: 74.4 dB  5. Article Reading with Inflection   a. Average: 8/10 trials with inflection independent       Assessments    Mr. Baugh presents with Parkinson's disease and a diagnosis of mild hypokinetic dysarthria.    Mr. Baugh sustained \"ah\" for as long as possible, at a comfortable pitch for an average of 4.3 seconds. This is a 2.3 second decrease from the previous session (6.6 seconds to 4.3 seconds). His average intensity for this phonation task was 74 dB, which is a slight decrease from previous session (76.5 dB to 74 dB). This is WNL since the expected intensity for the task is between 70-80 dB. Reminders to drink water prevented vocal fatigue.     Mr. Baugh's average highest pitch was 157 Hz. This is a significant decrease when compared to the last session (202.3 Hz to 157 Hz). This decrease in vocal range may be attributed to Mr. Baugh' apparent fatigue as a result of being increasingly active the last few days. In addition, he repeatedly attempted the vocal " exercise with limited breaks. Following clinician prompts to take breaks and up his water intake, he demonstrated improvement toward the end of the exercise.     Mr. Baugh's average lowest pitch was 88 Hz. This is a 4.5 Hz decrease from last session (92.5 Hz to 88 Hz), which is an indicator towards goal advancement. It was noted that Mr. Baugh had a wider and smoother vocal range during this exercise compared to the previous low to high exercise. He reported this felt easier for him to accomplish.     Mr. Baugh read his five functional sentences with an average intensity of 74.4 dB which is WNL and consistent with previous session's performance.     Mr. Baugh read a series of sentences from a preferred article of interest. Mr. Baugh read 8/10 sentences with inflection (80% accuracy). He read two sentences in a monotone manner. Following clinician modeling, Mr. Baugh self-corrected and added inflection to the repeated sentences.     Speech Therapy Plan  Mr. Baugh will return to speech therapy on 7/17/2023. The intensive respiratory program and speaking with inflection practice will continue.     Visit #: 9/50    [x] As the licensed therapist supervising this student, I was present during the entire treatment session directing the care and reviewing the assessment plan.  I reviewed all documentation prior to signing.

## 2023-07-17 ENCOUNTER — SPEECH THERAPY (OUTPATIENT)
Dept: SPEECH THERAPY | Facility: OTHER | Age: 78
End: 2023-07-17
Payer: COMMERCIAL

## 2023-07-17 DIAGNOSIS — G20.A1 PARKINSON'S DISEASE (HCC): ICD-10-CM

## 2023-07-17 PROCEDURE — 92507 TX SP LANG VOICE COMM INDIV: CPT | Mod: GN,GC | Performed by: SPEECH-LANGUAGE PATHOLOGIST

## 2023-07-18 NOTE — OP THERAPY DAILY TREATMENT
"  Outpatient Speech Therapy  DAILY TREATMENT     [facility] Speech Pathology & Audiology  [address] [place] [address]  Phone:  [phone]  Fax:  [phone]    Date: 07/17/2023    Patient: Laci Baugh  YOB: 1945  MRN: [id number]     Time Calculation    Start time: 1400  Stop time: 1455 Time Calculation (min): 55 minutes         Chief Complaint: No chief complaint on file.    Visit #: 10    Subjective Evaluation    Mr. Baugh arrived on time to today's session independently. Mr. Baugh brought his own article of interest to the session to practice reading but, was unable to read due to the font being too small. Mr. Baugh was tangential and polite throughout the session.     Speech Therapy Objective    1. Sustained \"ah\"              a. Average: 4.3 seconds              b. Average: 80 dB  2. Lowest to Highest Pitch              a.  Average: 140 Hz  3. Highest to Lowest Pitch              a. Average: 92.6 Hz  4. Sentence Reading              a. Average: 76 dB  5. Article Reading with Inflection              a.  5/8 trials with inflection independent      Assessments    Mr. Baugh presents with Parkinson's disease and a diagnosis of mild hypokinetic dysarthria.    Mr. Diaz sustained \"ah\" for as long as possible, at a comfortable pitch for an average of 4.3 seconds. This is equal to his previous session (4.3 on 7/10/23). His average intensity for this session was 80 dB, which is a 6 dB increase when compared to his previous session (74 dB on 7/10/23). This is WNL since the expected intensity for the task is between 70-80 dB. Mr. Baugh was given regular reminders to drink water during the session.    Mr. Baugh' average highest pitch was 140.2 Hz. This is a decrease when compared to his previous session (157 Hz on 7/10/23). This decrease in range may be attributed to Mr. Rodriguez limited breaks between trials. Mr. Baugh needed to be reminded to take a breath between each trial. " "At one point when clinicians reminded him to take a break he responded , \"I'd rather get them over with\" and immediately continued to the next trial.    Mr. Baugh' average lowest pitch was 92.6 Hz. This is a 23.9 Hz decrease when compared to the baseline data collection (116.5 Hz on 6/21/23). This indicates that Mr. Baugh is continuing to expand his lower vocal range.    Mr. Baugh read his five functional sentences with an average intensity of 76.7 dB which is WNL and 2.3 dB higher then the previous data collection probe (74.4 dB on 7/10/23). This is indicative of Mr. Baugh' continued progress towards this goal.     Mr. Baugh read a series of sentences from an article of interest to him. Mr. Baugh read 5/8 sentences (62% accuracy) with inflection independently. Mr. Gonzalezs read three sentences in a monotone manner.  After clinician modeling, Mr. Baugh self-corrected and added inflection to the repeated sentences.    Speech Therapy Plan    Mr. Baugh will return to speech therapy on 7/24/2023. The intensive respiratory program and speaking with inflection practice will continue.     Visit #: 10/50    [x] As the licensed therapist supervising this student, I was present during the entire treatment session directing the care and reviewing the assessment plan.  I reviewed all documentation prior to signing.               "

## 2023-07-24 ENCOUNTER — SPEECH THERAPY (OUTPATIENT)
Dept: SPEECH THERAPY | Facility: OTHER | Age: 78
End: 2023-07-24
Payer: COMMERCIAL

## 2023-07-24 DIAGNOSIS — G20.A1 PARKINSON DISEASE (HCC): ICD-10-CM

## 2023-07-24 PROCEDURE — 99999 PR NO CHARGE: CPT | Performed by: SPEECH-LANGUAGE PATHOLOGIST

## 2023-07-25 NOTE — OP THERAPY DAILY TREATMENT
"  Outpatient Speech Therapy  DAILY TREATMENT     [facility] Speech Pathology & Audiology  [address] [place] [address]  Phone:  [phone]  Fax:  [phone]    Date: 07/24/2023    Patient: Laci Baugh  YOB: 1945  MRN: [id number]     Time Calculation    Start time: 1400  Stop time: 1455 Time Calculation (min): 55 minutes         Chief Complaint: No chief complaint on file.    Visit #: 11    Subjective Evaluation    Mr. Baugh arrived on time independently. He reported having a busy day prior to his session. He was in high spirits and periodically joked with the clinicians in between therapeutic activities.    Speech Therapy Objective     1. Sustained \"ah\"              a. Average: 4.6 seconds              b. Average: 77.1 dB  2. Lowest to Highest Pitch              a.  Average: 171 Hz  3. Highest to Lowest Pitch              a. Average: 89 Hz  4. Sentence Reading              a. Average: 77 dB  5. Article Reading with Inflection              a. 4/4 trials with inflection independent     Assessments    Mr. Baugh presents with Parkinson's disease and a diagnosis of mild hypokinetic dysarthria.    Mr. Baugh' sustained \"ah\" for as long as possible, at a comfortable pitch for an average of 4.6 seconds. This is an increase of .3 seconds when compared to the previous two sessions (4.3 seconds on 7/17/23 and 7/10/23). His average intensity for this session was 77.1 dB which is WNL. Mr. Baugh was given regular reminders to drink water during the session.    Mr. Diaz average highest pitch was 171 Hz. This is a 30.8 Hz increase when compared to the previous session (140.2 Hz on 7/17/23).     Mr. Diaz average lowest pitch was 89 Hz. This is a 3.6 Hz decrease when compared to the previous data collection probe ( 92.6 Hz on 7/17/23) and a 27.5 Hz decrease when compared to the baseline data collection probe (116.5 Hz on 6/21/23). This indicates that Mr. Baugh is continuing to make " progress in this area of treatment.     Mr. Baugh read his five functional sentences with an average intensity of 77 dB. This is an increase on .3 dB when compared to the previous data collection probe (76.7 dB on 7/24/23). This is indicative of Mr. Baugh' continued progress towards this goal.    Mr. Baugh read a series of sentences from an article of interest to him. Mr. Baugh read 4/4 sentences (100% accuracy) with inflection independently. This is an increase in accuracy of 38% when compared to the previous data collection probe (62% accuracy on 7/17/23).       Speech Therapy Plan    Mr. Baugh will return to speech therapy on 7/31/2023. The intensive respiratory program and speaking with inflection practice will continue.     Visit #: 11/50      [x] As the licensed therapist supervising this student, I was present during the entire treatment session directing the care and reviewing the assessment plan.  I reviewed all documentation prior to signing.

## 2023-07-31 ENCOUNTER — SPEECH THERAPY (OUTPATIENT)
Dept: SPEECH THERAPY | Facility: OTHER | Age: 78
End: 2023-07-31
Payer: COMMERCIAL

## 2023-07-31 DIAGNOSIS — G20.A1 PARKINSON DISEASE (HCC): ICD-10-CM

## 2023-07-31 PROCEDURE — 99999 PR NO CHARGE: CPT | Performed by: SPEECH-LANGUAGE PATHOLOGIST

## 2023-08-03 NOTE — OP THERAPY PROGRESS SUMMARY
"  Outpatient Speech Therapy  PROGRESS SUMMARY NOTE      [facility] Speech Pathology & Audiology  [address] [address]  Phone:  [phone]  Fax:  [phone]    Date of Visit: 07/31/2023    Patient: Laci Baugh  YOB: 1945  MRN: [id number]     Referring Provider: Kamille Hilario M.D. [address] [place], [place]   Referring Diagnosis Parkinson's disease      Visit #: 12    Progress Report Period: 6/21/23-7/31/23    Time Calculation    Start time: 1400  Stop time: 1455 Time Calculation (min): 55 minutes         Chief Complaint: No chief complaint on file.    Visit Diagnoses     ICD-10-CM   1. Parkinson disease (HCC)  G20       Subjective Evaluation    This note serves as both a record of service provided on 7/31/23 (see \"current data\" gathered on this date of service), and also a record of progress across the last 8-week period. Mr. Baugh has attended excellent attendance (6/6 schedules sessions attended) to speech therapy. Mr. Baugh arrived on time to the session today independently. In conversation, Mr. Baugh was notably louder when speaking about topics of interest to him.       Speech Therapy Objective    Voice Intervention    LTG 1: Client will improve his intelligibility, comprehensibility, and naturalness in conversational tasks in all speaking environments        Short Term Goal 1. Client will produce spontaneous conversation at 75 dB SPL in the clinical setting cues across two sessions.  Baseline: Mr. Baugh spoke at 76 dB SPL during conversation on 7/31/23.  Current: Mr. Baugh first and only attempt at this goal was 7/31/23. Mr. Baugh needs to reach an average of 75 dB SPL in spontaneous conversation in one additional session to achieve this goal. Mr. Baugh progress with this goal indicates generalization of previous goals in spontaneous conversation.  Goal in progress     Short Term Goal 2. Client will increase his average loudness on sustained " "'ah' to 75-80 dB SPL in the clinical setting across two consecutive sessions.  Baseline: Mr. Baugh had an average intensity of 77.5 dB on 6/21/23.  Current:  Mr. Baugh has show consistent growth with this goal throughout the treatment period. Mr. Baugh baseline data for this goal started at 77.5 (6/21/23). On 7/17/23 Mr. Baugh progressed to an average of 80 dB on sustained 'ah' indicating he has mastered this goal.   Goal Met       Short Term Goal 3. Client will sustain \"ah\" for 10 seconds in the clinical setting without modulating and without cues across three consecutive sessions.  Baseline:  Mr. Baugh sustained 'ah', at a comfortable pitch for an average of 4.7 seconds on 6/21/23.  Current: Mr. Baugh has maintained this average time throughout the semester (ranging from 4.3-6.6 seconds). Mr. Baugh' highest average of the treatment period was 6.6 seconds on 6/26/23. Mr. Baugh consistent score on this goal indicates maintenance throughout the treatment period. Mr. Baugh is ready to continue targeting this skill in future treatment periods.  Goal in progress      Short Term Goal 4. Client will read functional sentences at 75 dB SPL in the clinical setting across two sessions.  Baseline: Mr. Baugh read his five functional sentences with an average intensity of 74.6 dB on 6/26/23.  Current: Mr. Baugh has shown consistent progress on this goal. On 6/26/23 baseline data was taken when Mr. Baugh read his functional sentences at 74.6 dB. Mr. Baugh met this goal after his performance on 7/17/23 where he reach an average of 76 dB and 7/24/23 where he reached an average of 77 dB. Mr. Baugh has mastered this goal and is ready to progress to an average of 80 dB on functional sentence reading.  Goal Met    Short Term Goal 5. Client will use inflection when reading an article of interest with 80% accuracy across two sessions independently.   Baseline: On 6/26/23 Mr. Baugh read 6/7 sentences (85% accuracy) with " "inflection independently.  Current: Mr. Baugh has shown consistent progress with this goal throughout the treatment period. On 6/26/23 Mr. Baugh achieved 85% accuracy reading sentences with inflection. On 7/24/23 Mr. Baugh read 4/4 sentences (100% accuracy) with inflection independently indicating mastery of this goal.   Goal Met     STG 6. Client will produce vocal glides at 75-85 dB SPL in the clinical setting without cues across three consecutive sessions  Goal Not Targeted    STG 7. Client will produce vocal glides with good clear vocal quality 15/15 trials in the clinical setting without cues across three consecutive sessions   Goal Not Targeted     STG 8. Client will recite numerical sequences at 85 dB SPL in the clinical setting without cues across three consecutive sessions   Goal Not Targeted     STG 9. Client will complete simple then complex cognitive-linguistic tasks at 75 dB SPL spontaneously and without cues in the clinical setting across three consecutive sessions   Goal Not Targeted     STG 10. Client will complete daily carry-over home practice tasks 5 out of 7 days throughout the entire program without clinician cues   Goal Not Targeted     STG 11. Client will warm up voice with good intent by using a Likert scale (0-no intent, 10-intent), rating himself within +/- 1 of the clinician's score across three consecutive sessions   Goal Not Targeted     STG 12. Client will sustain \"ah\" with good intent by using a Likert scale (0-no intent, 10-intent), rating himself within +/- 1 of the clinician's score across three consecutive sessions   Goal Not Targeted     STG 13. Client will produce vocal glides with good intent by using a Likert scale (0-no intent, 10-intent), rating himself within +/- 1 of the clinician's score across three consecutive sessions   Goal Not Targeted     STG 14. Client will recite numerical sequences with good intent by using a Likert scale (0-no intent, 10-intent), rating " himself within +/- 1 of the clinician's score across three consecutive sessions   Goal Not Targeted     STG 15. Client will read phrases with good intent by using a Likert scale (0-no intent, 10-intent), rating himself within +/- 1 of the clinician's score across three consecutive sessions   Goal Not Targeted     STG 16. Client will complete simple then complex cognitive-linguistic tasks with good intent by using a Likert scale (0-no intent, 10-intent), rating himself within +/- 1 of the clinician's score across three consecutive sessions   Goal Not Targeted     STG 17. Client's spontaneous speech with be comprehensible to unfamiliar listeners rated by using a Likert scale (0-incomprehensible, 10-comprehensible), rating himself within +/- 1 of the clinician's score across three consecutive sessions   Goal Not Targeted     STG 18. Client will produce intentful speech (85-90dbSPL) when stating functional phrases given to him throughout the session with no clinician support 5/5 time across three consecutive sessions.     Goal Not Targeted     STG 19. The client will use facial expressions that properly express his emotions with minimal clinician cueing on 8/10 attempts over five sessions.      Goal Not Targeted    Assessments    Mr. Baugh presents with Parkinson's disease and a diagnosis of mild hypokinetic dysarthria.    Speech Therapy Plan :  Frequency:  2x week  Duration (in visits):  20      It is recommended that Mr. Baugh continuing attended speech therapy in the Fall 2023 treatment period. Mr. Baugh expressed interest in focusing on swallowing therapy in future treatment periods per his doctors recommendation. Additionally, group therapy is recommended at this time to help Mr. Baugh generalize the skill he has learned in this treatment period.        Referring provider co-signature:  I have reviewed this plan of care and my co-signature certifies the need for services.    Certification Period: 07/31/2023 to  11/02/2023    Physician Signature: ________________________________ Date: ______________       [x] As the licensed therapist supervising this student, I was present during the entire treatment session directing the care and reviewing the assessment plan.  I reviewed all documentation prior to signing.

## 2023-08-30 ENCOUNTER — APPOINTMENT (RX ONLY)
Dept: URBAN - METROPOLITAN AREA CLINIC 36 | Facility: CLINIC | Age: 78
Setting detail: DERMATOLOGY
End: 2023-08-30

## 2023-08-30 PROBLEM — C44.319 BASAL CELL CARCINOMA OF SKIN OF OTHER PARTS OF FACE: Status: ACTIVE | Noted: 2023-08-30

## 2023-08-30 PROCEDURE — ? PRESCRIPTION

## 2023-08-30 PROCEDURE — 17312 MOHS ADDL STAGE: CPT

## 2023-08-30 PROCEDURE — 14041 TIS TRNFR F/C/C/M/N/A/G/H/F: CPT

## 2023-08-30 PROCEDURE — ? MOHS SURGERY

## 2023-08-30 PROCEDURE — 17311 MOHS 1 STAGE H/N/HF/G: CPT

## 2023-08-30 RX ORDER — CEPHALEXIN 500 MG/1
CAPSULE ORAL
Qty: 21 | Refills: 0 | Status: ERX | COMMUNITY
Start: 2023-08-30

## 2023-08-30 RX ADMIN — CEPHALEXIN: 500 CAPSULE ORAL at 00:00

## 2023-08-30 NOTE — PROCEDURE: MOHS SURGERY
Interpolation Flap Text: A decision was made to reconstruct the defect utilizing an interpolation axial flap and a staged reconstruction.  A telfa template was made of the defect.  This telfa template was then used to outline the interpolation flap.  The donor area for the pedicle flap was then injected with anesthesia.  The flap was excised through the skin and subcutaneous tissue down to the layer of the underlying musculature.  The interpolation flap was carefully excised within this deep plane to maintain its blood supply.  The edges of the donor site were undermined.   The donor site was closed in a primary fashion.  The pedicle was then rotated into position and sutured.  Once the tube was sutured into place, adequate blood supply was confirmed with blanching and refill.  The pedicle was then wrapped with xeroform gauze and dressed appropriately with a telfa and gauze bandage to ensure continued blood supply and protect the attached pedicle.
Complex Requirements: Exposure Of Vital Structure?: No
Number Of Hemigard Strips Per Side: 1
Second Skin Substitute Units (Will Override Primary Defect Units If Greater Than 0): 0
Complex Repair Preamble Text (Leave Blank If You Do Not Want): Extensive wide undermining was performed.
Banner Transposition Flap Text: The defect edges were debeveled with a #15 scalpel blade.  Given the location of the defect and the proximity to free margins a Banner transposition flap was deemed most appropriate.  Using a sterile surgical marker, an appropriate flap drawn around the defect. The area thus outlined was incised deep to adipose tissue with a #15 scalpel blade.  The skin margins were undermined to an appropriate distance in all directions utilizing iris scissors.
Wound Care: Vaseline
Presence Of Scar Tissue (For Histology): absent
Referring Physician (Optional): [place]
Suturegard Intro: Intraoperative tissue expansion was performed, utilizing the SUTUREGARD device, in order to reduce wound tension.
Consent 1/Introductory Paragraph: The rationale for Mohs was explained to the patient and consent was obtained. The risks, benefits and alternatives to therapy were discussed in detail. Specifically, the risks of infection, scarring, bleeding, prolonged wound healing, incomplete removal, allergy to anesthesia, nerve injury and recurrence were addressed. Prior to the procedure, the treatment site was clearly identified and confirmed by the patient. All components of Universal Protocol/PAUSE Rule completed.
Stage 10: Additional Anesthesia Type: 1% lidocaine with epinephrine
Asc Procedure Text (D): After obtaining clear surgical margins the patient was sent to an ASC for surgical repair.  The patient understands they will receive post-surgical care and follow-up from the ASC physician.
Consent (Scalp)/Introductory Paragraph: The rationale for Mohs was explained to the patient and consent was obtained. The risks, benefits and alternatives to therapy were discussed in detail. Specifically, the risks of changes in hair growth pattern secondary to repair, infection, scarring, bleeding, prolonged wound healing, incomplete removal, allergy to anesthesia, nerve injury and recurrence were addressed. Prior to the procedure, the treatment site was clearly identified and confirmed by the patient. All components of Universal Protocol/PAUSE Rule completed.
Burow's Graft Text: The defect edges were debeveled with a #15 scalpel blade.  Given the location of the defect, shape of the defect, the proximity to free margins and the presence of a standing cone deformity a Burow's skin graft was deemed most appropriate. The standing cone was removed and this tissue was then trimmed to the shape of the primary defect. The adipose tissue was also removed until only dermis and epidermis were left.  The skin margins of the secondary defect were undermined to an appropriate distance in all directions utilizing iris scissors.  The secondary defect was closed with interrupted buried subcutaneous sutures.  The skin edges were then re-apposed with running  sutures.  The skin graft was then placed in the primary defect and oriented appropriately.
Modified Advancement Flap Text: The defect edges were debeveled with a #15 scalpel blade.  Given the location of the defect, shape of the defect and the proximity to free margins a modified advancement flap was deemed most appropriate.  Using a sterile surgical marker, an appropriate advancement flap was drawn incorporating the defect and placing the expected incisions within the relaxed skin tension lines where possible.    The area thus outlined was incised deep to adipose tissue with a #15 scalpel blade.  The skin margins were undermined to an appropriate distance in all directions utilizing iris scissors.
Secondary Defect Width In Cm (Required For Flaps): 3
Eye Protection Verbiage: Before proceeding with the stage, a plastic scleral shield was inserted. The globe was anesthetized with a few drops of 1% lidocaine with 1:100,000 epinephrine. Then, an appropriate sized scleral shield was chosen and coated with lacrilube ointment. The shield was gently inserted and left in place for the duration of each stage. After the stage was completed, the shield was gently removed.
Consent (Temporal Branch)/Introductory Paragraph: The rationale for Mohs was explained to the patient and consent was obtained. The risks, benefits and alternatives to therapy were discussed in detail. Specifically, the risks of damage to the temporal branch of the facial nerve, infection, scarring, bleeding, prolonged wound healing, incomplete removal, allergy to anesthesia, and recurrence were addressed. Prior to the procedure, the treatment site was clearly identified and confirmed by the patient. All components of Universal Protocol/PAUSE Rule completed.
Graft Cartilage Fenestration Text: The cartilage was fenestrated with a 2mm punch biopsy to help facilitate graft survival and healing.
Consent (Nose)/Introductory Paragraph: The rationale for Mohs was explained to the patient and consent was obtained. The risks, benefits and alternatives to therapy were discussed in detail. Specifically, the risks of nasal deformity, changes in the flow of air through the nose, infection, scarring, bleeding, prolonged wound healing, incomplete removal, allergy to anesthesia, nerve injury and recurrence were addressed. Prior to the procedure, the treatment site was clearly identified and confirmed by the patient. All components of Universal Protocol/PAUSE Rule completed.
A-T Advancement Flap Text: The defect edges were debeveled with a #15 scalpel blade.  Given the location of the defect, shape of the defect and the proximity to free margins an A-T advancement flap was deemed most appropriate.  Using a sterile surgical marker, an appropriate advancement flap was drawn incorporating the defect and placing the expected incisions within the relaxed skin tension lines where possible.    The area thus outlined was incised deep to adipose tissue with a #15 scalpel blade.  The skin margins were undermined to an appropriate distance in all directions utilizing iris scissors.
Bilobed Transposition Flap Text: The defect edges were debeveled with a #15 scalpel blade.  Given the location of the defect and the proximity to free margins a bilobed transposition flap was deemed most appropriate.  Using a sterile surgical marker, an appropriate bilobe flap drawn around the defect.    The area thus outlined was incised deep to adipose tissue with a #15 scalpel blade.  The skin margins were undermined to an appropriate distance in all directions utilizing iris scissors.
Epidermal Closure: running cuticular
Retention Suture Bite Size: 1 mm
Previous Accession (Optional): [id number]
Wound Check: 6 weeks
Peng Advancement Flap Text: The defect edges were debeveled with a #15 scalpel blade.  Given the location of the defect, shape of the defect and the proximity to free margins a Peng advancement flap was deemed most appropriate.  Using a sterile surgical marker, an appropriate advancement flap was drawn incorporating the defect and placing the expected incisions within the relaxed skin tension lines where possible. The area thus outlined was incised deep to adipose tissue with a #15 scalpel blade.  The skin margins were undermined to an appropriate distance in all directions utilizing iris scissors.
Purse String (Intermediate) Text: Given the location of the defect and the characteristics of the surrounding skin a purse string intermediate closure was deemed most appropriate.  Undermining was performed circumfirentially around the surgical defect.  A purse string suture was then placed and tightened.
Epidermal Closure Graft Donor Site (Optional): running
Special Stains Stage 5 - Results: Base On Clearance Noted Above
Date Of Previous Biopsy (Optional): 7/10/23
Use Separate Skin Prep For Stages And Repair: Yes
Mid-Level Procedure Text (A): After obtaining clear surgical margins the patient was sent to a mid-level provider for surgical repair.  The patient understands they will receive post-surgical care and follow-up from the mid-level provider.
Consent 2/Introductory Paragraph: Mohs surgery was explained to the patient and consent was obtained. The risks, benefits and alternatives to therapy were discussed in detail. Specifically, the risks of infection, scarring, bleeding, prolonged wound healing, incomplete removal, allergy to anesthesia, nerve injury and recurrence were addressed. Prior to the procedure, the treatment site was clearly identified and confirmed by the patient. All components of Universal Protocol/PAUSE Rule completed.
Suture Removal: 9 days
Rhomboid Transposition Flap Text: The defect edges were debeveled with a #15 scalpel blade.  Given the location of the defect and the proximity to free margins a rhomboid transposition flap was deemed most appropriate.  Using a sterile surgical marker, an appropriate rhomboid flap was drawn incorporating the defect.    The area thus outlined was incised deep to adipose tissue with a #15 scalpel blade.  The skin margins were undermined to an appropriate distance in all directions utilizing iris scissors.
Oculoplastic Surgeon Procedure Text (F): After obtaining clear surgical margins the patient was sent to oculoplastics for surgical repair.  The patient understands they will receive post-surgical care and follow-up from the referring physician's office.
No Residual Tumor Seen Histology Text: There were no malignant cells seen in the sections examined.
H Plasty Text: Given the location of the defect, shape of the defect and the proximity to free margins a H-plasty was deemed most appropriate for repair.  Using a sterile surgical marker, the appropriate advancement arms of the H-plasty were drawn incorporating the defect and placing the expected incisions within the relaxed skin tension lines where possible. The area thus outlined was incised deep to adipose tissue with a #15 scalpel blade. The skin margins were undermined to an appropriate distance in all directions utilizing iris scissors.  The opposing advancement arms were then advanced into place in opposite direction and anchored with interrupted buried subcutaneous sutures.
Mastoid Interpolation Flap Text: A decision was made to reconstruct the defect utilizing an interpolation axial flap and a staged reconstruction.  A telfa template was made of the defect.  This telfa template was then used to outline the mastoid interpolation flap.  The donor area for the pedicle flap was then injected with anesthesia.  The flap was excised through the skin and subcutaneous tissue down to the layer of the underlying musculature.  The pedicle flap was carefully excised within this deep plane to maintain its blood supply.  The edges of the donor site were undermined.   The donor site was closed in a primary fashion.  The pedicle was then rotated into position and sutured.  Once the tube was sutured into place, adequate blood supply was confirmed with blanching and refill.  The pedicle was then wrapped with xeroform gauze and dressed appropriately with a telfa and gauze bandage to ensure continued blood supply and protect the attached pedicle.
Surgical Defect Width In Cm (Optional): 0.9
Cartilage Graft Text: The defect edges were debeveled with a #15 scalpel blade.  Given the location of the defect, shape of the defect, the fact the defect involved a full thickness cartilage defect a cartilage graft was deemed most appropriate.  An appropriate donor site was identified, cleansed, and anesthetized. The cartilage graft was then harvested and transferred to the recipient site, oriented appropriately and then sutured into place.  The secondary defect was then repaired using a primary closure.
Repair Anesthesia Method: local infiltration
Primary Defect Length In Cm (Final Defect Size - Required For Flaps/Grafts): 1.5
Area M Indication Text: Tumors in this location are included in Area M (cheek, forehead, scalp, neck, jawline and pretibial skin).  Mohs surgery is indicated for tumors in these anatomic locations.
Hemostasis: Electrocautery
Island Pedicle Flap-Requiring Vessel Identification Text: The defect edges were debeveled with a #15 scalpel blade.  Given the location of the defect, shape of the defect and the proximity to free margins an island pedicle advancement flap was deemed most appropriate.  Using a sterile surgical marker, an appropriate advancement flap was drawn, based on the axial vessel mentioned above, incorporating the defect, outlining the appropriate donor tissue and placing the expected incisions within the relaxed skin tension lines where possible.    The area thus outlined was incised deep to adipose tissue with a #15 scalpel blade.  The skin margins were undermined to an appropriate distance in all directions around the primary defect and laterally outward around the island pedicle utilizing iris scissors.  There was minimal undermining beneath the pedicle flap.
Manual Repair Warning Statement: We plan on removing the manually selected variable below in favor of our much easier automatic structured text blocks found in the previous tab. We decided to do this to help make the flow better and give you the full power of structured data. Manual selection is never going to be ideal in our platform and I would encourage you to avoid using manual selection from this point on, especially since I will be sunsetting this feature. It is important that you do one of two things with the customized text below. First, you can save all of the text in a word file so you can have it for future reference. Second, transfer the text to the appropriate area in the Library tab. Lastly, if there is a flap or graft type which we do not have you need to let us know right away so I can add it in before the variable is hidden. No need to panic, we plan to give you roughly 6 months to make the change.
Ftsg Text: The defect edges were debeveled with a #15 scalpel blade.  Given the location of the defect, shape of the defect and the proximity to free margins a full thickness skin graft was deemed most appropriate.  Using a sterile surgical marker, the primary defect shape was transferred to the donor site. The area thus outlined was incised deep to adipose tissue with a #15 scalpel blade.  The harvested graft was then trimmed of adipose tissue until only dermis and epidermis was left.  The skin margins of the secondary defect were undermined to an appropriate distance in all directions utilizing iris scissors.  The secondary defect was closed with interrupted buried subcutaneous sutures.  The skin edges were then re-apposed with running  sutures.  The skin graft was then placed in the primary defect and oriented appropriately.
Estimated Blood Loss (Cc): minimal
Inflammation Suggestive Of Cancer Camouflage Histology Text: There was a dense lymphocytic infiltrate which prevented adequate histologic evaluation of adjacent structures.
X Size Of Lesion In Cm (Optional): 0.5
Mart-1 - Positive Histology Text: MART-1 staining demonstrates areas of higher density and clustering of melanocytes with Pagetoid spread upwards within the epidermis. The surgical margins are positive for tumor cells.
Closure 3 Information: This tab is for additional flaps and grafts above and beyond our usual structured repairs.  Please note if you enter information here it will not currently bill and you will need to add the billing information manually.
Alternatives Discussed Intro (Do Not Add Period): I discussed alternative treatments to Mohs surgery and specifically discussed the risks and benefits of
V-Y Plasty Text: The defect edges were debeveled with a #15 scalpel blade.  Given the location of the defect, shape of the defect and the proximity to free margins an V-Y advancement flap was deemed most appropriate.  Using a sterile surgical marker, an appropriate advancement flap was drawn incorporating the defect and placing the expected incisions within the relaxed skin tension lines where possible.    The area thus outlined was incised deep to adipose tissue with a #15 scalpel blade.  The skin margins were undermined to an appropriate distance in all directions utilizing iris scissors.
Provider Procedure Text (E): After obtaining clear surgical margins the defect was repaired by another provider.
Out of season
Partial Purse String (Intermediate) Text: Given the location of the defect and the characteristics of the surrounding skin an intermediate purse string closure was deemed most appropriate.  Undermining was performed circumfirentially around the surgical defect.  A purse string suture was then placed and tightened. Wound tension only allowed a partial closure of the circular defect.
Ear Star Wedge Flap Text: The defect edges were debeveled with a #15 blade scalpel.  Given the location of the defect and the proximity to free margins (helical rim) an ear star wedge flap was deemed most appropriate.  Using a sterile surgical marker, the appropriate flap was drawn incorporating the defect and placing the expected incisions between the helical rim and antihelix where possible.  The area thus outlined was incised through and through with a #15 scalpel blade.
Vermilion Border Text: The closure involved the vermilion border.
Skin Substitute Text: The defect edges were debeveled with a #15 scalpel blade.  Given the location of the defect, shape of the defect and the proximity to free margins a skin substitute graft was deemed most appropriate.  The graft material was trimmed to fit the size of the defect. The graft was then placed in the primary defect and oriented appropriately.
Detail Level: Detailed
Pinch Graft Text: The defect edges were debeveled with a #15 scalpel blade. Given the location of the defect, shape of the defect and the proximity to free margins a pinch graft was deemed most appropriate. Using a sterile surgical marker, the primary defect shape was transferred to the donor site. The area thus outlined was incised deep to adipose tissue with a #15 scalpel blade.  The harvested graft was then trimmed of adipose tissue until only dermis and epidermis was left. The skin margins of the secondary defect were undermined to an appropriate distance in all directions utilizing iris scissors.  The secondary defect was closed with interrupted buried subcutaneous sutures.  The skin edges were then re-apposed with running  sutures.  The skin graft was then placed in the primary defect and oriented appropriately.
Repair Hemostasis (Optional): Pinpoint electrocautery
Dorsal Nasal Flap Text: The defect edges were debeveled with a #15 scalpel blade.  Given the location of the defect and the proximity to free margins a dorsal nasal flap was deemed most appropriate.  Using a sterile surgical marker, an appropriate dorsal nasal flap was drawn around the defect.    The area thus outlined was incised deep to adipose tissue with a #15 scalpel blade.  The skin margins were undermined to an appropriate distance in all directions utilizing iris scissors.
Crescentic Intermediate Repair Preamble Text (Leave Blank If You Do Not Want): Undermining was performed with blunt dissection.
Z Plasty Text: The lesion was extirpated to the level of the fat with a #15 scalpel blade.  Given the location of the defect, shape of the defect and the proximity to free margins a Z-plasty was deemed most appropriate for repair.  Using a sterile surgical marker, the appropriate transposition arms of the Z-plasty were drawn incorporating the defect and placing the expected incisions within the relaxed skin tension lines where possible.    The area thus outlined was incised deep to adipose tissue with a #15 scalpel blade.  The skin margins were undermined to an appropriate distance in all directions utilizing iris scissors.  The opposing transposition arms were then transposed into place in opposite direction and anchored with interrupted buried subcutaneous sutures.
Location Indication Override (Is Already Calculated Based On Selected Body Location): Area M
Bi-Rhombic Flap Text: The defect edges were debeveled with a #15 scalpel blade.  Given the location of the defect and the proximity to free margins a bi-rhombic flap was deemed most appropriate.  Using a sterile surgical marker, an appropriate rhombic flap was drawn incorporating the defect. The area thus outlined was incised deep to adipose tissue with a #15 scalpel blade.  The skin margins were undermined to an appropriate distance in all directions utilizing iris scissors.
Orbicularis Oris Muscle Flap Text: The defect edges were debeveled with a #15 scalpel blade.  Given that the defect affected the competency of the oral sphincter an orbicularis oris muscle flap was deemed most appropriate to restore this competency and normal muscle function.  Using a sterile surgical marker, an appropriate flap was drawn incorporating the defect. The area thus outlined was incised with a #15 scalpel blade.
Abbe Flap (Upper To Lower Lip) Text: The defect of the lower lip was assessed and measured.  Given the location and size of the defect, an Abbe flap was deemed most appropriate.  Using a sterile surgical marker, an appropriate Abbe flap was measured and drawn on the upper lip. Local anesthesia was then infiltrated.  A scalpel was then used to incise the upper lip through and through the skin, vermilion, muscle and mucosa, leaving the flap pedicled on the opposite side.  The flap was then rotated and transferred to the lower lip defect.  The flap was then sutured into place with a three layer technique, closing the orbicularis oris muscle layer with subcutaneous buried sutures, followed by a mucosal layer and an epidermal layer.
O-T Advancement Flap Text: The defect edges were debeveled with a #15 scalpel blade.  Given the location of the defect, shape of the defect and the proximity to free margins an O-T advancement flap was deemed most appropriate.  Using a sterile surgical marker, an appropriate advancement flap was drawn incorporating the defect and placing the expected incisions within the relaxed skin tension lines where possible.    The area thus outlined was incised deep to adipose tissue with a #15 scalpel blade.  The skin margins were undermined to an appropriate distance in all directions utilizing iris scissors.
Epidermal Autograft Text: The defect edges were debeveled with a #15 scalpel blade.  Given the location of the defect, shape of the defect and the proximity to free margins an epidermal autograft was deemed most appropriate.  Using a sterile surgical marker, the primary defect shape was transferred to the donor site. The epidermal graft was then harvested.  The skin graft was then placed in the primary defect and oriented appropriately.
Trilobed Flap Text: The defect edges were debeveled with a #15 scalpel blade.  Given the location of the defect and the proximity to free margins a trilobed flap was deemed most appropriate.  Using a sterile surgical marker, an appropriate trilobed flap drawn around the defect.    The area thus outlined was incised deep to adipose tissue with a #15 scalpel blade.  The skin margins were undermined to an appropriate distance in all directions utilizing iris scissors.
Adjacent Tissue Transfer Text: The defect edges were debeveled with a #15 scalpel blade.  Given the location of the defect and the proximity to free margins an adjacent tissue transfer was deemed most appropriate.  Using a sterile surgical marker, an appropriate flap was drawn incorporating the defect and placing the expected incisions within the relaxed skin tension lines where possible.    The area thus outlined was incised deep to adipose tissue with a #15 scalpel blade.  The skin margins were undermined to an appropriate distance in all directions utilizing iris scissors.
Plastic Surgeon Procedure Text (B): After obtaining clear surgical margins the patient was sent to plastics for surgical repair.  The patient understands they will receive post-surgical care and follow-up from the referring physician's office.
Tissue Cultured Epidermal Autograft Text: The defect edges were debeveled with a #15 scalpel blade.  Given the location of the defect, shape of the defect and the proximity to free margins a tissue cultured epidermal autograft was deemed most appropriate.  The graft was then trimmed to fit the size of the defect.  The graft was then placed in the primary defect and oriented appropriately.
O-T Plasty Text: The defect edges were debeveled with a #15 scalpel blade.  Given the location of the defect, shape of the defect and the proximity to free margins an O-T plasty was deemed most appropriate.  Using a sterile surgical marker, an appropriate O-T plasty was drawn incorporating the defect and placing the expected incisions within the relaxed skin tension lines where possible.    The area thus outlined was incised deep to adipose tissue with a #15 scalpel blade.  The skin margins were undermined to an appropriate distance in all directions utilizing iris scissors.
Hemigard Intro: Due to skin fragility and wound tension, it was decided to use HEMIGARD adhesive retention suture devices to permit a linear closure. The skin was cleaned and dried for a 6cm distance away from the wound. Excessive hair, if present, was removed to allow for adhesion.
Helical Rim Advancement Flap Text: The defect edges were debeveled with a #15 blade scalpel.  Given the location of the defect and the proximity to free margins (helical rim) a double helical rim advancement flap was deemed most appropriate.  Using a sterile surgical marker, the appropriate advancement flaps were drawn incorporating the defect and placing the expected incisions between the helical rim and antihelix where possible.  The area thus outlined was incised through and through with a #15 scalpel blade.  With a skin hook and iris scissors, the flaps were gently and sharply undermined and freed up.
Advancement-Rotation Flap Text: The defect edges were debeveled with a #15 scalpel blade.  Given the location of the defect, shape of the defect and the proximity to free margins an advancement-rotation flap was deemed most appropriate.  Using a sterile surgical marker, an appropriate flap was drawn incorporating the defect and placing the expected incisions within the relaxed skin tension lines where possible. The area thus outlined was incised deep to adipose tissue with a #15 scalpel blade.  The skin margins were undermined to an appropriate distance in all directions utilizing iris scissors.
Mercedes Flap Text: The defect edges were debeveled with a #15 scalpel blade.  Given the location of the defect, shape of the defect and the proximity to free margins a Mercedes flap was deemed most appropriate.  Using a sterile surgical marker, an appropriate advancement flap was drawn incorporating the defect and placing the expected incisions within the relaxed skin tension lines where possible. The area thus outlined was incised deep to adipose tissue with a #15 scalpel blade.  The skin margins were undermined to an appropriate distance in all directions utilizing iris scissors.
Why Was The Change Made?: Please Select the Appropriate Response
Tarsorrhaphy Text: A tarsorrhaphy was performed using Frost sutures.
Repair Type: Flap
Double Island Pedicle Flap Text: The defect edges were debeveled with a #15 scalpel blade.  Given the location of the defect, shape of the defect and the proximity to free margins a double island pedicle advancement flap was deemed most appropriate.  Using a sterile surgical marker, an appropriate advancement flap was drawn incorporating the defect, outlining the appropriate donor tissue and placing the expected incisions within the relaxed skin tension lines where possible.    The area thus outlined was incised deep to adipose tissue with a #15 scalpel blade.  The skin margins were undermined to an appropriate distance in all directions around the primary defect and laterally outward around the island pedicle utilizing iris scissors.  There was minimal undermining beneath the pedicle flap.
Bilateral Helical Rim Advancement Flap Text: The defect edges were debeveled with a #15 blade scalpel.  Given the location of the defect and the proximity to free margins (helical rim) a bilateral helical rim advancement flap was deemed most appropriate.  Using a sterile surgical marker, the appropriate advancement flaps were drawn incorporating the defect and placing the expected incisions between the helical rim and antihelix where possible.  The area thus outlined was incised through and through with a #15 scalpel blade.  With a skin hook and iris scissors, the flaps were gently and sharply undermined and freed up.
Tumor Debulked?: curette
Closure 2 Information: This tab is for additional flaps and grafts, including complex repair and grafts and complex repair and flaps. You can also specify a different location for the additional defect, if the location is the same you do not need to select a new one. We will insert the automated text for the repair you select below just as we do for solitary flaps and grafts. Please note that at this time if you select a location with a different insurance zone you will need to override the ICD10 and CPT if appropriate.
Consent (Near Eyelid Margin)/Introductory Paragraph: The rationale for Mohs was explained to the patient and consent was obtained. The risks, benefits and alternatives to therapy were discussed in detail. Specifically, the risks of ectropion or eyelid deformity, infection, scarring, bleeding, prolonged wound healing, incomplete removal, allergy to anesthesia, nerve injury and recurrence were addressed. Prior to the procedure, the treatment site was clearly identified and confirmed by the patient. All components of Universal Protocol/PAUSE Rule completed.
Bcc Infiltrative Histology Text: There were numerous aggregates of basaloid cells demonstrating an infiltrative pattern.
Otolaryngologist Procedure Text (D): After obtaining clear surgical margins the patient was sent to otolaryngology for surgical repair.  The patient understands they will receive post-surgical care and follow-up from the referring physician's office.
Rhombic Flap Text: The defect edges were debeveled with a #15 scalpel blade.  Given the location of the defect and the proximity to free margins a rhombic flap was deemed most appropriate.  Using a sterile surgical marker, an appropriate rhombic flap was drawn incorporating the defect.    The area thus outlined was incised deep to adipose tissue with a #15 scalpel blade.  The skin margins were undermined to an appropriate distance in all directions utilizing iris scissors.
Mohs Rapid Report Verbiage: The area of clinically evident tumor was marked with skin marking ink and appropriately hatched.  The initial incision was made following the Mohs approach through the skin.  The specimen was taken to the lab, divided into the necessary number of pieces, chromacoded and processed according to the Mohs protocol.  This was repeated in successive stages until a tumor free defect was achieved.
Transposition Flap Text: The defect edges were debeveled with a #15 scalpel blade.  Given the location of the defect and the proximity to free margins a transposition flap was deemed most appropriate.  Using a sterile surgical marker, an appropriate transposition flap was drawn incorporating the defect.    The area thus outlined was incised deep to adipose tissue with a #15 scalpel blade.  The skin margins were undermined to an appropriate distance in all directions utilizing iris scissors.
Depth Of Tumor Invasion (For Histology): dermis
Mauc Instructions: By selecting yes to the question below the MAUC number will be added into the note.  This will be calculated automatically based on the diagnosis chosen, the size entered, the body zone selected (H,M,L) and the specific indications you chose. You will also have the option to override the Mohs AUC if you disagree with the automatically calculated number and this option is found in the Case Summary tab.
Mucosal Advancement Flap Text: Given the location of the defect, shape of the defect and the proximity to free margins a mucosal advancement flap was deemed most appropriate. Incisions were made with a 15 blade scalpel in the appropriate fashion along the cutaneous vermilion border and the mucosal lip. The remaining actinically damaged mucosal tissue was excised.  The mucosal advancement flap was then elevated to the gingival sulcus with care taken to preserve the neurovascular structures and advanced into the primary defect. Care was taken to ensure that precise realignment of the vermilion border was achieved.
O-Z Flap Text: The defect edges were debeveled with a #15 scalpel blade.  Given the location of the defect, shape of the defect and the proximity to free margins an O-Z flap was deemed most appropriate.  Using a sterile surgical marker, an appropriate transposition flap was drawn incorporating the defect and placing the expected incisions within the relaxed skin tension lines where possible. The area thus outlined was incised deep to adipose tissue with a #15 scalpel blade.  The skin margins were undermined to an appropriate distance in all directions utilizing iris scissors.
Deep Sutures: 5-0 Maxon
Mart-1 - Negative Histology Text: MART-1 staining demonstrates a normal density and pattern of melanocytes along the dermal-epidermal junction. The surgical margins are negative for tumor cells.
Epidermal Sutures: 5-0 Surgipro
Double O-Z Flap Text: The defect edges were debeveled with a #15 scalpel blade.  Given the location of the defect, shape of the defect and the proximity to free margins a Double O-Z flap was deemed most appropriate.  Using a sterile surgical marker, an appropriate transposition flap was drawn incorporating the defect and placing the expected incisions within the relaxed skin tension lines where possible. The area thus outlined was incised deep to adipose tissue with a #15 scalpel blade.  The skin margins were undermined to an appropriate distance in all directions utilizing iris scissors.
Cheiloplasty (Less Than 50%) Text: A decision was made to reconstruct the defect with a  cheiloplasty.  The defect was undermined extensively.  Additional obicularis oris muscle was excised with a 15 blade scalpel.  The defect was converted into a full thickness wedge, of less than 50% of the vertical height of the lip, to facilite a better cosmetic result.  Small vessels were then tied off with 5-0 monocyrl. The obicularis oris, superficial fascia, adipose and dermis were then reapproximated.  After the deeper layers were approximated the epidermis was reapproximated with particular care given to realign the vermilion border.
Cheek-To-Nose Interpolation Flap Text: A decision was made to reconstruct the defect utilizing an interpolation axial flap and a staged reconstruction.  A telfa template was made of the defect.  This telfa template was then used to outline the Cheek-To-Nose Interpolation flap.  The donor area for the pedicle flap was then injected with anesthesia.  The flap was excised through the skin and subcutaneous tissue down to the layer of the underlying musculature.  The interpolation flap was carefully excised within this deep plane to maintain its blood supply.  The edges of the donor site were undermined.   The donor site was closed in a primary fashion.  The pedicle was then rotated into position and sutured.  Once the tube was sutured into place, adequate blood supply was confirmed with blanching and refill.  The pedicle was then wrapped with xeroform gauze and dressed appropriately with a telfa and gauze bandage to ensure continued blood supply and protect the attached pedicle.
Alar Island Pedicle Flap Text: The defect edges were debeveled with a #15 scalpel blade.  Given the location of the defect, shape of the defect and the proximity to the alar rim an island pedicle advancement flap was deemed most appropriate.  Using a sterile surgical marker, an appropriate advancement flap was drawn incorporating the defect, outlining the appropriate donor tissue and placing the expected incisions within the nasal ala running parallel to the alar rim. The area thus outlined was incised with a #15 scalpel blade.  The skin margins were undermined minimally to an appropriate distance in all directions around the primary defect and laterally outward around the island pedicle utilizing iris scissors.  There was minimal undermining beneath the pedicle flap.
Surgeon/Pathologist Verbiage (Will Incorporate Name Of Surgeon From Intro If Not Blank): operated in two distinct and integrated capacities as the surgeon and pathologist.
Bilobed Flap Text: The defect edges were debeveled with a #15 scalpel blade.  Given the location of the defect and the proximity to free margins a bilobe flap was deemed most appropriate.  Using a sterile surgical marker, an appropriate bilobe flap drawn around the defect.    The area thus outlined was incised deep to adipose tissue with a #15 scalpel blade.  The skin margins were undermined to an appropriate distance in all directions utilizing iris scissors.
Staging Info: By selecting yes to the question above you will include information on AJCC 8 tumor staging in your Mohs note. Information on tumor staging will be automatically added for SCCs on the head and neck. AJCC 8 includes tumor size, tumor depth, perineural involvement and bone invasion.
Chonodrocutaneous Helical Advancement Flap Text: The defect edges were debeveled with a #15 scalpel blade.  Given the location of the defect and the proximity to free margins a chondrocutaneous helical advancement flap was deemed most appropriate.  Using a sterile surgical marker, the appropriate advancement flap was drawn incorporating the defect and placing the expected incisions within the relaxed skin tension lines where possible.    The area thus outlined was incised deep to adipose tissue with a #15 scalpel blade.  The skin margins were undermined to an appropriate distance in all directions utilizing iris scissors.
Debridement Text: The wound edges were debrided prior to proceeding with the closure to facilitate wound healing.
Subsequent Stages Histo Method Verbiage: Using a similar technique to that described above, a thin layer of tissue was removed from all areas where tumor was visible on the previous stage.  The tissue was again oriented, mapped, dyed, and processed as above.
Nasalis-Muscle-Based Myocutaneous Island Pedicle Flap Text: Using a #15 blade, an incision was made around the donor flap to the level of the nasalis muscle. Wide lateral undermining was then performed in both the subcutaneous plane above the nasalis muscle, and in a submuscular plane just above periosteum. This allowed the formation of a free nasalis muscle axial pedicle (based on the angular artery) which was still attached to the actual cutaneous flap, increasing its mobility and vascular viability. Hemostasis was obtained with pinpoint electrocoagulation. The flap was mobilized into position and the pivotal anchor points positioned and stabilized with buried interrupted sutures. Subcutaneous and dermal tissues were closed in a multilayered fashion with sutures. Tissue redundancies were excised, and the epidermal edges were apposed without significant tension and sutured with sutures.
Consent Type: Consent 1 (Standard)
Intermediate Repair And Flap Additional Text (Will Appearing After The Standard Complex Repair Text): The intermediate repair was not sufficient to completely close the primary defect. The remaining additional defect was repaired with the flap mentioned below.
Rotation Flap Text: The defect edges were debeveled with a #15 scalpel blade.  Given the location of the defect, shape of the defect and the proximity to free margins a rotation flap was deemed most appropriate.  Using a sterile surgical marker, an appropriate rotation flap was drawn incorporating the defect and placing the expected incisions within the relaxed skin tension lines where possible.    The area thus outlined was incised deep to adipose tissue with a #15 scalpel blade.  The skin margins were undermined to an appropriate distance in all directions utilizing iris scissors.
Crescentic Advancement Flap Text: The defect edges were debeveled with a #15 scalpel blade.  Given the location of the defect and the proximity to free margins a crescentic advancement flap was deemed most appropriate.  Using a sterile surgical marker, the appropriate advancement flap was drawn incorporating the defect and placing the expected incisions within the relaxed skin tension lines where possible.    The area thus outlined was incised deep to adipose tissue with a #15 scalpel blade.  The skin margins were undermined to an appropriate distance in all directions utilizing iris scissors.
Cheiloplasty (Complex) Text: A decision was made to reconstruct the defect with a  cheiloplasty.  The defect was undermined extensively.  Additional obicularis oris muscle was excised with a 15 blade scalpel.  The defect was converted into a full thickness wedge to facilite a better cosmetic result.  Small vessels were then tied off with 5-0 monocyrl. The obicularis oris, superficial fascia, adipose and dermis were then reapproximated.  After the deeper layers were approximated the epidermis was reapproximated with particular care given to realign the vermilion border.
Dermal Autograft Text: The defect edges were debeveled with a #15 scalpel blade.  Given the location of the defect, shape of the defect and the proximity to free margins a dermal autograft was deemed most appropriate.  Using a sterile surgical marker, the primary defect shape was transferred to the donor site. The area thus outlined was incised deep to adipose tissue with a #15 scalpel blade.  The harvested graft was then trimmed of adipose and epidermal tissue until only dermis was left.  The skin graft was then placed in the primary defect and oriented appropriately.
Island Pedicle Flap Text: The defect edges were debeveled with a #15 scalpel blade.  Given the location of the defect, shape of the defect and the proximity to free margins an island pedicle advancement flap was deemed most appropriate.  Using a sterile surgical marker, an appropriate advancement flap was drawn incorporating the defect, outlining the appropriate donor tissue and placing the expected incisions within the relaxed skin tension lines where possible.    The area thus outlined was incised deep to adipose tissue with a #15 scalpel blade.  The skin margins were undermined to an appropriate distance in all directions around the primary defect and laterally outward around the island pedicle utilizing iris scissors.  There was minimal undermining beneath the pedicle flap.
Xenograft Text: The defect edges were debeveled with a #15 scalpel blade.  Given the location of the defect, shape of the defect and the proximity to free margins a xenograft was deemed most appropriate.  The graft was then trimmed to fit the size of the defect.  The graft was then placed in the primary defect and oriented appropriately.
Pain Refusal Text: I offered to prescribe pain medication but the patient refused to take this medication.
Postop Diagnosis: same
Area L Indication Text: Tumors in this location are included in Area L (trunk and extremities).  Mohs surgery is indicated for larger tumors, or tumors with aggressive histologic features, in these anatomic locations.
Spiral Flap Text: The defect edges were debeveled with a #15 scalpel blade.  Given the location of the defect, shape of the defect and the proximity to free margins a spiral flap was deemed most appropriate.  Using a sterile surgical marker, an appropriate rotation flap was drawn incorporating the defect and placing the expected incisions within the relaxed skin tension lines where possible. The area thus outlined was incised deep to adipose tissue with a #15 scalpel blade.  The skin margins were undermined to an appropriate distance in all directions utilizing iris scissors.
Muscle Hinge Flap Text: The defect edges were debeveled with a #15 scalpel blade.  Given the size, depth and location of the defect and the proximity to free margins a muscle hinge flap was deemed most appropriate.  Using a sterile surgical marker, an appropriate hinge flap was drawn incorporating the defect. The area thus outlined was incised with a #15 scalpel blade.  The skin margins were undermined to an appropriate distance in all directions utilizing iris scissors.
Mohs Case Number: [id number]
Advancement Flap (Double) Text: The defect edges were debeveled with a #15 scalpel blade.  Given the location of the defect and the proximity to free margins a double advancement flap was deemed most appropriate.  Using a sterile surgical marker, the appropriate advancement flaps were drawn incorporating the defect and placing the expected incisions within the relaxed skin tension lines where possible.    The area thus outlined was incised deep to adipose tissue with a #15 scalpel blade.  The skin margins were undermined to an appropriate distance in all directions utilizing iris scissors.
Split-Thickness Skin Graft Text: The defect edges were debeveled with a #15 scalpel blade.  Given the location of the defect, shape of the defect and the proximity to free margins a split thickness skin graft was deemed most appropriate.  Using a sterile surgical marker, the primary defect shape was transferred to the donor site. The split thickness graft was then harvested.  The skin graft was then placed in the primary defect and oriented appropriately.
Ear Wedge Repair Text: A wedge excision was completed by carrying down an excision through the full thickness of the ear and cartilage with an inward facing Burow's triangle. The wound was then closed in a layered fashion.
Graft Donor Site Dermal Sutures (Optional): 5-0 Polysorb
Information: Selecting Yes will display possible errors in your note based on the variables you have selected. This validation is only offered as a suggestion for you. PLEASE NOTE THAT THE VALIDATION TEXT WILL BE REMOVED WHEN YOU FINALIZE YOUR NOTE. IF YOU WANT TO FAX A PRELIMINARY NOTE YOU WILL NEED TO TOGGLE THIS TO 'NO' IF YOU DO NOT WANT IT IN YOUR FAXED NOTE.
Complex Repair And Flap Additional Text (Will Appearing After The Standard Complex Repair Text): The complex repair was not sufficient to completely close the primary defect. The remaining additional defect was repaired with the flap mentioned below.
Consent (Spinal Accessory)/Introductory Paragraph: The rationale for Mohs was explained to the patient and consent was obtained. The risks, benefits and alternatives to therapy were discussed in detail. Specifically, the risks of damage to the spinal accessory nerve, infection, scarring, bleeding, prolonged wound healing, incomplete removal, allergy to anesthesia, and recurrence were addressed. Prior to the procedure, the treatment site was clearly identified and confirmed by the patient. All components of Universal Protocol/PAUSE Rule completed.
Bcc Histology Text: There were numerous aggregates of basaloid cells.
Melolabial Transposition Flap Text: The defect edges were debeveled with a #15 scalpel blade.  Given the location of the defect and the proximity to free margins a melolabial flap was deemed most appropriate.  Using a sterile surgical marker, an appropriate melolabial transposition flap was drawn incorporating the defect.    The area thus outlined was incised deep to adipose tissue with a #15 scalpel blade.  The skin margins were undermined to an appropriate distance in all directions utilizing iris scissors.
Consent (Ear)/Introductory Paragraph: The rationale for Mohs was explained to the patient and consent was obtained. The risks, benefits and alternatives to therapy were discussed in detail. Specifically, the risks of ear deformity, infection, scarring, bleeding, prolonged wound healing, incomplete removal, allergy to anesthesia, nerve injury and recurrence were addressed. Prior to the procedure, the treatment site was clearly identified and confirmed by the patient. All components of Universal Protocol/PAUSE Rule completed.
Area H Indication Text: Tumors in this location are included in Area H (eyelids, eyebrows, nose, lips, chin, ear, pre-auricular, post-auricular, temple, genitalia, hands, feet, ankles and areola).  Tissue conservation is critical in these anatomic locations.
Burow's Advancement Flap Text: The defect edges were debeveled with a #15 scalpel blade.  Given the location of the defect and the proximity to free margins a Burow's advancement flap was deemed most appropriate.  Using a sterile surgical marker, the appropriate advancement flap was drawn incorporating the defect and placing the expected incisions within the relaxed skin tension lines where possible.    The area thus outlined was incised deep to adipose tissue with a #15 scalpel blade.  The skin margins were undermined to an appropriate distance in all directions utilizing iris scissors.
Star Wedge Flap Text: The defect edges were debeveled with a #15 scalpel blade.  Given the location of the defect, shape of the defect and the proximity to free margins a star wedge flap was deemed most appropriate.  Using a sterile surgical marker, an appropriate rotation flap was drawn incorporating the defect and placing the expected incisions within the relaxed skin tension lines where possible. The area thus outlined was incised deep to adipose tissue with a #15 scalpel blade.  The skin margins were undermined to an appropriate distance in all directions utilizing iris scissors.
Mohs Method Verbiage: An incision at a 45 degree angle following the standard Mohs approach was done and the specimen was harvested as a microscopic controlled layer.
Brow Lift Text: A midfrontal incision was made medially to the defect to allow access to the tissues just superior to the left eyebrow. Following careful dissection inferiorly in a supraperiosteal plane to the level of the left eyebrow, several 3-0 monocryl sutures were used to resuspend the eyebrow orbicularis oculi muscular unit to the superior frontal bone periosteum. This resulted in an appropriate reapproximation of static eyebrow symmetry and correction of the left brow ptosis.
Where Do You Want The Question To Include Opioid Counseling Located?: Case Summary Tab
Paramedian Forehead Flap Text: A decision was made to reconstruct the defect utilizing an interpolation axial flap and a staged reconstruction.  A telfa template was made of the defect.  This telfa template was then used to outline the paramedian forehead pedicle flap.  The donor area for the pedicle flap was then injected with anesthesia.  The flap was excised through the skin and subcutaneous tissue down to the layer of the underlying musculature.  The pedicle flap was carefully excised within this deep plane to maintain its blood supply.  The edges of the donor site were undermined.   The donor site was closed in a primary fashion.  The pedicle was then rotated into position and sutured.  Once the tube was sutured into place, adequate blood supply was confirmed with blanching and refill.  The pedicle was then wrapped with xeroform gauze and dressed appropriately with a telfa and gauze bandage to ensure continued blood supply and protect the attached pedicle.
Suturegard Retention Suture: 0-0 Nylon
Wound Care (No Sutures): Petrolatum
Undermining Type: Entire Wound
Melolabial Interpolation Flap Text: A decision was made to reconstruct the defect utilizing an interpolation axial flap and a staged reconstruction.  A telfa template was made of the defect.  This telfa template was then used to outline the melolabial interpolation flap.  The donor area for the pedicle flap was then injected with anesthesia.  The flap was excised through the skin and subcutaneous tissue down to the layer of the underlying musculature.  The pedicle flap was carefully excised within this deep plane to maintain its blood supply.  The edges of the donor site were undermined.   The donor site was closed in a primary fashion.  The pedicle was then rotated into position and sutured.  Once the tube was sutured into place, adequate blood supply was confirmed with blanching and refill.  The pedicle was then wrapped with xeroform gauze and dressed appropriately with a telfa and gauze bandage to ensure continued blood supply and protect the attached pedicle.
Surgical Defect Width In Cm (Optional): 0.7
Dressing (No Sutures): pressure dressing with telfa
Graft Donor Site Bandage (Optional-Leave Blank If You Don't Want In Note): Aquaplast was fitted to the graft site and sewn into place. A pressure bandage were applied to the donor site and over the aquaplast bolster.
Consent 3/Introductory Paragraph: I gave the patient a chance to ask questions they had about the procedure.  Following this I explained the Mohs procedure and consent was obtained. The risks, benefits and alternatives to therapy were discussed in detail. Specifically, the risks of infection, scarring, bleeding, prolonged wound healing, incomplete removal, allergy to anesthesia, nerve injury and recurrence were addressed. Prior to the procedure, the treatment site was clearly identified and confirmed by the patient. All components of Universal Protocol/PAUSE Rule completed.
Mustarde Flap Text: The defect edges were debeveled with a #15 scalpel blade.  Given the size, depth and location of the defect and the proximity to free margins a Mustarde flap was deemed most appropriate.  Using a sterile surgical marker, an appropriate flap was drawn incorporating the defect. The area thus outlined was incised with a #15 scalpel blade.  The skin margins were undermined to an appropriate distance in all directions utilizing iris scissors.
Intermediate Repair And Graft Additional Text (Will Appearing After The Standard Complex Repair Text): The intermediate repair was not sufficient to completely close the primary defect. The remaining additional defect was repaired with the graft mentioned below.
Tumor Depth: Less than 6mm from granular layer and no invasion beyond the subcutaneous fat
Double O-Z Plasty Text: The defect edges were debeveled with a #15 scalpel blade.  Given the location of the defect, shape of the defect and the proximity to free margins a Double O-Z plasty (double transposition flap) was deemed most appropriate.  Using a sterile surgical marker, the appropriate transposition flaps were drawn incorporating the defect and placing the expected incisions within the relaxed skin tension lines where possible. The area thus outlined was incised deep to adipose tissue with a #15 scalpel blade.  The skin margins were undermined to an appropriate distance in all directions utilizing iris scissors.  Hemostasis was achieved with electrocautery.  The flaps were then transposed into place, one clockwise and the other counterclockwise, and anchored with interrupted buried subcutaneous sutures.
Composite Graft Text: The defect edges were debeveled with a #15 scalpel blade.  Given the location of the defect, shape of the defect, the proximity to free margins and the fact the defect was full thickness a composite graft was deemed most appropriate.  The defect was outline and then transferred to the donor site.  A full thickness graft was then excised from the donor site. The graft was then placed in the primary defect, oriented appropriately and then sutured into place.  The secondary defect was then repaired using a primary closure.
Retention Suture Text: Retention sutures were placed to support the closure and prevent dehiscence.
Posterior Auricular Interpolation Flap Text: A decision was made to reconstruct the defect utilizing an interpolation axial flap and a staged reconstruction.  A telfa template was made of the defect.  This telfa template was then used to outline the posterior auricular interpolation flap.  The donor area for the pedicle flap was then injected with anesthesia.  The flap was excised through the skin and subcutaneous tissue down to the layer of the underlying musculature.  The pedicle flap was carefully excised within this deep plane to maintain its blood supply.  The edges of the donor site were undermined.   The donor site was closed in a primary fashion.  The pedicle was then rotated into position and sutured.  Once the tube was sutured into place, adequate blood supply was confirmed with blanching and refill.  The pedicle was then wrapped with xeroform gauze and dressed appropriately with a telfa and gauze bandage to ensure continued blood supply and protect the attached pedicle.
Non-Graft Cartilage Fenestration Text: The cartilage was fenestrated with a 2mm punch biopsy to help facilitate healing.
Nostril Rim Text: The closure involved the nostril rim.
Surgical Defect Length In Cm (Optional): 1.3
V-Y Flap Text: The defect edges were debeveled with a #15 scalpel blade.  Given the location of the defect, shape of the defect and the proximity to free margins a V-Y flap was deemed most appropriate.  Using a sterile surgical marker, an appropriate advancement flap was drawn incorporating the defect and placing the expected incisions within the relaxed skin tension lines where possible.    The area thus outlined was incised deep to adipose tissue with a #15 scalpel blade.  The skin margins were undermined to an appropriate distance in all directions utilizing iris scissors.
O-L Flap Text: The defect edges were debeveled with a #15 scalpel blade.  Given the location of the defect, shape of the defect and the proximity to free margins an O-L flap was deemed most appropriate.  Using a sterile surgical marker, an appropriate advancement flap was drawn incorporating the defect and placing the expected incisions within the relaxed skin tension lines where possible.    The area thus outlined was incised deep to adipose tissue with a #15 scalpel blade.  The skin margins were undermined to an appropriate distance in all directions utilizing iris scissors.
Medical Necessity Statement: Based on my medical judgement, Mohs surgery is the most appropriate treatment for this cancer compared to other treatments.
Localized Dermabrasion With Wire Brush Text: The patient was draped in routine manner.  Localized dermabrasion using 3 x 17 mm wire brush was performed in routine manner to papillary dermis. This spot dermabrasion is being performed to complete skin cancer reconstruction. It also will eliminate the other sun damaged precancerous cells that are known to be part of the regional effect of a lifetime's worth of sun exposure. This localized dermabrasion is therapeutic and should not be considered cosmetic in any regard.
Island Pedicle Flap With Canthal Suspension Text: The defect edges were debeveled with a #15 scalpel blade.  Given the location of the defect, shape of the defect and the proximity to free margins an island pedicle advancement flap was deemed most appropriate.  Using a sterile surgical marker, an appropriate advancement flap was drawn incorporating the defect, outlining the appropriate donor tissue and placing the expected incisions within the relaxed skin tension lines where possible. The area thus outlined was incised deep to adipose tissue with a #15 scalpel blade.  The skin margins were undermined to an appropriate distance in all directions around the primary defect and laterally outward around the island pedicle utilizing iris scissors.  There was minimal undermining beneath the pedicle flap. A suspension suture was placed in the canthal tendon to prevent tension and prevent ectropion.
Nasal Turnover Hinge Flap Text: The defect edges were debeveled with a #15 scalpel blade.  Given the size, depth, location of the defect and the defect being full thickness a nasal turnover hinge flap was deemed most appropriate.  Using a sterile surgical marker, an appropriate hinge flap was drawn incorporating the defect. The area thus outlined was incised with a #15 scalpel blade. The flap was designed to recreate the nasal mucosal lining and the alar rim. The skin margins were undermined to an appropriate distance in all directions utilizing iris scissors.
Surgeon Performing Repair (Optional): Citlaly Arredondo MD
Unna Boot Text: An Unna boot was placed to help immobilize the limb and facilitate more rapid healing.
Abbe Flap (Lower To Upper Lip) Text: The defect of the upper lip was assessed and measured.  Given the location and size of the defect, an Abbe flap was deemed most appropriate.  Using a sterile surgical marker, an appropriate Abbe flap was measured and drawn on the lower lip. Local anesthesia was then infiltrated. A scalpel was then used to incise the upper lip through and through the skin, vermilion, muscle and mucosa, leaving the flap pedicled on the opposite side.  The flap was then rotated and transferred to the lower lip defect.  The flap was then sutured into place with a three layer technique, closing the orbicularis oris muscle layer with subcutaneous buried sutures, followed by a mucosal layer and an epidermal layer.
Hatchet Flap Text: The defect edges were debeveled with a #15 scalpel blade.  Given the location of the defect, shape of the defect and the proximity to free margins a hatchet flap was deemed most appropriate.  Using a sterile surgical marker, an appropriate hatchet flap was drawn incorporating the defect and placing the expected incisions within the relaxed skin tension lines where possible.    The area thus outlined was incised deep to adipose tissue with a #15 scalpel blade.  The skin margins were undermined to an appropriate distance in all directions utilizing iris scissors.
Consent (Marginal Mandibular)/Introductory Paragraph: The rationale for Mohs was explained to the patient and consent was obtained. The risks, benefits and alternatives to therapy were discussed in detail. Specifically, the risks of damage to the marginal mandibular branch of the facial nerve, infection, scarring, bleeding, prolonged wound healing, incomplete removal, allergy to anesthesia, and recurrence were addressed. Prior to the procedure, the treatment site was clearly identified and confirmed by the patient. All components of Universal Protocol/PAUSE Rule completed.
Helical Rim Text: The closure involved the helical rim.
Consent (Lip)/Introductory Paragraph: The rationale for Mohs was explained to the patient and consent was obtained. The risks, benefits and alternatives to therapy were discussed in detail. Specifically, the risks of lip deformity, changes in the oral aperture, infection, scarring, bleeding, prolonged wound healing, incomplete removal, allergy to anesthesia, nerve injury and recurrence were addressed. Prior to the procedure, the treatment site was clearly identified and confirmed by the patient. All components of Universal Protocol/PAUSE Rule completed.
W Plasty Text: The lesion was extirpated to the level of the fat with a #15 scalpel blade.  Given the location of the defect, shape of the defect and the proximity to free margins a W-plasty was deemed most appropriate for repair.  Using a sterile surgical marker, the appropriate transposition arms of the W-plasty were drawn incorporating the defect and placing the expected incisions within the relaxed skin tension lines where possible.    The area thus outlined was incised deep to adipose tissue with a #15 scalpel blade.  The skin margins were undermined to an appropriate distance in all directions utilizing iris scissors.  The opposing transposition arms were then transposed into place in opposite direction and anchored with interrupted buried subcutaneous sutures.
Suturegard Body: The suture ends were repeatedly re-tightened and re-clamped to achieve the desired tissue expansion.
Donor Site Anesthesia Type: same as repair anesthesia
O-Z Plasty Text: The defect edges were debeveled with a #15 scalpel blade.  Given the location of the defect, shape of the defect and the proximity to free margins an O-Z plasty (double transposition flap) was deemed most appropriate.  Using a sterile surgical marker, the appropriate transposition flaps were drawn incorporating the defect and placing the expected incisions within the relaxed skin tension lines where possible.    The area thus outlined was incised deep to adipose tissue with a #15 scalpel blade.  The skin margins were undermined to an appropriate distance in all directions utilizing iris scissors.  Hemostasis was achieved with electrocautery.  The flaps were then transposed into place, one clockwise and the other counterclockwise, and anchored with interrupted buried subcutaneous sutures.
Advancement Flap (Single) Text: The defect edges were debeveled with a #15 scalpel blade.  Given the location of the defect and the proximity to free margins a single advancement flap was deemed most appropriate.  Using a sterile surgical marker, an appropriate advancement flap was drawn incorporating the defect and placing the expected incisions within the relaxed skin tension lines where possible.    The area thus outlined was incised deep to adipose tissue with a #15 scalpel blade.  The skin margins were undermined to an appropriate distance in all directions utilizing iris scissors.
Same Histology In Subsequent Stages Text: The pattern and morphology of the tumor is as described in the first stage.
Estlander Flap (Upper To Lower Lip) Text: The defect of the lower lip was assessed and measured.  Given the location and size of the defect, an Estlander flap was deemed most appropriate.  Using a sterile surgical marker, an appropriate Estlander flap was measured and drawn on the upper lip. Local anesthesia was then infiltrated. A scalpel was then used to incise the lateral aspect of the flap, through skin, muscle and mucosa, leaving the flap pedicled medially.  The flap was then rotated and positioned to fill the lower lip defect.  The flap was then sutured into place with a three layer technique, closing the orbicularis oris muscle layer with subcutaneous buried sutures, followed by a mucosal layer and an epidermal layer.
Full Thickness Lip Wedge Repair (Flap) Text: Given the location of the defect and the proximity to free margins a full thickness wedge repair was deemed most appropriate.  Using a sterile surgical marker, the appropriate repair was drawn incorporating the defect and placing the expected incisions perpendicular to the vermilion border.  The vermilion border was also meticulously outlined to ensure appropriate reapproximation during the repair.  The area thus outlined was incised through and through with a #15 scalpel blade.  The muscularis and dermis were reaproximated with deep sutures following hemostasis. Care was taken to realign the vermilion border before proceeding with the superficial closure.  Once the vermilion was realigned the superfical and mucosal closure was finished.
Bilateral Rotation Flap Text: The defect edges were debeveled with a #15 scalpel blade. Given the location of the defect, shape of the defect and the proximity to free margins a bilateral rotation flap was deemed most appropriate. Using a sterile surgical marker, an appropriate rotation flap was drawn incorporating the defect and placing the expected incisions within the relaxed skin tension lines where possible. The area thus outlined was incised deep to adipose tissue with a #15 scalpel blade. The skin margins were undermined to an appropriate distance in all directions utilizing iris scissors. Following this, the designed flap was carried over into the primary defect and sutured into place.
Staged Advancement Flap Text: The defect edges were debeveled with a #15 scalpel blade.  Given the location of the defect, shape of the defect and the proximity to free margins a staged advancement flap was deemed most appropriate.  Using a sterile surgical marker, an appropriate advancement flap was drawn incorporating the defect and placing the expected incisions within the relaxed skin tension lines where possible. The area thus outlined was incised deep to adipose tissue with a #15 scalpel blade.  The skin margins were undermined to an appropriate distance in all directions utilizing iris scissors.
Keystone Flap Text: The defect edges were debeveled with a #15 scalpel blade.  Given the location of the defect, shape of the defect a keystone flap was deemed most appropriate.  Using a sterile surgical marker, an appropriate keystone flap was drawn incorporating the defect, outlining the appropriate donor tissue and placing the expected incisions within the relaxed skin tension lines where possible. The area thus outlined was incised deep to adipose tissue with a #15 scalpel blade.  The skin margins were undermined to an appropriate distance in all directions around the primary defect and laterally outward around the flap utilizing iris scissors.
Additional Anesthesia Volume In Cc: 6
Secondary Intention Text (Leave Blank If You Do Not Want): The defect will heal with secondary intention.
Complex Repair And Graft Additional Text (Will Appearing After The Standard Complex Repair Text): The complex repair was not sufficient to completely close the primary defect. The remaining additional defect was repaired with the graft mentioned below.
Post-Care Instructions: I reviewed with the patient in detail post-care instructions. Patient is not to engage in any heavy lifting, exercise, or swimming for the next 14 days. Should the patient develop any fevers, chills, bleeding, severe pain patient will contact the office immediately.
Hemigard Postcare Instructions: The HEMIGARD strips are to remain completely dry for at least 5-7 days.
Home Suture Removal Text: Patient was provided instructions on removing sutures and will remove their sutures at home.  If they have any questions or difficulties they will call the office.
Cheek Interpolation Flap Text: A decision was made to reconstruct the defect utilizing an interpolation axial flap and a staged reconstruction.  A telfa template was made of the defect.  This telfa template was then used to outline the Cheek Interpolation flap.  The donor area for the pedicle flap was then injected with anesthesia.  The flap was excised through the skin and subcutaneous tissue down to the layer of the underlying musculature.  The interpolation flap was carefully excised within this deep plane to maintain its blood supply.  The edges of the donor site were undermined.   The donor site was closed in a primary fashion.  The pedicle was then rotated into position and sutured.  Once the tube was sutured into place, adequate blood supply was confirmed with blanching and refill.  The pedicle was then wrapped with xeroform gauze and dressed appropriately with a telfa and gauze bandage to ensure continued blood supply and protect the attached pedicle.
Undermining Location (Optional): in the superficial subcutaneous fat
No Repair - Repaired With Adjacent Surgical Defect Text (Leave Blank If You Do Not Want): After obtaining clear surgical margins the defect was repaired concurrently with another surgical defect which was in close approximation.
Double Z Plasty Text: The lesion was extirpated to the level of the fat with a #15 scalpel blade. Given the location of the defect, shape of the defect and the proximity to free margins a double Z-plasty was deemed most appropriate for repair. Using a sterile surgical marker, the appropriate transposition arms of the double Z-plasty were drawn incorporating the defect and placing the expected incisions within the relaxed skin tension lines where possible. The area thus outlined was incised deep to adipose tissue with a #15 scalpel blade. The skin margins were undermined to an appropriate distance in all directions utilizing iris scissors. The opposing transposition arms were then transposed and carried over into place in opposite direction and anchored with interrupted buried subcutaneous sutures.
Purse String (Simple) Text: Given the location of the defect and the characteristics of the surrounding skin a purse string closure was deemed most appropriate.  Undermining was performed circumfirentially around the surgical defect.  A purse string suture was then placed and tightened.
Zygomaticofacial Flap Text: Given the location of the defect, shape of the defect and the proximity to free margins a zygomaticofacial flap was deemed most appropriate for repair.  Using a sterile surgical marker, the appropriate flap was drawn incorporating the defect and placing the expected incisions within the relaxed skin tension lines where possible. The area thus outlined was incised deep to adipose tissue with a #15 scalpel blade with preservation of a vascular pedicle.  The skin margins were undermined to an appropriate distance in all directions utilizing iris scissors.  The flap was then placed into the defect and anchored with interrupted buried subcutaneous sutures.
Flap Type: Rotation Flap
Mohs Histo Method Verbiage: Each section was then chromacoded and processed in the Mohs lab using the Mohs protocol and submitted for frozen section.
Partial Purse String (Simple) Text: Given the location of the defect and the characteristics of the surrounding skin a simple purse string closure was deemed most appropriate.  Undermining was performed circumfirentially around the surgical defect.  A purse string suture was then placed and tightened. Wound tension only allowed a partial closure of the circular defect.
Number Of Stages: 2

## 2023-09-06 ENCOUNTER — SPEECH THERAPY (OUTPATIENT)
Dept: SPEECH THERAPY | Facility: OTHER | Age: 78
End: 2023-09-06
Payer: COMMERCIAL

## 2023-09-06 DIAGNOSIS — G20.A1 PARKINSON DISEASE (HCC): ICD-10-CM

## 2023-09-06 PROCEDURE — 92508 TX SP LANG VOICE COMM GROUP: CPT | Mod: GN,GC | Performed by: SPEECH-LANGUAGE PATHOLOGIST

## 2023-09-06 NOTE — OP THERAPY DAILY TREATMENT
"  Outpatient Speech Therapy  DAILY TREATMENT     [facility] Speech Pathology & Audiology  [address] [place] [address]  Phone:  [phone]  Fax:  [phone]    Date: 09/06/2023    Patient: Laci Baugh  YOB: 1945  MRN: [id number]     Time Calculation    Start time: 1000  Stop time: 1055 Time Calculation (min): 55 minutes         Chief Complaint: Speech Therapy    Visit #: 13    Subjective Evaluation  Mr. Baugh arrived on time and independently. Mr. Baugh was tangential and periodically required redirection throughout the session.     Speech Therapy Objective   1. Building Rapport  Today's session focused on building rapport between Mr. Baugh and the other group members. Pragmatics were also informally assessed.     2. Cues Required  Mr. Baugh participated in a 50 minute conversation  with the other group member and clinicians. He required five moderate (verbal and visual) cues in conversation to increase loudness from his conversation partner. He required two verbal cues from the clinician for appropriate pragmatic behavior (turn-taking).    Assessments  Mr. Baugh presents with Parkinson's disease and a diagnosis of mild hypokinetic dysarthria characterized by weak breathy voice, low volume, and variable rate of speech.     Mr. Baugh participated in a group session to generalize strategies learned in previous one-on-one therapy and to provide natural conversational cues. He was receptive to visual cues (e.g. leaning in, furrowing eyebrows) and verbal cues (e.g. \"What?\") from his conversational partners. Mr. Baugh also required verbal cues (e.g. \"Laci, who should we ask next?\") for appropriate pragmatic behaviors.    Speech Therapy Plan  Mr. Baugh will return to speech therapy on 9/11/2023 and the session will focus on his swallowing concerns.    Mr. Baugh will return to speech therapy on 9/13/2023 for the Parkinson's group.     Visit #: 13 out of 20    [x] As the " licensed therapist supervising this student, I was present during the entire treatment session directing the care and reviewing the assessment plan.  I reviewed all documentation prior to signing.

## 2023-09-08 ENCOUNTER — APPOINTMENT (RX ONLY)
Dept: URBAN - METROPOLITAN AREA CLINIC 36 | Facility: CLINIC | Age: 78
Setting detail: DERMATOLOGY
End: 2023-09-08

## 2023-09-08 DIAGNOSIS — Z48.02 ENCOUNTER FOR REMOVAL OF SUTURES: ICD-10-CM

## 2023-09-08 PROCEDURE — ? SUTURE REMOVAL (GLOBAL PERIOD)

## 2023-09-08 ASSESSMENT — LOCATION DETAILED DESCRIPTION DERM: LOCATION DETAILED: RIGHT SUPERIOR MEDIAL MALAR CHEEK

## 2023-09-08 ASSESSMENT — LOCATION ZONE DERM: LOCATION ZONE: FACE

## 2023-09-08 ASSESSMENT — LOCATION SIMPLE DESCRIPTION DERM: LOCATION SIMPLE: RIGHT CHEEK

## 2023-09-08 NOTE — PROCEDURE: SUTURE REMOVAL (GLOBAL PERIOD)
Detail Level: Detailed
Add 65089 Cpt? (Important Note: In 2017 The Use Of 49428 Is Being Tracked By Cms To Determine Future Global Period Reimbursement For Global Periods): no

## 2023-09-11 ENCOUNTER — SPEECH THERAPY (OUTPATIENT)
Dept: SPEECH THERAPY | Facility: OTHER | Age: 78
End: 2023-09-11
Payer: COMMERCIAL

## 2023-09-11 DIAGNOSIS — G20.A1 HYPOKINETIC PARKINSONIAN DYSPHONIA (HCC): ICD-10-CM

## 2023-09-11 DIAGNOSIS — R49.0 HYPOKINETIC PARKINSONIAN DYSPHONIA (HCC): ICD-10-CM

## 2023-09-11 PROCEDURE — 92610 EVALUATE SWALLOWING FUNCTION: CPT | Mod: GN,GC | Performed by: SPEECH-LANGUAGE PATHOLOGIST

## 2023-09-11 NOTE — OP THERAPY DAILY TREATMENT
"  Outpatient Speech Therapy  DAILY TREATMENT     [facility] Speech Pathology & Audiology  [address] [facility] [address]  Phone:  [phone]  Fax:  [phone]    Date: 09/11/2023    Patient: Laci Baugh  YOB: 1945  MRN: [id number]     Time Calculation    Start time: 1000  Stop time: 1055 Time Calculation (min): 55 minutes         Chief Complaint: Speech Therapy    Visit #: 14    Subjective Evaluation  Laci arrived on time to his session and greeted the two clinicians. He stated he was, \"Having a hard day\" and his hearing aids were not working correctly. The clinicians assured him that it was fine and to do his best on the presented tasks. Laci was tangential and periodically required redirection throughout the session to stay on task    Speech Therapy Objective   A clinical swallow assessment was conducted due to Laci's reported difficulties swallowing.    Swallowing Questionnaire  Laci stated that his swallowing difficulty began when he was 74 but has slowly worsened over time as it is not being treated. He said food gets lodged in his throat and in early Fall of 2022, he had to throw up food that he felt was lodged in his throat. Laci states that his swallowing problem is related to his diagnosis of Parkinson's. He has had multiple instances of food \"going down the wrong way\" and he avoids lemonade because he chokes on it.     2. Oral-Motor Evaluation  Laci presented with intact upper and lower teeth, no apparent dental cavities, no inflammation around teeth, and no decayed teeth. He did not present with drooling, excess secretions, or wet breath sounds.  His jaw movements and strength are within normal limits. Labial function was typical except for limited right side lip retraction and limited bilateral rapid labial protrusion/retraction. Fasciculations were noted during tongue protrusion. Tongue strength was moderately weak on both sides when presented with resistance. " "Elevated tongue strength with resistance was within functional limits. Soft palate function and cheek function were within normal limits.     3. Eating Assessment Tool (EAT-10)  The EAT-10 was administered to evaluate Laci's feelings towards his swallow. The EAT-10 is scored out of 40, he received a score of 16. A score of 3 or higher is considered dysphagic. Laci scored a 3 on the following, \"The pleasure of eating is affected by my swallowing\" and \"When I swallow food sticks in my throat\". He responded a 2 on the question, \"My swallowing problem interferes with my ability to go out for meals\".     4. [employer] Swallow Protocol  Laci completed the protocol by uninterruptedly drinking 3 ounces of water without overt signs of aspiration (i.e. coughing or choking during or after completion) resulting in a pass.    5. Test of Mastication and Swallowing Solids (TOMASS)  The data for the TOMASS is as follows.Only the data from the first trial is compared against normative data.    Data Points Trial 1 Trial 2 Results   Number of masticatory cycles 38 49 Within normal limits   Number of swallows per whole cracker 4 4  1 Standard Deviation below the mean for his age   Total time to complete the whole cracker 1:37 0:57 More than 1 Standard Deviation below the mean for his age     During the TOMASS, Laci needed frequent verbal cues to swallow the bolus and would hold food in his mouth. On the first trial he stated the cracker got stuck to the roof of his mouth and he needed water in order to swallow it. After swallowing with the aid of water, Laci started coughing, clearing his throat, and dabbing his eyes.     Assessments  Laci presents with a possible mild dysphagia characterized by coughing, watery eyes, and indigestion after eating. Further evaluation is needed through instrumentation to confirm this.     Speech Therapy Plan  Laci will return to speech therapy on 9/13/23 to focus on his mild hypokinetic dysarthria in a group " session with another client with Parkinsons. Laci has an appointment scheduled for a FEES on 10/03/2023 to take a further look at his swallowing difficulties.     Visit #: 14 out of 20    [x] As the licensed therapist supervising this student, I was present during the entire treatment session directing the care and reviewing the assessment plan.  I reviewed all documentation prior to signing.

## 2023-09-13 ENCOUNTER — SPEECH THERAPY (OUTPATIENT)
Dept: SPEECH THERAPY | Facility: OTHER | Age: 78
End: 2023-09-13
Payer: COMMERCIAL

## 2023-09-13 DIAGNOSIS — G20.A1 PARKINSON DISEASE (HCC): ICD-10-CM

## 2023-09-13 PROCEDURE — 92508 TX SP LANG VOICE COMM GROUP: CPT | Mod: GN,GC | Performed by: SPEECH-LANGUAGE PATHOLOGIST

## 2023-09-13 NOTE — OP THERAPY DAILY TREATMENT
"  Outpatient Speech Therapy  DAILY TREATMENT     [facility] Speech Pathology & Audiology  [address] [place] [address]  Phone:  [phone]  Fax:  [phone]    Date: 09/13/2023    Patient: Laci Baugh  YOB: 1945  MRN: [id number]     Time Calculation    Start time: 1000  Stop time: 1100 Time Calculation (min): 60 minutes         Chief Complaint: Speech Therapy    Visit #: 15    Subjective Evaluation  Mr. Baugh arrived on time and independently. Mr. Baugh required minimal redirections throughout the session.    Speech Therapy Objective   1. Cues from conversation partners  Mr. Baugh participated in a 55 minute conversation with the other group member and clinicians. He required three verbal cues in conversation to increase loudness from his conversation partner.     2. dB readings in conversation  Several dB readings were taken to determine Mr. Rodriguez's average conversational volume. Mr. Baugh achieved the following:    Trial 1 50 dB   Trial 2 53 dB   Trial 3 55 dB   Trial 4 50 dB   Trial 5 56 dB   Trial 6 61 dB   Trial 7 60 dB   Average 55 dB     3. Redirections for pragmatic behavior  Mr. Baugh required two verbal cues from the clinician and other group members for appropriate pragmatic behavior (turn-taking).    Assessments  Mr. Baugh presents with Parkinson's disease and a diagnosis of mild hypokinetic dysarthria characterized by weak breathy voice, low volume, and variable rate of speech.      Mr. Baugh participated in a group session to generalize strategies learned in previous one-on-one therapy and to provide natural conversational cues. He was receptive to visual cues (e.g. leaning in, furrowing eyebrows) and verbal cues (e.g. \"What?\") from his conversational partners. Mr. Baugh also required verbal cues (e.g. \"Hold on just a minute.\") for appropriate pragmatic behaviors.    Speech Therapy Plan  Mr. Baugh will return to speech therapy on 9/20/2023 for the " Parkinson's group.    Visit 15 out of 20    [x] As the licensed therapist supervising this student, I was present during the entire treatment session directing the care and reviewing the assessment plan.  I reviewed all documentation prior to signing.

## 2023-09-20 ENCOUNTER — SPEECH THERAPY (OUTPATIENT)
Dept: SPEECH THERAPY | Facility: OTHER | Age: 78
End: 2023-09-20
Payer: COMMERCIAL

## 2023-09-20 DIAGNOSIS — G20.A1 PARKINSON DISEASE (HCC): ICD-10-CM

## 2023-09-20 PROCEDURE — 92508 TX SP LANG VOICE COMM GROUP: CPT | Mod: GN,GC | Performed by: SPEECH-LANGUAGE PATHOLOGIST

## 2023-09-20 NOTE — OP THERAPY DAILY TREATMENT
Outpatient Speech Therapy  DAILY TREATMENT     [facility] Speech Pathology & Audiology  [address] [place] [address]  Phone:  [phone]  Fax:  [phone]    Date: 09/20/2023    Patient: Laci Baugh  YOB: 1945  MRN: [id number]     Time Calculation    Start time: 1000  Stop time: 1050 Time Calculation (min): 50 minutes         Chief Complaint: Speech Therapy    Visit #: 16    Subjective Evaluation  Mr. Baugh arrived on time and independently. Mr. Baugh required minimal redirections throughout the session.    Speech Therapy Objective   1. Cues from conversation partners  Mr. Baugh participated in a 55 minute conversation with the other group member and clinicians. He required one verbal cues in conversation to increase loudness from his conversation partner.     2. dB readings in conversation  Several dB readings were taken to determine Mr. Baugh's average conversational volume. Mr. Baugh achieved the following:    Trial 1 66 dB Trial 9 70 dB   Trial 2 70 dB Trial 10 69 dB   Trial 3 75 dB Trial 11 63 dB   Trial 4 72 dB Trial  12 65 dB   Trial 5 71 dB Trial 13 73 dB   Trial 6 81 dB Trial  14 72 dB   Trial 7 74 dB Trial 15  77 dB   Trial 8 69 dB Trial 16 74 dB   Average 68 dB     Mr. Baugh's loudness improved by 13 dB since last session. He benefited from being able to see the dB meter for visual feedback.    3. Redirections for pragmatic behavior  Mr. Baugh required one verbal cue from the clinician for appropriate pragmatic behavior (turn-taking). He demonstrated increased self awareness of pragmatic behavior by requesting that the clinician redirect him to include the other group member more in future sessions.     Assessments  Mr. Baugh presents with Parkinson's disease and a diagnosis of mild hypokinetic dysarthria characterized by weak breathy voice, low volume, and variable rate of speech.      Mr. Baugh participated in a group session to generalize strategies  "learned in previous one-on-one therapy and to provide natural conversational cues. He was receptive to visual and verbal cues (e.g. \"What?\") from his conversational partners. Mr. Baugh also required verbal cues for appropriate pragmatic behaviors.    Speech Therapy Plan  Mr. Baugh will return to speech therapy on 10/4/2023 for the Parkinson's group.    Visit #16 out of 20  [x] As the licensed therapist supervising this student, I was present during the entire treatment session directing the care and reviewing the assessment plan.  I reviewed all documentation prior to signing.             "

## 2023-10-02 ENCOUNTER — APPOINTMENT (RX ONLY)
Dept: URBAN - METROPOLITAN AREA CLINIC 6 | Facility: CLINIC | Age: 78
Setting detail: DERMATOLOGY
End: 2023-10-02

## 2023-10-02 DIAGNOSIS — L57.0 ACTINIC KERATOSIS: ICD-10-CM

## 2023-10-02 PROBLEM — C44.529 SQUAMOUS CELL CARCINOMA OF SKIN OF OTHER PART OF TRUNK: Status: ACTIVE | Noted: 2023-10-02

## 2023-10-02 PROCEDURE — ? LIQUID NITROGEN

## 2023-10-02 PROCEDURE — ? COUNSELING

## 2023-10-02 PROCEDURE — 12034 INTMD RPR S/TR/EXT 7.6-12.5: CPT | Mod: 79,59

## 2023-10-02 PROCEDURE — ? EXCISION

## 2023-10-02 PROCEDURE — 11603 EXC TR-EXT MAL+MARG 2.1-3 CM: CPT | Mod: 79

## 2023-10-02 PROCEDURE — 17000 DESTRUCT PREMALG LESION: CPT | Mod: 79,59

## 2023-10-02 PROCEDURE — ? ADDITIONAL NOTES

## 2023-10-02 ASSESSMENT — LOCATION ZONE DERM: LOCATION ZONE: LEG

## 2023-10-02 ASSESSMENT — LOCATION DETAILED DESCRIPTION DERM: LOCATION DETAILED: LEFT DISTAL PRETIBIAL REGION

## 2023-10-02 ASSESSMENT — LOCATION SIMPLE DESCRIPTION DERM: LOCATION SIMPLE: LEFT PRETIBIAL REGION

## 2023-10-02 NOTE — PROCEDURE: ADDITIONAL NOTES
Additional Notes: [id number] Bx proven HAK. Treated with LN2. Will continue to monitor.
Detail Level: Simple
Render Risk Assessment In Note?: no

## 2023-10-02 NOTE — PROCEDURE: EXCISION
Surgeon (Optional): Dr. Simmons
Biopsy Photograph Reviewed: Yes
Accession #: [id number]
Date Of Previous Biopsy (Optional): 07/10/2023
Size Of Lesion In Cm: 1.6
X Size Of Lesion In Cm (Optional): 0.7
Size Of Margin In Cm: 0.5
Anesthesia Volume In Cc: 6
Was An Eye Clamp Used?: No
Eye Clamp Note Details: An eye clamp was used during the procedure.
Excision Method: Elliptical
Saucerization Depth: dermis and superficial adipose tissue
Repair Type: Intermediate
Suturegard Retention Suture: 2-0 Nylon
Retention Suture Bite Size: 3 mm
Length To Time In Minutes Device Was In Place: 10
Number Of Hemigard Strips Per Side: 1
Intermediate / Complex Repair - Final Wound Length In Cm: 8
Undermining Type: Entire Wound
Debridement Text: The wound edges were debrided prior to proceeding with the closure to facilitate wound healing.
Helical Rim Text: The closure involved the helical rim.
Vermilion Border Text: The closure involved the vermilion border.
Nostril Rim Text: The closure involved the nostril rim.
Retention Suture Text: Retention sutures were placed to support the closure and prevent dehiscence.
Primary Defect Length (In Cm): 0
Lab: [id number]
Lab Facility: 
Graft Donor Site Bandage (Optional-Leave Blank If You Don't Want In Note): Steri-strips and a pressure bandage were applied to the donor site.
Epidermal Closure Graft Donor Site (Optional): simple interrupted
Billing Type: Third-Party Bill
Excision Depth: adipose tissue
Scalpel Size: 15 blade
Anesthesia Type: 1% lidocaine with epinephrine and a 1:10 solution of 8.4% sodium bicarbonate
Hemostasis: Electrodesiccation
Estimated Blood Loss (Cc): minimal
Detail Level: Detailed
Deep Sutures: 4-0 Monocryl
Dermal Closure: buried vertical mattress
Epidermal Sutures: 4-0 Surgipro
Epidermal Closure: running subcuticular
Wound Care: Vaseline
Dressing: pressure dressing
Suturegard Intro: Intraoperative tissue expansion was performed, utilizing the SUTUREGARD device, in order to reduce wound tension.
Suturegard Body: The suture ends were repeatedly re-tightened and re-clamped to achieve the desired tissue expansion.
Hemigard Intro: Due to skin fragility and wound tension, it was decided to use HEMIGARD adhesive retention suture devices to permit a linear closure. The skin was cleaned and dried for a 6cm distance away from the wound. Excessive hair, if present, was removed to allow for adhesion.
Hemigard Postcare Instructions: The HEMIGARD strips are to remain completely dry for at least 5-7 days.
Positioning (Leave Blank If You Do Not Want): The patient was placed in a comfortable position exposing the surgical site.
Pre-Excision Curettage Text (Leave Blank If You Do Not Want): Prior to drawing the surgical margin the visible lesion was removed with electrodesiccation and curettage to clearly define the lesion size.
Complex Repair Preamble Text (Leave Blank If You Do Not Want): Extensive wide undermining was performed.
Intermediate Repair Preamble Text (Leave Blank If You Do Not Want): Undermining was performed with blunt dissection.
Curvilinear Excision Additional Text (Leave Blank If You Do Not Want): The margin was drawn around the clinically apparent lesion.  A curvilinear shape was then drawn on the skin incorporating the lesion and margins.  Incisions were then made along these lines to the appropriate tissue plane and the lesion was extirpated.
Fusiform Excision Additional Text (Leave Blank If You Do Not Want): The margin was drawn around the clinically apparent lesion.  A fusiform shape was then drawn on the skin incorporating the lesion and margins.  Incisions were then made along these lines to the appropriate tissue plane and the lesion was extirpated.
Elliptical Excision Additional Text (Leave Blank If You Do Not Want): The margin was drawn around the clinically apparent lesion.  An elliptical shape was then drawn on the skin incorporating the lesion and margins.  Incisions were then made along these lines to the appropriate tissue plane and the lesion was extirpated.
Saucerization Excision Additional Text (Leave Blank If You Do Not Want): The margin was drawn around the clinically apparent lesion.  Incisions were then made along these lines, in a tangential fashion, to the appropriate tissue plane and the lesion was extirpated.
Slit Excision Additional Text (Leave Blank If You Do Not Want): A linear line was drawn on the skin overlying the lesion. An incision was made slowly until the lesion was visualized.  Once visualized, the lesion was removed with blunt dissection.
Excisional Biopsy Additional Text (Leave Blank If You Do Not Want): The margin was drawn around the clinically apparent lesion. An elliptical shape was then drawn on the skin incorporating the lesion and margins.  Incisions were then made along these lines to the appropriate tissue plane and the lesion was extirpated.
Perilesional Excision Additional Text (Leave Blank If You Do Not Want): The margin was drawn around the clinically apparent lesion. Incisions were then made along these lines to the appropriate tissue plane and the lesion was extirpated.
Repair Performed By Another Provider Text (Leave Blank If You Do Not Want): After the tissue was excised the defect was repaired by another provider.
No Repair - Repaired With Adjacent Surgical Defect Text (Leave Blank If You Do Not Want): After the excision the defect was repaired concurrently with another surgical defect which was in close approximation.
Adjacent Tissue Transfer Text: The defect edges were debeveled with a #15 scalpel blade. Given the location of the defect and the proximity to free margins an adjacent tissue transfer was deemed most appropriate. Using a sterile surgical marker, an appropriate flap was drawn incorporating the defect and placing the expected incisions within the relaxed skin tension lines where possible. The area thus outlined was incised deep to adipose tissue with a #15 scalpel blade. The skin margins were undermined to an appropriate distance in all directions utilizing iris scissors and carried over to close the primary defect.
Advancement Flap (Single) Text: The defect edges were debeveled with a #15 scalpel blade.  Given the location of the defect and the proximity to free margins a single advancement flap was deemed most appropriate.  Using a sterile surgical marker, an appropriate advancement flap was drawn incorporating the defect and placing the expected incisions within the relaxed skin tension lines where possible.    The area thus outlined was incised deep to adipose tissue with a #15 scalpel blade.  The skin margins were undermined to an appropriate distance in all directions utilizing iris scissors.
Advancement Flap (Double) Text: The defect edges were debeveled with a #15 scalpel blade.  Given the location of the defect and the proximity to free margins a double advancement flap was deemed most appropriate.  Using a sterile surgical marker, the appropriate advancement flaps were drawn incorporating the defect and placing the expected incisions within the relaxed skin tension lines where possible.    The area thus outlined was incised deep to adipose tissue with a #15 scalpel blade.  The skin margins were undermined to an appropriate distance in all directions utilizing iris scissors.
Burow's Advancement Flap Text: The defect edges were debeveled with a #15 scalpel blade.  Given the location of the defect and the proximity to free margins a Burow's advancement flap was deemed most appropriate.  Using a sterile surgical marker, the appropriate advancement flap was drawn incorporating the defect and placing the expected incisions within the relaxed skin tension lines where possible.    The area thus outlined was incised deep to adipose tissue with a #15 scalpel blade.  The skin margins were undermined to an appropriate distance in all directions utilizing iris scissors.
Chonodrocutaneous Helical Advancement Flap Text: The defect edges were debeveled with a #15 scalpel blade.  Given the location of the defect and the proximity to free margins a chondrocutaneous helical advancement flap was deemed most appropriate.  Using a sterile surgical marker, the appropriate advancement flap was drawn incorporating the defect and placing the expected incisions within the relaxed skin tension lines where possible.    The area thus outlined was incised deep to adipose tissue with a #15 scalpel blade.  The skin margins were undermined to an appropriate distance in all directions utilizing iris scissors.
Crescentic Advancement Flap Text: The defect edges were debeveled with a #15 scalpel blade.  Given the location of the defect and the proximity to free margins a crescentic advancement flap was deemed most appropriate.  Using a sterile surgical marker, the appropriate advancement flap was drawn incorporating the defect and placing the expected incisions within the relaxed skin tension lines where possible.    The area thus outlined was incised deep to adipose tissue with a #15 scalpel blade.  The skin margins were undermined to an appropriate distance in all directions utilizing iris scissors.
A-T Advancement Flap Text: The defect edges were debeveled with a #15 scalpel blade.  Given the location of the defect, shape of the defect and the proximity to free margins an A-T advancement flap was deemed most appropriate.  Using a sterile surgical marker, an appropriate advancement flap was drawn incorporating the defect and placing the expected incisions within the relaxed skin tension lines where possible.    The area thus outlined was incised deep to adipose tissue with a #15 scalpel blade.  The skin margins were undermined to an appropriate distance in all directions utilizing iris scissors.
O-T Advancement Flap Text: The defect edges were debeveled with a #15 scalpel blade.  Given the location of the defect, shape of the defect and the proximity to free margins an O-T advancement flap was deemed most appropriate.  Using a sterile surgical marker, an appropriate advancement flap was drawn incorporating the defect and placing the expected incisions within the relaxed skin tension lines where possible.    The area thus outlined was incised deep to adipose tissue with a #15 scalpel blade.  The skin margins were undermined to an appropriate distance in all directions utilizing iris scissors.
O-L Flap Text: The defect edges were debeveled with a #15 scalpel blade.  Given the location of the defect, shape of the defect and the proximity to free margins an O-L flap was deemed most appropriate.  Using a sterile surgical marker, an appropriate advancement flap was drawn incorporating the defect and placing the expected incisions within the relaxed skin tension lines where possible.    The area thus outlined was incised deep to adipose tissue with a #15 scalpel blade.  The skin margins were undermined to an appropriate distance in all directions utilizing iris scissors.
O-Z Flap Text: The defect edges were debeveled with a #15 scalpel blade.  Given the location of the defect, shape of the defect and the proximity to free margins an O-Z flap was deemed most appropriate.  Using a sterile surgical marker, an appropriate transposition flap was drawn incorporating the defect and placing the expected incisions within the relaxed skin tension lines where possible. The area thus outlined was incised deep to adipose tissue with a #15 scalpel blade.  The skin margins were undermined to an appropriate distance in all directions utilizing iris scissors.
Double O-Z Flap Text: The defect edges were debeveled with a #15 scalpel blade.  Given the location of the defect, shape of the defect and the proximity to free margins a Double O-Z flap was deemed most appropriate.  Using a sterile surgical marker, an appropriate transposition flap was drawn incorporating the defect and placing the expected incisions within the relaxed skin tension lines where possible. The area thus outlined was incised deep to adipose tissue with a #15 scalpel blade.  The skin margins were undermined to an appropriate distance in all directions utilizing iris scissors.
V-Y Flap Text: The defect edges were debeveled with a #15 scalpel blade.  Given the location of the defect, shape of the defect and the proximity to free margins a V-Y flap was deemed most appropriate.  Using a sterile surgical marker, an appropriate advancement flap was drawn incorporating the defect and placing the expected incisions within the relaxed skin tension lines where possible.    The area thus outlined was incised deep to adipose tissue with a #15 scalpel blade.  The skin margins were undermined to an appropriate distance in all directions utilizing iris scissors.
Advancement-Rotation Flap Text: The defect edges were debeveled with a #15 scalpel blade.  Given the location of the defect, shape of the defect and the proximity to free margins an advancement-rotation flap was deemed most appropriate.  Using a sterile surgical marker, an appropriate flap was drawn incorporating the defect and placing the expected incisions within the relaxed skin tension lines where possible. The area thus outlined was incised deep to adipose tissue with a #15 scalpel blade.  The skin margins were undermined to an appropriate distance in all directions utilizing iris scissors.
Mercedes Flap Text: The defect edges were debeveled with a #15 scalpel blade.  Given the location of the defect, shape of the defect and the proximity to free margins a Mercedes flap was deemed most appropriate.  Using a sterile surgical marker, an appropriate advancement flap was drawn incorporating the defect and placing the expected incisions within the relaxed skin tension lines where possible. The area thus outlined was incised deep to adipose tissue with a #15 scalpel blade.  The skin margins were undermined to an appropriate distance in all directions utilizing iris scissors.
Modified Advancement Flap Text: The defect edges were debeveled with a #15 scalpel blade.  Given the location of the defect, shape of the defect and the proximity to free margins a modified advancement flap was deemed most appropriate.  Using a sterile surgical marker, an appropriate advancement flap was drawn incorporating the defect and placing the expected incisions within the relaxed skin tension lines where possible.    The area thus outlined was incised deep to adipose tissue with a #15 scalpel blade.  The skin margins were undermined to an appropriate distance in all directions utilizing iris scissors.
Mucosal Advancement Flap Text: Given the location of the defect, shape of the defect and the proximity to free margins a mucosal advancement flap was deemed most appropriate. Incisions were made with a 15 blade scalpel in the appropriate fashion along the cutaneous vermilion border and the mucosal lip. The remaining actinically damaged mucosal tissue was excised.  The mucosal advancement flap was then elevated to the gingival sulcus with care taken to preserve the neurovascular structures and advanced into the primary defect. Care was taken to ensure that precise realignment of the vermilion border was achieved.
Peng Advancement Flap Text: The defect edges were debeveled with a #15 scalpel blade.  Given the location of the defect, shape of the defect and the proximity to free margins a Peng advancement flap was deemed most appropriate.  Using a sterile surgical marker, an appropriate advancement flap was drawn incorporating the defect and placing the expected incisions within the relaxed skin tension lines where possible. The area thus outlined was incised deep to adipose tissue with a #15 scalpel blade.  The skin margins were undermined to an appropriate distance in all directions utilizing iris scissors.
Hatchet Flap Text: The defect edges were debeveled with a #15 scalpel blade.  Given the location of the defect, shape of the defect and the proximity to free margins a hatchet flap was deemed most appropriate.  Using a sterile surgical marker, an appropriate hatchet flap was drawn incorporating the defect and placing the expected incisions within the relaxed skin tension lines where possible.    The area thus outlined was incised deep to adipose tissue with a #15 scalpel blade.  The skin margins were undermined to an appropriate distance in all directions utilizing iris scissors.
Rotation Flap Text: The defect edges were debeveled with a #15 scalpel blade.  Given the location of the defect, shape of the defect and the proximity to free margins a rotation flap was deemed most appropriate.  Using a sterile surgical marker, an appropriate rotation flap was drawn incorporating the defect and placing the expected incisions within the relaxed skin tension lines where possible.    The area thus outlined was incised deep to adipose tissue with a #15 scalpel blade.  The skin margins were undermined to an appropriate distance in all directions utilizing iris scissors.
Bilateral Rotation Flap Text: The defect edges were debeveled with a #15 scalpel blade. Given the location of the defect, shape of the defect and the proximity to free margins a bilateral rotation flap was deemed most appropriate. Using a sterile surgical marker, an appropriate rotation flap was drawn incorporating the defect and placing the expected incisions within the relaxed skin tension lines where possible. The area thus outlined was incised deep to adipose tissue with a #15 scalpel blade. The skin margins were undermined to an appropriate distance in all directions utilizing iris scissors. Following this, the designed flap was carried over into the primary defect and sutured into place.
Spiral Flap Text: The defect edges were debeveled with a #15 scalpel blade.  Given the location of the defect, shape of the defect and the proximity to free margins a spiral flap was deemed most appropriate.  Using a sterile surgical marker, an appropriate rotation flap was drawn incorporating the defect and placing the expected incisions within the relaxed skin tension lines where possible. The area thus outlined was incised deep to adipose tissue with a #15 scalpel blade.  The skin margins were undermined to an appropriate distance in all directions utilizing iris scissors.
Staged Advancement Flap Text: The defect edges were debeveled with a #15 scalpel blade.  Given the location of the defect, shape of the defect and the proximity to free margins a staged advancement flap was deemed most appropriate.  Using a sterile surgical marker, an appropriate advancement flap was drawn incorporating the defect and placing the expected incisions within the relaxed skin tension lines where possible. The area thus outlined was incised deep to adipose tissue with a #15 scalpel blade.  The skin margins were undermined to an appropriate distance in all directions utilizing iris scissors.
Star Wedge Flap Text: The defect edges were debeveled with a #15 scalpel blade.  Given the location of the defect, shape of the defect and the proximity to free margins a star wedge flap was deemed most appropriate.  Using a sterile surgical marker, an appropriate rotation flap was drawn incorporating the defect and placing the expected incisions within the relaxed skin tension lines where possible. The area thus outlined was incised deep to adipose tissue with a #15 scalpel blade.  The skin margins were undermined to an appropriate distance in all directions utilizing iris scissors.
Transposition Flap Text: The defect edges were debeveled with a #15 scalpel blade.  Given the location of the defect and the proximity to free margins a transposition flap was deemed most appropriate.  Using a sterile surgical marker, an appropriate transposition flap was drawn incorporating the defect.    The area thus outlined was incised deep to adipose tissue with a #15 scalpel blade.  The skin margins were undermined to an appropriate distance in all directions utilizing iris scissors.
Muscle Hinge Flap Text: The defect edges were debeveled with a #15 scalpel blade.  Given the size, depth and location of the defect and the proximity to free margins a muscle hinge flap was deemed most appropriate.  Using a sterile surgical marker, an appropriate hinge flap was drawn incorporating the defect. The area thus outlined was incised with a #15 scalpel blade.  The skin margins were undermined to an appropriate distance in all directions utilizing iris scissors.
Mustarde Flap Text: The defect edges were debeveled with a #15 scalpel blade.  Given the size, depth and location of the defect and the proximity to free margins a Mustarde flap was deemed most appropriate. Using a sterile surgical marker, an appropriate flap was drawn incorporating the defect. The area thus outlined was incised with a #15 scalpel blade. The skin margins were undermined to an appropriate distance in all directions utilizing iris scissors. Following this, the designed flap was carried into the primary defect and sutured into place.
Nasal Turnover Hinge Flap Text: The defect edges were debeveled with a #15 scalpel blade.  Given the size, depth, location of the defect and the defect being full thickness a nasal turnover hinge flap was deemed most appropriate.  Using a sterile surgical marker, an appropriate hinge flap was drawn incorporating the defect. The area thus outlined was incised with a #15 scalpel blade. The flap was designed to recreate the nasal mucosal lining and the alar rim. The skin margins were undermined to an appropriate distance in all directions utilizing iris scissors.
Nasalis-Muscle-Based Myocutaneous Island Pedicle Flap Text: Using a #15 blade, an incision was made around the donor flap to the level of the nasalis muscle. Wide lateral undermining was then performed in both the subcutaneous plane above the nasalis muscle, and in a submuscular plane just above periosteum. This allowed the formation of a free nasalis muscle axial pedicle (based on the angular artery) which was still attached to the actual cutaneous flap, increasing its mobility and vascular viability. Hemostasis was obtained with pinpoint electrocoagulation. The flap was mobilized into position and the pivotal anchor points positioned and stabilized with buried interrupted sutures. Subcutaneous and dermal tissues were closed in a multilayered fashion with sutures. Tissue redundancies were excised, and the epidermal edges were apposed without significant tension and sutured with sutures.
Orbicularis Oris Muscle Flap Text: The defect edges were debeveled with a #15 scalpel blade.  Given that the defect affected the competency of the oral sphincter an obicularis oris muscle flap was deemed most appropriate to restore this competency and normal muscle function.  Using a sterile surgical marker, an appropriate flap was drawn incorporating the defect. The area thus outlined was incised with a #15 scalpel blade.
Melolabial Transposition Flap Text: The defect edges were debeveled with a #15 scalpel blade.  Given the location of the defect and the proximity to free margins a melolabial flap was deemed most appropriate.  Using a sterile surgical marker, an appropriate melolabial transposition flap was drawn incorporating the defect.    The area thus outlined was incised deep to adipose tissue with a #15 scalpel blade.  The skin margins were undermined to an appropriate distance in all directions utilizing iris scissors.
Rhombic Flap Text: The defect edges were debeveled with a #15 scalpel blade.  Given the location of the defect and the proximity to free margins a rhombic flap was deemed most appropriate.  Using a sterile surgical marker, an appropriate rhombic flap was drawn incorporating the defect.    The area thus outlined was incised deep to adipose tissue with a #15 scalpel blade.  The skin margins were undermined to an appropriate distance in all directions utilizing iris scissors.
Rhomboid Transposition Flap Text: The defect edges were debeveled with a #15 scalpel blade.  Given the location of the defect and the proximity to free margins a rhomboid transposition flap was deemed most appropriate.  Using a sterile surgical marker, an appropriate rhomboid flap was drawn incorporating the defect.    The area thus outlined was incised deep to adipose tissue with a #15 scalpel blade.  The skin margins were undermined to an appropriate distance in all directions utilizing iris scissors.
Bi-Rhombic Flap Text: The defect edges were debeveled with a #15 scalpel blade.  Given the location of the defect and the proximity to free margins a bi-rhombic flap was deemed most appropriate.  Using a sterile surgical marker, an appropriate rhombic flap was drawn incorporating the defect. The area thus outlined was incised deep to adipose tissue with a #15 scalpel blade.  The skin margins were undermined to an appropriate distance in all directions utilizing iris scissors.
Helical Rim Advancement Flap Text: The defect edges were debeveled with a #15 blade scalpel.  Given the location of the defect and the proximity to free margins (helical rim) a double helical rim advancement flap was deemed most appropriate.  Using a sterile surgical marker, the appropriate advancement flaps were drawn incorporating the defect and placing the expected incisions between the helical rim and antihelix where possible.  The area thus outlined was incised through and through with a #15 scalpel blade.  With a skin hook and iris scissors, the flaps were gently and sharply undermined and freed up.
Bilateral Helical Rim Advancement Flap Text: The defect edges were debeveled with a #15 blade scalpel.  Given the location of the defect and the proximity to free margins (helical rim) a bilateral helical rim advancement flap was deemed most appropriate.  Using a sterile surgical marker, the appropriate advancement flaps were drawn incorporating the defect and placing the expected incisions between the helical rim and antihelix where possible.  The area thus outlined was incised through and through with a #15 scalpel blade.  With a skin hook and iris scissors, the flaps were gently and sharply undermined and freed up.
Ear Star Wedge Flap Text: The defect edges were debeveled with a #15 blade scalpel.  Given the location of the defect and the proximity to free margins (helical rim) an ear star wedge flap was deemed most appropriate.  Using a sterile surgical marker, the appropriate flap was drawn incorporating the defect and placing the expected incisions between the helical rim and antihelix where possible.  The area thus outlined was incised through and through with a #15 scalpel blade.
Banner Transposition Flap Text: The defect edges were debeveled with a #15 scalpel blade.  Given the location of the defect and the proximity to free margins a Banner transposition flap was deemed most appropriate.  Using a sterile surgical marker, an appropriate flap drawn around the defect. The area thus outlined was incised deep to adipose tissue with a #15 scalpel blade.  The skin margins were undermined to an appropriate distance in all directions utilizing iris scissors.
Bilobed Flap Text: The defect edges were debeveled with a #15 scalpel blade.  Given the location of the defect and the proximity to free margins a bilobe flap was deemed most appropriate.  Using a sterile surgical marker, an appropriate bilobe flap drawn around the defect.    The area thus outlined was incised deep to adipose tissue with a #15 scalpel blade.  The skin margins were undermined to an appropriate distance in all directions utilizing iris scissors.
Bilobed Transposition Flap Text: The defect edges were debeveled with a #15 scalpel blade.  Given the location of the defect and the proximity to free margins a bilobed transposition flap was deemed most appropriate.  Using a sterile surgical marker, an appropriate bilobe flap drawn around the defect.    The area thus outlined was incised deep to adipose tissue with a #15 scalpel blade.  The skin margins were undermined to an appropriate distance in all directions utilizing iris scissors.
Trilobed Flap Text: The defect edges were debeveled with a #15 scalpel blade.  Given the location of the defect and the proximity to free margins a trilobed flap was deemed most appropriate.  Using a sterile surgical marker, an appropriate trilobed flap drawn around the defect.    The area thus outlined was incised deep to adipose tissue with a #15 scalpel blade.  The skin margins were undermined to an appropriate distance in all directions utilizing iris scissors.
Dorsal Nasal Flap Text: The defect edges were debeveled with a #15 scalpel blade.  Given the location of the defect and the proximity to free margins a dorsal nasal flap was deemed most appropriate.  Using a sterile surgical marker, an appropriate dorsal nasal flap was drawn around the defect.    The area thus outlined was incised deep to adipose tissue with a #15 scalpel blade.  The skin margins were undermined to an appropriate distance in all directions utilizing iris scissors.
Island Pedicle Flap Text: The defect edges were debeveled with a #15 scalpel blade.  Given the location of the defect, shape of the defect and the proximity to free margins an island pedicle advancement flap was deemed most appropriate.  Using a sterile surgical marker, an appropriate advancement flap was drawn incorporating the defect, outlining the appropriate donor tissue and placing the expected incisions within the relaxed skin tension lines where possible.    The area thus outlined was incised deep to adipose tissue with a #15 scalpel blade.  The skin margins were undermined to an appropriate distance in all directions around the primary defect and laterally outward around the island pedicle utilizing iris scissors.  There was minimal undermining beneath the pedicle flap.
Island Pedicle Flap With Canthal Suspension Text: The defect edges were debeveled with a #15 scalpel blade.  Given the location of the defect, shape of the defect and the proximity to free margins an island pedicle advancement flap was deemed most appropriate.  Using a sterile surgical marker, an appropriate advancement flap was drawn incorporating the defect, outlining the appropriate donor tissue and placing the expected incisions within the relaxed skin tension lines where possible. The area thus outlined was incised deep to adipose tissue with a #15 scalpel blade.  The skin margins were undermined to an appropriate distance in all directions around the primary defect and laterally outward around the island pedicle utilizing iris scissors.  There was minimal undermining beneath the pedicle flap. A suspension suture was placed in the canthal tendon to prevent tension and prevent ectropion.
Alar Island Pedicle Flap Text: The defect edges were debeveled with a #15 scalpel blade.  Given the location of the defect, shape of the defect and the proximity to the alar rim an island pedicle advancement flap was deemed most appropriate.  Using a sterile surgical marker, an appropriate advancement flap was drawn incorporating the defect, outlining the appropriate donor tissue and placing the expected incisions within the nasal ala running parallel to the alar rim. The area thus outlined was incised with a #15 scalpel blade.  The skin margins were undermined minimally to an appropriate distance in all directions around the primary defect and laterally outward around the island pedicle utilizing iris scissors.  There was minimal undermining beneath the pedicle flap.
Double Island Pedicle Flap Text: The defect edges were debeveled with a #15 scalpel blade.  Given the location of the defect, shape of the defect and the proximity to free margins a double island pedicle advancement flap was deemed most appropriate.  Using a sterile surgical marker, an appropriate advancement flap was drawn incorporating the defect, outlining the appropriate donor tissue and placing the expected incisions within the relaxed skin tension lines where possible.    The area thus outlined was incised deep to adipose tissue with a #15 scalpel blade.  The skin margins were undermined to an appropriate distance in all directions around the primary defect and laterally outward around the island pedicle utilizing iris scissors.  There was minimal undermining beneath the pedicle flap.
Island Pedicle Flap-Requiring Vessel Identification Text: The defect edges were debeveled with a #15 scalpel blade.  Given the location of the defect, shape of the defect and the proximity to free margins an island pedicle advancement flap was deemed most appropriate.  Using a sterile surgical marker, an appropriate advancement flap was drawn, based on the axial vessel mentioned above, incorporating the defect, outlining the appropriate donor tissue and placing the expected incisions within the relaxed skin tension lines where possible.    The area thus outlined was incised deep to adipose tissue with a #15 scalpel blade.  The skin margins were undermined to an appropriate distance in all directions around the primary defect and laterally outward around the island pedicle utilizing iris scissors.  There was minimal undermining beneath the pedicle flap.
Keystone Flap Text: The defect edges were debeveled with a #15 scalpel blade.  Given the location of the defect, shape of the defect a keystone flap was deemed most appropriate.  Using a sterile surgical marker, an appropriate keystone flap was drawn incorporating the defect, outlining the appropriate donor tissue and placing the expected incisions within the relaxed skin tension lines where possible. The area thus outlined was incised deep to adipose tissue with a #15 scalpel blade.  The skin margins were undermined to an appropriate distance in all directions around the primary defect and laterally outward around the flap utilizing iris scissors.
O-T Plasty Text: The defect edges were debeveled with a #15 scalpel blade.  Given the location of the defect, shape of the defect and the proximity to free margins an O-T plasty was deemed most appropriate.  Using a sterile surgical marker, an appropriate O-T plasty was drawn incorporating the defect and placing the expected incisions within the relaxed skin tension lines where possible.    The area thus outlined was incised deep to adipose tissue with a #15 scalpel blade.  The skin margins were undermined to an appropriate distance in all directions utilizing iris scissors.
O-Z Plasty Text: The defect edges were debeveled with a #15 scalpel blade.  Given the location of the defect, shape of the defect and the proximity to free margins an O-Z plasty (double transposition flap) was deemed most appropriate.  Using a sterile surgical marker, the appropriate transposition flaps were drawn incorporating the defect and placing the expected incisions within the relaxed skin tension lines where possible.    The area thus outlined was incised deep to adipose tissue with a #15 scalpel blade.  The skin margins were undermined to an appropriate distance in all directions utilizing iris scissors.  Hemostasis was achieved with electrocautery.  The flaps were then transposed into place, one clockwise and the other counterclockwise, and anchored with interrupted buried subcutaneous sutures.
Double O-Z Plasty Text: The defect edges were debeveled with a #15 scalpel blade.  Given the location of the defect, shape of the defect and the proximity to free margins a Double O-Z plasty (double transposition flap) was deemed most appropriate.  Using a sterile surgical marker, the appropriate transposition flaps were drawn incorporating the defect and placing the expected incisions within the relaxed skin tension lines where possible. The area thus outlined was incised deep to adipose tissue with a #15 scalpel blade.  The skin margins were undermined to an appropriate distance in all directions utilizing iris scissors.  Hemostasis was achieved with electrocautery.  The flaps were then transposed into place, one clockwise and the other counterclockwise, and anchored with interrupted buried subcutaneous sutures.
V-Y Plasty Text: The defect edges were debeveled with a #15 scalpel blade.  Given the location of the defect, shape of the defect and the proximity to free margins an V-Y advancement flap was deemed most appropriate.  Using a sterile surgical marker, an appropriate advancement flap was drawn incorporating the defect and placing the expected incisions within the relaxed skin tension lines where possible.    The area thus outlined was incised deep to adipose tissue with a #15 scalpel blade.  The skin margins were undermined to an appropriate distance in all directions utilizing iris scissors.
H Plasty Text: Given the location of the defect, shape of the defect and the proximity to free margins a H-plasty was deemed most appropriate for repair.  Using a sterile surgical marker, the appropriate advancement arms of the H-plasty were drawn incorporating the defect and placing the expected incisions within the relaxed skin tension lines where possible. The area thus outlined was incised deep to adipose tissue with a #15 scalpel blade. The skin margins were undermined to an appropriate distance in all directions utilizing iris scissors.  The opposing advancement arms were then advanced into place in opposite direction and anchored with interrupted buried subcutaneous sutures.
W Plasty Text: The lesion was extirpated to the level of the fat with a #15 scalpel blade.  Given the location of the defect, shape of the defect and the proximity to free margins a W-plasty was deemed most appropriate for repair.  Using a sterile surgical marker, the appropriate transposition arms of the W-plasty were drawn incorporating the defect and placing the expected incisions within the relaxed skin tension lines where possible.    The area thus outlined was incised deep to adipose tissue with a #15 scalpel blade.  The skin margins were undermined to an appropriate distance in all directions utilizing iris scissors.  The opposing transposition arms were then transposed into place in opposite direction and anchored with interrupted buried subcutaneous sutures.
Z Plasty Text: The lesion was extirpated to the level of the fat with a #15 scalpel blade.  Given the location of the defect, shape of the defect and the proximity to free margins a Z-plasty was deemed most appropriate for repair.  Using a sterile surgical marker, the appropriate transposition arms of the Z-plasty were drawn incorporating the defect and placing the expected incisions within the relaxed skin tension lines where possible.    The area thus outlined was incised deep to adipose tissue with a #15 scalpel blade.  The skin margins were undermined to an appropriate distance in all directions utilizing iris scissors.  The opposing transposition arms were then transposed into place in opposite direction and anchored with interrupted buried subcutaneous sutures.
Double Z Plasty Text: The lesion was extirpated to the level of the fat with a #15 scalpel blade. Given the location of the defect, shape of the defect and the proximity to free margins a double Z-plasty was deemed most appropriate for repair. Using a sterile surgical marker, the appropriate transposition arms of the double Z-plasty were drawn incorporating the defect and placing the expected incisions within the relaxed skin tension lines where possible. The area thus outlined was incised deep to adipose tissue with a #15 scalpel blade. The skin margins were undermined to an appropriate distance in all directions utilizing iris scissors. The opposing transposition arms were then transposed and carried over into place in opposite direction and anchored with interrupted buried subcutaneous sutures.
Zygomaticofacial Flap Text: Given the location of the defect, shape of the defect and the proximity to free margins a zygomaticofacial flap was deemed most appropriate for repair.  Using a sterile surgical marker, the appropriate flap was drawn incorporating the defect and placing the expected incisions within the relaxed skin tension lines where possible. The area thus outlined was incised deep to adipose tissue with a #15 scalpel blade with preservation of a vascular pedicle.  The skin margins were undermined to an appropriate distance in all directions utilizing iris scissors.  The flap was then placed into the defect and anchored with interrupted buried subcutaneous sutures.
Cheek Interpolation Flap Text: A decision was made to reconstruct the defect utilizing an interpolation axial flap and a staged reconstruction.  A telfa template was made of the defect.  This telfa template was then used to outline the Cheek Interpolation flap.  The donor area for the pedicle flap was then injected with anesthesia.  The flap was excised through the skin and subcutaneous tissue down to the layer of the underlying musculature.  The interpolation flap was carefully excised within this deep plane to maintain its blood supply.  The edges of the donor site were undermined.   The donor site was closed in a primary fashion.  The pedicle was then rotated into position and sutured.  Once the tube was sutured into place, adequate blood supply was confirmed with blanching and refill.  The pedicle was then wrapped with xeroform gauze and dressed appropriately with a telfa and gauze bandage to ensure continued blood supply and protect the attached pedicle.
Cheek-To-Nose Interpolation Flap Text: A decision was made to reconstruct the defect utilizing an interpolation axial flap and a staged reconstruction.  A telfa template was made of the defect.  This telfa template was then used to outline the Cheek-To-Nose Interpolation flap.  The donor area for the pedicle flap was then injected with anesthesia.  The flap was excised through the skin and subcutaneous tissue down to the layer of the underlying musculature.  The interpolation flap was carefully excised within this deep plane to maintain its blood supply.  The edges of the donor site were undermined.   The donor site was closed in a primary fashion.  The pedicle was then rotated into position and sutured.  Once the tube was sutured into place, adequate blood supply was confirmed with blanching and refill.  The pedicle was then wrapped with xeroform gauze and dressed appropriately with a telfa and gauze bandage to ensure continued blood supply and protect the attached pedicle.
Interpolation Flap Text: A decision was made to reconstruct the defect utilizing an interpolation axial flap and a staged reconstruction.  A telfa template was made of the defect.  This telfa template was then used to outline the interpolation flap.  The donor area for the pedicle flap was then injected with anesthesia.  The flap was excised through the skin and subcutaneous tissue down to the layer of the underlying musculature.  The interpolation flap was carefully excised within this deep plane to maintain its blood supply.  The edges of the donor site were undermined.   The donor site was closed in a primary fashion.  The pedicle was then rotated into position and sutured.  Once the tube was sutured into place, adequate blood supply was confirmed with blanching and refill.  The pedicle was then wrapped with xeroform gauze and dressed appropriately with a telfa and gauze bandage to ensure continued blood supply and protect the attached pedicle.
Melolabial Interpolation Flap Text: A decision was made to reconstruct the defect utilizing an interpolation axial flap and a staged reconstruction.  A telfa template was made of the defect.  This telfa template was then used to outline the melolabial interpolation flap.  The donor area for the pedicle flap was then injected with anesthesia.  The flap was excised through the skin and subcutaneous tissue down to the layer of the underlying musculature.  The pedicle flap was carefully excised within this deep plane to maintain its blood supply.  The edges of the donor site were undermined.   The donor site was closed in a primary fashion.  The pedicle was then rotated into position and sutured.  Once the tube was sutured into place, adequate blood supply was confirmed with blanching and refill.  The pedicle was then wrapped with xeroform gauze and dressed appropriately with a telfa and gauze bandage to ensure continued blood supply and protect the attached pedicle.
Mastoid Interpolation Flap Text: A decision was made to reconstruct the defect utilizing an interpolation axial flap and a staged reconstruction.  A telfa template was made of the defect.  This telfa template was then used to outline the mastoid interpolation flap.  The donor area for the pedicle flap was then injected with anesthesia.  The flap was excised through the skin and subcutaneous tissue down to the layer of the underlying musculature.  The pedicle flap was carefully excised within this deep plane to maintain its blood supply.  The edges of the donor site were undermined.   The donor site was closed in a primary fashion.  The pedicle was then rotated into position and sutured.  Once the tube was sutured into place, adequate blood supply was confirmed with blanching and refill.  The pedicle was then wrapped with xeroform gauze and dressed appropriately with a telfa and gauze bandage to ensure continued blood supply and protect the attached pedicle.
Posterior Auricular Interpolation Flap Text: A decision was made to reconstruct the defect utilizing an interpolation axial flap and a staged reconstruction.  A telfa template was made of the defect.  This telfa template was then used to outline the posterior auricular interpolation flap.  The donor area for the pedicle flap was then injected with anesthesia.  The flap was excised through the skin and subcutaneous tissue down to the layer of the underlying musculature.  The pedicle flap was carefully excised within this deep plane to maintain its blood supply.  The edges of the donor site were undermined.   The donor site was closed in a primary fashion.  The pedicle was then rotated into position and sutured.  Once the tube was sutured into place, adequate blood supply was confirmed with blanching and refill.  The pedicle was then wrapped with xeroform gauze and dressed appropriately with a telfa and gauze bandage to ensure continued blood supply and protect the attached pedicle.
Paramedian Forehead Flap Text: A decision was made to reconstruct the defect utilizing an interpolation axial flap and a staged reconstruction.  A telfa template was made of the defect.  This telfa template was then used to outline the paramedian forehead pedicle flap.  The donor area for the pedicle flap was then injected with anesthesia.  The flap was excised through the skin and subcutaneous tissue down to the layer of the underlying musculature.  The pedicle flap was carefully excised within this deep plane to maintain its blood supply.  The edges of the donor site were undermined.   The donor site was closed in a primary fashion.  The pedicle was then rotated into position and sutured.  Once the tube was sutured into place, adequate blood supply was confirmed with blanching and refill.  The pedicle was then wrapped with xeroform gauze and dressed appropriately with a telfa and gauze bandage to ensure continued blood supply and protect the attached pedicle.
Abbe Flap (Upper To Lower Lip) Text: The defect of the lower lip was assessed and measured.  Given the location and size of the defect, an Abbe flap was deemed most appropriate. Using a sterile surgical marker, an appropriate Abbe flap was measured and drawn on the upper lip. Local anesthesia was then infiltrated.  A scalpel was then used to incise the upper lip through and through the skin, vermilion, muscle and mucosa, leaving the flap pedicled on the opposite side.  The flap was then rotated and transferred to the lower lip defect.  The flap was then sutured into place with a three layer technique, closing the orbicularis oris muscle layer with subcutaneous buried sutures, followed by a mucosal layer and an epidermal layer.
Abbe Flap (Lower To Upper Lip) Text: The defect of the upper lip was assessed and measured.  Given the location and size of the defect, an Abbe flap was deemed most appropriate. Using a sterile surgical marker, an appropriate Abbe flap was measured and drawn on the lower lip. Local anesthesia was then infiltrated. A scalpel was then used to incise the upper lip through and through the skin, vermilion, muscle and mucosa, leaving the flap pedicled on the opposite side.  The flap was then rotated and transferred to the lower lip defect.  The flap was then sutured into place with a three layer technique, closing the orbicularis oris muscle layer with subcutaneous buried sutures, followed by a mucosal layer and an epidermal layer.
Estlander Flap (Upper To Lower Lip) Text: The defect of the lower lip was assessed and measured.  Given the location and size of the defect, an Estlander flap was deemed most appropriate. Using a sterile surgical marker, an appropriate Estlander flap was measured and drawn on the upper lip. Local anesthesia was then infiltrated. A scalpel was then used to incise the lateral aspect of the flap, through skin, muscle and mucosa, leaving the flap pedicled medially.  The flap was then rotated and positioned to fill the lower lip defect.  The flap was then sutured into place with a three layer technique, closing the orbicularis oris muscle layer with subcutaneous buried sutures, followed by a mucosal layer and an epidermal layer.
Lip Wedge Excision Repair Text: Given the location of the defect and the proximity to free margins a full thickness wedge repair was deemed most appropriate.  Using a sterile surgical marker, the appropriate repair was drawn incorporating the defect and placing the expected incisions perpendicular to the vermilion border.  The vermilion border was also meticulously outlined to ensure appropriate reapproximation during the repair.  The area thus outlined was incised through and through with a #15 scalpel blade.  The muscularis and dermis were reaproximated with deep sutures following hemostasis. Care was taken to realign the vermilion border before proceeding with the superficial closure.  Once the vermilion was realigned the superfical and mucosal closure was finished.
Ftsg Text: The defect edges were debeveled with a #15 scalpel blade.  Given the location of the defect, shape of the defect and the proximity to free margins a full thickness skin graft was deemed most appropriate.  Using a sterile surgical marker, the primary defect shape was transferred to the donor site. The area thus outlined was incised deep to adipose tissue with a #15 scalpel blade.  The harvested graft was then trimmed of adipose tissue until only dermis and epidermis was left.  The skin margins of the secondary defect were undermined to an appropriate distance in all directions utilizing iris scissors.  The secondary defect was closed with interrupted buried subcutaneous sutures.  The skin edges were then re-apposed with running  sutures.  The skin graft was then placed in the primary defect and oriented appropriately.
Split-Thickness Skin Graft Text: The defect edges were debeveled with a #15 scalpel blade.  Given the location of the defect, shape of the defect and the proximity to free margins a split thickness skin graft was deemed most appropriate.  Using a sterile surgical marker, the primary defect shape was transferred to the donor site. The split thickness graft was then harvested.  The skin graft was then placed in the primary defect and oriented appropriately.
Pinch Graft Text: The defect edges were debeveled with a #15 scalpel blade. Given the location of the defect, shape of the defect and the proximity to free margins a pinch graft was deemed most appropriate. Using a sterile surgical marker, the primary defect shape was transferred to the donor site. The area thus outlined was incised deep to adipose tissue with a #15 scalpel blade.  The harvested graft was then trimmed of adipose tissue until only dermis and epidermis was left. The skin margins of the secondary defect were undermined to an appropriate distance in all directions utilizing iris scissors.  The secondary defect was closed with interrupted buried subcutaneous sutures.  The skin edges were then re-apposed with running  sutures.  The skin graft was then placed in the primary defect and oriented appropriately.
Burow's Graft Text: The defect edges were debeveled with a #15 scalpel blade.  Given the location of the defect, shape of the defect, the proximity to free margins and the presence of a standing cone deformity a Burow's skin graft was deemed most appropriate. The standing cone was removed and this tissue was then trimmed to the shape of the primary defect. The adipose tissue was also removed until only dermis and epidermis were left.  The skin margins of the secondary defect were undermined to an appropriate distance in all directions utilizing iris scissors.  The secondary defect was closed with interrupted buried subcutaneous sutures.  The skin edges were then re-apposed with running  sutures.  The skin graft was then placed in the primary defect and oriented appropriately.
Cartilage Graft Text: The defect edges were debeveled with a #15 scalpel blade.  Given the location of the defect, shape of the defect, the fact the defect involved a full thickness cartilage defect a cartilage graft was deemed most appropriate.  An appropriate donor site was identified, cleansed, and anesthetized. The cartilage graft was then harvested and transferred to the recipient site, oriented appropriately and then sutured into place.  The secondary defect was then repaired using a primary closure.
Composite Graft Text: The defect edges were debeveled with a #15 scalpel blade.  Given the location of the defect, shape of the defect, the proximity to free margins and the fact the defect was full thickness a composite graft was deemed most appropriate.  The defect was outline and then transferred to the donor site.  A full thickness graft was then excised from the donor site. The graft was then placed in the primary defect, oriented appropriately and then sutured into place.  The secondary defect was then repaired using a primary closure.
Epidermal Autograft Text: The defect edges were debeveled with a #15 scalpel blade.  Given the location of the defect, shape of the defect and the proximity to free margins an epidermal autograft was deemed most appropriate.  Using a sterile surgical marker, the primary defect shape was transferred to the donor site. The epidermal graft was then harvested.  The skin graft was then placed in the primary defect and oriented appropriately.
Dermal Autograft Text: The defect edges were debeveled with a #15 scalpel blade.  Given the location of the defect, shape of the defect and the proximity to free margins a dermal autograft was deemed most appropriate.  Using a sterile surgical marker, the primary defect shape was transferred to the donor site. The area thus outlined was incised deep to adipose tissue with a #15 scalpel blade.  The harvested graft was then trimmed of adipose and epidermal tissue until only dermis was left.  The skin graft was then placed in the primary defect and oriented appropriately.
Skin Substitute Text: The defect edges were debeveled with a #15 scalpel blade.  Given the location of the defect, shape of the defect and the proximity to free margins a skin substitute graft was deemed most appropriate.  The graft material was trimmed to fit the size of the defect. The graft was then placed in the primary defect and oriented appropriately.
Tissue Cultured Epidermal Autograft Text: The defect edges were debeveled with a #15 scalpel blade.  Given the location of the defect, shape of the defect and the proximity to free margins a tissue cultured epidermal autograft was deemed most appropriate.  The graft was then trimmed to fit the size of the defect.  The graft was then placed in the primary defect and oriented appropriately.
Xenograft Text: The defect edges were debeveled with a #15 scalpel blade.  Given the location of the defect, shape of the defect and the proximity to free margins a xenograft was deemed most appropriate.  The graft was then trimmed to fit the size of the defect.  The graft was then placed in the primary defect and oriented appropriately.
Purse String (Intermediate) Text: Given the location of the defect and the characteristics of the surrounding skin a purse string intermediate closure was deemed most appropriate.  Undermining was performed circumfirentially around the surgical defect.  A purse string suture was then placed and tightened.
Purse String (Simple) Text: Given the location of the defect and the characteristics of the surrounding skin a purse string simple closure was deemed most appropriate.  Undermining was performed circumferentially around the surgical defect.  A purse string suture was then placed and tightened.
Partial Purse String (Intermediate) Text: Given the location of the defect and the characteristics of the surrounding skin an intermediate purse string closure was deemed most appropriate.  Undermining was performed circumferentially around the surgical defect.  A purse string suture was then placed and tightened. Wound tension of the circular defect prevented complete closure of the wound.
Partial Purse String (Simple) Text: Given the location of the defect and the characteristics of the surrounding skin a simple purse string closure was deemed most appropriate.  Undermining was performed circumferentially around the surgical defect.  A purse string suture was then placed and tightened. Wound tension of the circular defect prevented complete closure of the wound.
Complex Repair And Single Advancement Flap Text: The defect edges were debeveled with a #15 scalpel blade.  The primary defect was closed partially with a complex linear closure.  Given the location of the remaining defect, shape of the defect and the proximity to free margins a single advancement flap was deemed most appropriate for complete closure of the defect.  Using a sterile surgical marker, an appropriate advancement flap was drawn incorporating the defect and placing the expected incisions within the relaxed skin tension lines where possible.    The area thus outlined was incised deep to adipose tissue with a #15 scalpel blade.  The skin margins were undermined to an appropriate distance in all directions utilizing iris scissors.
Complex Repair And Double Advancement Flap Text: The defect edges were debeveled with a #15 scalpel blade.  The primary defect was closed partially with a complex linear closure.  Given the location of the remaining defect, shape of the defect and the proximity to free margins a double advancement flap was deemed most appropriate for complete closure of the defect.  Using a sterile surgical marker, an appropriate advancement flap was drawn incorporating the defect and placing the expected incisions within the relaxed skin tension lines where possible.    The area thus outlined was incised deep to adipose tissue with a #15 scalpel blade.  The skin margins were undermined to an appropriate distance in all directions utilizing iris scissors.
Complex Repair And Modified Advancement Flap Text: The defect edges were debeveled with a #15 scalpel blade.  The primary defect was closed partially with a complex linear closure.  Given the location of the remaining defect, shape of the defect and the proximity to free margins a modified advancement flap was deemed most appropriate for complete closure of the defect.  Using a sterile surgical marker, an appropriate advancement flap was drawn incorporating the defect and placing the expected incisions within the relaxed skin tension lines where possible.    The area thus outlined was incised deep to adipose tissue with a #15 scalpel blade.  The skin margins were undermined to an appropriate distance in all directions utilizing iris scissors.
Complex Repair And A-T Advancement Flap Text: The defect edges were debeveled with a #15 scalpel blade.  The primary defect was closed partially with a complex linear closure.  Given the location of the remaining defect, shape of the defect and the proximity to free margins an A-T advancement flap was deemed most appropriate for complete closure of the defect.  Using a sterile surgical marker, an appropriate advancement flap was drawn incorporating the defect and placing the expected incisions within the relaxed skin tension lines where possible.    The area thus outlined was incised deep to adipose tissue with a #15 scalpel blade.  The skin margins were undermined to an appropriate distance in all directions utilizing iris scissors.
Complex Repair And O-T Advancement Flap Text: The defect edges were debeveled with a #15 scalpel blade.  The primary defect was closed partially with a complex linear closure.  Given the location of the remaining defect, shape of the defect and the proximity to free margins an O-T advancement flap was deemed most appropriate for complete closure of the defect.  Using a sterile surgical marker, an appropriate advancement flap was drawn incorporating the defect and placing the expected incisions within the relaxed skin tension lines where possible.    The area thus outlined was incised deep to adipose tissue with a #15 scalpel blade.  The skin margins were undermined to an appropriate distance in all directions utilizing iris scissors.
Complex Repair And O-L Flap Text: The defect edges were debeveled with a #15 scalpel blade.  The primary defect was closed partially with a complex linear closure.  Given the location of the remaining defect, shape of the defect and the proximity to free margins an O-L flap was deemed most appropriate for complete closure of the defect.  Using a sterile surgical marker, an appropriate flap was drawn incorporating the defect and placing the expected incisions within the relaxed skin tension lines where possible.    The area thus outlined was incised deep to adipose tissue with a #15 scalpel blade.  The skin margins were undermined to an appropriate distance in all directions utilizing iris scissors.
Complex Repair And Bilobe Flap Text: The defect edges were debeveled with a #15 scalpel blade.  The primary defect was closed partially with a complex linear closure.  Given the location of the remaining defect, shape of the defect and the proximity to free margins a bilobe flap was deemed most appropriate for complete closure of the defect.  Using a sterile surgical marker, an appropriate advancement flap was drawn incorporating the defect and placing the expected incisions within the relaxed skin tension lines where possible.    The area thus outlined was incised deep to adipose tissue with a #15 scalpel blade.  The skin margins were undermined to an appropriate distance in all directions utilizing iris scissors.
Complex Repair And Melolabial Flap Text: The defect edges were debeveled with a #15 scalpel blade.  The primary defect was closed partially with a complex linear closure.  Given the location of the remaining defect, shape of the defect and the proximity to free margins a melolabial flap was deemed most appropriate for complete closure of the defect.  Using a sterile surgical marker, an appropriate advancement flap was drawn incorporating the defect and placing the expected incisions within the relaxed skin tension lines where possible.    The area thus outlined was incised deep to adipose tissue with a #15 scalpel blade.  The skin margins were undermined to an appropriate distance in all directions utilizing iris scissors.
Complex Repair And Rotation Flap Text: The defect edges were debeveled with a #15 scalpel blade.  The primary defect was closed partially with a complex linear closure.  Given the location of the remaining defect, shape of the defect and the proximity to free margins a rotation flap was deemed most appropriate for complete closure of the defect.  Using a sterile surgical marker, an appropriate advancement flap was drawn incorporating the defect and placing the expected incisions within the relaxed skin tension lines where possible.    The area thus outlined was incised deep to adipose tissue with a #15 scalpel blade.  The skin margins were undermined to an appropriate distance in all directions utilizing iris scissors.
Complex Repair And Rhombic Flap Text: The defect edges were debeveled with a #15 scalpel blade.  The primary defect was closed partially with a complex linear closure.  Given the location of the remaining defect, shape of the defect and the proximity to free margins a rhombic flap was deemed most appropriate for complete closure of the defect.  Using a sterile surgical marker, an appropriate advancement flap was drawn incorporating the defect and placing the expected incisions within the relaxed skin tension lines where possible.    The area thus outlined was incised deep to adipose tissue with a #15 scalpel blade.  The skin margins were undermined to an appropriate distance in all directions utilizing iris scissors.
Complex Repair And Transposition Flap Text: The defect edges were debeveled with a #15 scalpel blade.  The primary defect was closed partially with a complex linear closure.  Given the location of the remaining defect, shape of the defect and the proximity to free margins a transposition flap was deemed most appropriate for complete closure of the defect.  Using a sterile surgical marker, an appropriate advancement flap was drawn incorporating the defect and placing the expected incisions within the relaxed skin tension lines where possible.    The area thus outlined was incised deep to adipose tissue with a #15 scalpel blade.  The skin margins were undermined to an appropriate distance in all directions utilizing iris scissors.
Complex Repair And V-Y Plasty Text: The defect edges were debeveled with a #15 scalpel blade.  The primary defect was closed partially with a complex linear closure.  Given the location of the remaining defect, shape of the defect and the proximity to free margins a V-Y plasty was deemed most appropriate for complete closure of the defect.  Using a sterile surgical marker, an appropriate advancement flap was drawn incorporating the defect and placing the expected incisions within the relaxed skin tension lines where possible.    The area thus outlined was incised deep to adipose tissue with a #15 scalpel blade.  The skin margins were undermined to an appropriate distance in all directions utilizing iris scissors.
Complex Repair And M Plasty Text: The defect edges were debeveled with a #15 scalpel blade.  The primary defect was closed partially with a complex linear closure.  Given the location of the remaining defect, shape of the defect and the proximity to free margins an M plasty was deemed most appropriate for complete closure of the defect.  Using a sterile surgical marker, an appropriate advancement flap was drawn incorporating the defect and placing the expected incisions within the relaxed skin tension lines where possible.    The area thus outlined was incised deep to adipose tissue with a #15 scalpel blade.  The skin margins were undermined to an appropriate distance in all directions utilizing iris scissors.
Complex Repair And Double M Plasty Text: The defect edges were debeveled with a #15 scalpel blade.  The primary defect was closed partially with a complex linear closure.  Given the location of the remaining defect, shape of the defect and the proximity to free margins a double M plasty was deemed most appropriate for complete closure of the defect.  Using a sterile surgical marker, an appropriate advancement flap was drawn incorporating the defect and placing the expected incisions within the relaxed skin tension lines where possible.    The area thus outlined was incised deep to adipose tissue with a #15 scalpel blade.  The skin margins were undermined to an appropriate distance in all directions utilizing iris scissors.
Complex Repair And W Plasty Text: The defect edges were debeveled with a #15 scalpel blade.  The primary defect was closed partially with a complex linear closure.  Given the location of the remaining defect, shape of the defect and the proximity to free margins a W plasty was deemed most appropriate for complete closure of the defect.  Using a sterile surgical marker, an appropriate advancement flap was drawn incorporating the defect and placing the expected incisions within the relaxed skin tension lines where possible.    The area thus outlined was incised deep to adipose tissue with a #15 scalpel blade.  The skin margins were undermined to an appropriate distance in all directions utilizing iris scissors.
Complex Repair And Z Plasty Text: The defect edges were debeveled with a #15 scalpel blade.  The primary defect was closed partially with a complex linear closure.  Given the location of the remaining defect, shape of the defect and the proximity to free margins a Z plasty was deemed most appropriate for complete closure of the defect.  Using a sterile surgical marker, an appropriate advancement flap was drawn incorporating the defect and placing the expected incisions within the relaxed skin tension lines where possible.    The area thus outlined was incised deep to adipose tissue with a #15 scalpel blade.  The skin margins were undermined to an appropriate distance in all directions utilizing iris scissors.
Complex Repair And Dorsal Nasal Flap Text: The defect edges were debeveled with a #15 scalpel blade.  The primary defect was closed partially with a complex linear closure.  Given the location of the remaining defect, shape of the defect and the proximity to free margins a dorsal nasal flap was deemed most appropriate for complete closure of the defect.  Using a sterile surgical marker, an appropriate flap was drawn incorporating the defect and placing the expected incisions within the relaxed skin tension lines where possible.    The area thus outlined was incised deep to adipose tissue with a #15 scalpel blade.  The skin margins were undermined to an appropriate distance in all directions utilizing iris scissors.
Complex Repair And Ftsg Text: The defect edges were debeveled with a #15 scalpel blade.  The primary defect was closed partially with a complex linear closure.  Given the location of the defect, shape of the defect and the proximity to free margins a full thickness skin graft was deemed most appropriate to repair the remaining defect.  The graft was trimmed to fit the size of the remaining defect.  The graft was then placed in the primary defect, oriented appropriately, and sutured into place.
Complex Repair And Burow's Graft Text: The defect edges were debeveled with a #15 scalpel blade.  The primary defect was closed partially with a complex linear closure.  Given the location of the defect, shape of the defect, the proximity to free margins and the presence of a standing cone deformity a Burow's graft was deemed most appropriate to repair the remaining defect.  The graft was trimmed to fit the size of the remaining defect.  The graft was then placed in the primary defect, oriented appropriately, and sutured into place.
Complex Repair And Split-Thickness Skin Graft Text: The defect edges were debeveled with a #15 scalpel blade.  The primary defect was closed partially with a complex linear closure.  Given the location of the defect, shape of the defect and the proximity to free margins a split thickness skin graft was deemed most appropriate to repair the remaining defect.  The graft was trimmed to fit the size of the remaining defect.  The graft was then placed in the primary defect, oriented appropriately, and sutured into place.
Complex Repair And Epidermal Autograft Text: The defect edges were debeveled with a #15 scalpel blade.  The primary defect was closed partially with a complex linear closure.  Given the location of the defect, shape of the defect and the proximity to free margins an epidermal autograft was deemed most appropriate to repair the remaining defect.  The graft was trimmed to fit the size of the remaining defect.  The graft was then placed in the primary defect, oriented appropriately, and sutured into place.
Complex Repair And Dermal Autograft Text: The defect edges were debeveled with a #15 scalpel blade.  The primary defect was closed partially with a complex linear closure.  Given the location of the defect, shape of the defect and the proximity to free margins an dermal autograft was deemed most appropriate to repair the remaining defect.  The graft was trimmed to fit the size of the remaining defect.  The graft was then placed in the primary defect, oriented appropriately, and sutured into place.
Complex Repair And Tissue Cultured Epidermal Autograft Text: The defect edges were debeveled with a #15 scalpel blade.  The primary defect was closed partially with a complex linear closure.  Given the location of the defect, shape of the defect and the proximity to free margins an tissue cultured epidermal autograft was deemed most appropriate to repair the remaining defect.  The graft was trimmed to fit the size of the remaining defect.  The graft was then placed in the primary defect, oriented appropriately, and sutured into place.
Complex Repair And Xenograft Text: The defect edges were debeveled with a #15 scalpel blade.  The primary defect was closed partially with a complex linear closure.  Given the location of the defect, shape of the defect and the proximity to free margins a xenograft was deemed most appropriate to repair the remaining defect.  The graft was trimmed to fit the size of the remaining defect.  The graft was then placed in the primary defect, oriented appropriately, and sutured into place.
Complex Repair And Skin Substitute Graft Text: The defect edges were debeveled with a #15 scalpel blade.  The primary defect was closed partially with a complex linear closure.  Given the location of the remaining defect, shape of the defect and the proximity to free margins a skin substitute graft was deemed most appropriate to repair the remaining defect.  The graft was trimmed to fit the size of the remaining defect.  The graft was then placed in the primary defect, oriented appropriately, and sutured into place.
Path Notes (To The Dermatopathologist): Please check margins.
Consent was obtained from the patient. The risks and benefits to therapy were discussed in detail. Specifically, the risks of infection, scarring, bleeding, prolonged wound healing, incomplete removal, allergy to anesthesia, nerve injury and recurrence were addressed. Prior to the procedure, the treatment site was clearly identified and confirmed by the patient. All components of Universal Protocol/PAUSE Rule completed.
Post-Care Instructions: I reviewed with the patient in detail post-care instructions. Patient is not to engage in any heavy lifting, exercise, or swimming for the next 14 days. Should the patient develop any fevers, chills, bleeding, severe pain patient will contact the office immediately.
Home Suture Removal Text: Patient was provided a home suture removal kit and will remove their sutures at home.  If they have any questions or difficulties they will call the office.
Where Do You Want The Question To Include Opioid Counseling Located?: Case Summary Tab
Information: Selecting Yes will display possible errors in your note based on the variables you have selected. This validation is only offered as a suggestion for you. PLEASE NOTE THAT THE VALIDATION TEXT WILL BE REMOVED WHEN YOU FINALIZE YOUR NOTE. IF YOU WANT TO FAX A PRELIMINARY NOTE YOU WILL NEED TO TOGGLE THIS TO 'NO' IF YOU DO NOT WANT IT IN YOUR FAXED NOTE.

## 2023-10-03 ENCOUNTER — SPEECH THERAPY (OUTPATIENT)
Dept: SPEECH THERAPY | Facility: OTHER | Age: 78
End: 2023-10-03
Payer: COMMERCIAL

## 2023-10-03 DIAGNOSIS — R13.10 SWALLOWING DYSFUNCTION: ICD-10-CM

## 2023-10-03 PROCEDURE — 92526 ORAL FUNCTION THERAPY: CPT | Mod: GN | Performed by: SPEECH-LANGUAGE PATHOLOGIST

## 2023-10-04 ENCOUNTER — SPEECH THERAPY (OUTPATIENT)
Dept: SPEECH THERAPY | Facility: OTHER | Age: 78
End: 2023-10-04
Payer: COMMERCIAL

## 2023-10-04 DIAGNOSIS — G20.A1 HYPOKINETIC PARKINSONIAN DYSPHONIA (HCC): ICD-10-CM

## 2023-10-04 DIAGNOSIS — R49.0 HYPOKINETIC PARKINSONIAN DYSPHONIA (HCC): ICD-10-CM

## 2023-10-04 DIAGNOSIS — G20.A1 PARKINSON'S DISEASE, UNSPECIFIED WHETHER DYSKINESIA PRESENT, UNSPECIFIED WHETHER MANIFESTATIONS FLUCTUATE (HCC): ICD-10-CM

## 2023-10-04 PROCEDURE — 92508 TX SP LANG VOICE COMM GROUP: CPT | Mod: GN,GC | Performed by: SPEECH-LANGUAGE PATHOLOGIST

## 2023-10-04 NOTE — OP THERAPY PROGRESS SUMMARY
Outpatient Speech Therapy  PROGRESS SUMMARY NOTE      [facility] Speech Pathology & Audiology  [address] [facility] [address]  Phone:  [phone]  Fax:  [phone]    Date of Visit: 10/03/2023    Patient: Laci Baugh  YOB: 1945  MRN: [id number]     Referring Provider: Kamille Hilario M.D.   [address] [place] [address]   Referring Diagnosis Parkinson's Disease     Visit #: 17    Progress Report Period: 10/03/2023-11/03/203    Time Calculation      Start time: 14:30 Stop time:  15:36  Time calculations: 66 minutes     Background Information   Laci Baugh, a 78-year-old male, was self-referred to the [facility] for swallowing difficulties. On November 16, 2022, Mr. Baugh was diagnosed with Parkinson's disease and was prescribed Carbidopa and Levodopa for treatment and management. Mr. Baugh was initially instructed to take four pills per day. On April 26, 2023, Mr. Baugh followed up with his provider and his dose was increased to eight pills per day, every two hours. Mr. Baugh believed the increase in the medication dose has resulted in the improvement of swallowing, choking, and drooling. Mr. Baugh denied having current swallowing difficulties, but did indicate at times he has difficulty chewing and swallowing without incident when he is distracted or talking mainly in social situations. He also indicated he has more difficulty with dry chewable foods rather than soft and wet foods.     Chief Complaint: Swallow Evaluation (Patient complains of increased coughing however vast improvement noted with medication change)    Visit Diagnoses     ICD-10-CM   1. Swallowing dysfunction  R13.10       Subjective Evaluation  Mr. Baugh arrived on time to the session today independently. In conversation, Mr. Baugh stated, “I think I went on a different dose of Parkinson's pills, Levodopa Carbidopa. The swallowing problems  improved”. Mr. Baugh self-reported choking on Ahi tuna while at dinner with his wife as a result of texture and lack of chewing. Mr. Baugh reported being given the Heimlich maneuver by his wife at home and described multiple episodes of coughing that occurred while eating. He also stated he has not had a swallowing issue since then and he is not likely to choke on thin liquids (e.g., water), rather than solid dry foods.     Surgical History   10/27/2019 -- [place] Upper GI Endoscopy, Remove Foreign Body       - Performed by Walt García M.D. at [facility]     1/16/2019 -- Ectropian repair (bilateral): lower lid with lateral tarsal strips and midface advancement flaps         - Performed by Ethan Goldstein M.D. at [facility]     1985/2014 Skin Cancer Removal     Speech Therapy Objective  Swallowing intervention    Assessments  Mr. Baugh declined to participate in the intended instrumental evaluation, Fiberoptic Endoscopic Evaluation of Swallowing (FEES), as he stated he did not have swallowing difficulties in a traditional sense. He indicated that the choking and coughing has improved but does have difficulty during meals when he is distracted usually ending in having to spit the food out on his plate. The patient indicated it's embarrassing and his wife is appalled.  He would like to continue eating at restaurants but he is afraid his wife will refuse to go if he continues to have swallowing issues. During the session, Surface Electromyography was conducted to measure the muscle activity of the client’s swallow. Visualizations used were pacing in work rest cycles, bolus hold in random timed events and swallow form focus exercise. Patient had difficulty pacing himself independently and needed maximum visual and verbal cues to slow down. Patient's swallow form was irregular with increased duration between onset and offset of swallow. Patient had difficulty returning the  "visualizations back to baseline after each swallow. Swallow traces were made up of extraneous \"noise.\" The trace biofeedback was too difficult for the patient to understand, therefore the task was changed to skill based learning using the bow and arrow game. Again, the task was not entirely meaningful to the patient. His swallow timing was \"messy\" and he was unable to pace himself and swallow upon visual command (balloons passing). Thus, the task was switched to the kangaroo game and patient was instructed to drink water as the kangaroo bounced along, patient was cued to hold the bolus and swallow and try to catch the coin as the kangaroo passed. The intervals were set at 10 seconds. Patient had difficulty timing the swallow and found it \"easier\" to wait until the target stopped and then swallowed. Patient was encouraged to swallow and keep the kangaroo moving. This was difficult for patient to accomplish. Finally after 20 repetitions, patient started to slow down and pace his swallow events with max visual and verbal cues.  The visualizations provided an opportunity to improve patient's swallow motor plan while reducing cognitive load. Patient needs rigorous motor planning exercise with plans for carry-over into different social environments while eating.    Speech Therapy Plan  LTG1: Patient will improve swallow efficiency for all volumes and consistencies in all environments.     Short Term Goal 1: Patient will improve swallow form using sEMG traces in work rest cycles by producing a swallow of  2-4 seconds in duration with swallow attributes visualized as onset that starts at baseline (approx. 4 microvolts) a short duration with one peak (2-4 seconds), and an offset that ends at baseline (same microvolts that swallow initiated) with minimum clinician support 25-60 times per session across 5 consecutive sessions.     Short Term Goal 2: Patient will pace his swallow using work rest cycles. Patient will swallow 10 " "ml boluses of thin in work zones and bolus hold in rest zones. Rest zones should start at 10 second intervals, reduce rest zone time during intervals as patient becomes independent with the task. Pace should emulate an appropriate pace for eating dinner in a social environment with distractions \"adjusted pace\".     Short Term Goal 3: Patient will use adjusted pace with a progression of bolus (start with 10 ml bolus of thin, progressing by 5 to 15 ml.) difficulty with increased volume then complexity of consistencies in work rest cycles.     Short Term Goal 4: Patient will use adjusted pace with a progression of bolus difficulty with increased volume then complexity of consistencies in ben visualizations.     Short Term Goal 5: Patient will tolerate increased cognitive tasks with distractions while eating using his adjusted pace with various volumes and consistencies independently in all settings.       Procedures: The clinician provided the patient with water during swallowing procedure. The patient produced a typical swallow and followed it with an effortful swallow by pushing his tongue against his hard palate. The clinician instructed the patient that the effortful swallow should be double the strength of the typical swallow.        Referring provider co-signature:  I have reviewed this plan of care and my co-signature certifies the need for services.    Certification Period: 10/03/2023 to 01/02/24    Physician Signature: ________________________________ Date: ______________          "

## 2023-10-04 NOTE — OP THERAPY DAILY TREATMENT
"  Outpatient Speech Therapy  DAILY TREATMENT     [facility] Speech Pathology & Audiology  [address] [place] [address]  Phone:  [phone]  Fax:  [phone]    Date: 10/04/2023    Patient: Laci Baugh  YOB: 1945  MRN: [id number]     Time Calculation    Start time: 1000  Stop time: 1055 Time Calculation (min): 55 minutes         Chief Complaint: Speech Therapy    Visit #: 18    Subjective Evaluation  Mr. Baugh arrived on time and independently. Mr. Baugh required minimal redirections throughout the session.    Speech Therapy Objective   1. Cues from conversation partners  Mr. Baugh participated in a 55 minute conversation with the other group member and clinicians. He required two verbal cues in conversation to increase loudness from his conversation partner.      2. dB readings in conversation  Several dB readings were taken to determine Mr. Baugh's average conversational volume. Mr. Baugh achieved the following:    Trial 1 63 dB   Trial 2 62 dB   Trial 3 57 dB   Trial 4 71 dB   Trial 5 59 dB   Trial 6 65 dB   Trial 7 70 dB   Trial 8 51 dB   Average 63 dB     Mr. Diazs loudness decreased by 5 dB since last session. He benefited from verbal cues which should be increased in the next session.     3. Redirections for pragmatic behavior  Mr. Baugh required cues from the clinician for appropriate pragmatic behavior (turn-taking) due to talking over other group members. This will be formally addressed in future sessions.      Assessments  Mr. Baugh presents with Parkinson's disease and a diagnosis of mild hypokinetic dysarthria characterized by weak breathy voice, low volume, and variable rate of speech.      Mr. Baugh participated in a group session to generalize strategies learned in previous one-on-one therapy and to provide natural conversational cues. He was receptive to visual and verbal cues (e.g. \"What?\") from his conversational partners.     Speech Therapy " Plan  Mr. Baugh will return to speech therapy on 10/11/2023 for the Parkinson's group.     Visit 18 out of 20  [x] As the licensed therapist supervising this student, I was present during the entire treatment session directing the care and reviewing the assessment plan.  I reviewed all documentation prior to signing.

## 2023-10-09 ENCOUNTER — SPEECH THERAPY (OUTPATIENT)
Dept: SPEECH THERAPY | Facility: OTHER | Age: 78
End: 2023-10-09
Payer: COMMERCIAL

## 2023-10-09 DIAGNOSIS — G20.A1 PARKINSON'S DISEASE, UNSPECIFIED WHETHER DYSKINESIA PRESENT, UNSPECIFIED WHETHER MANIFESTATIONS FLUCTUATE (HCC): ICD-10-CM

## 2023-10-09 PROCEDURE — 92507 TX SP LANG VOICE COMM INDIV: CPT | Mod: GN | Performed by: SPEECH-LANGUAGE PATHOLOGIST

## 2023-10-09 PROCEDURE — 96125 COGNITIVE TEST BY HC PRO: CPT | Mod: GN | Performed by: SPEECH-LANGUAGE PATHOLOGIST
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
## 2023-10-19 ENCOUNTER — APPOINTMENT (RX ONLY)
Dept: URBAN - METROPOLITAN AREA CLINIC 6 | Facility: CLINIC | Age: 78
Setting detail: DERMATOLOGY
End: 2023-10-19

## 2023-10-19 DIAGNOSIS — Z48.02 ENCOUNTER FOR REMOVAL OF SUTURES: ICD-10-CM

## 2023-10-19 PROCEDURE — ? ORDER TESTS

## 2023-10-19 PROCEDURE — ? DRESSING CHANGE

## 2023-10-19 PROCEDURE — ? PRESCRIPTION

## 2023-10-19 PROCEDURE — ? SUTURE REMOVAL (GLOBAL PERIOD)

## 2023-10-19 RX ORDER — DOXYCYCLINE HYCLATE 100 MG/1
1 CAPSULE, GELATIN COATED ORAL BID
Qty: 28 | Refills: 0 | Status: ERX | COMMUNITY
Start: 2023-10-19

## 2023-10-19 RX ADMIN — DOXYCYCLINE HYCLATE 1: 100 CAPSULE, GELATIN COATED ORAL at 00:00

## 2023-10-19 ASSESSMENT — LOCATION ZONE DERM: LOCATION ZONE: TRUNK

## 2023-10-19 ASSESSMENT — LOCATION DETAILED DESCRIPTION DERM: LOCATION DETAILED: LEFT MEDIAL SUPERIOR CHEST

## 2023-10-19 ASSESSMENT — LOCATION SIMPLE DESCRIPTION DERM: LOCATION SIMPLE: CHEST

## 2023-10-19 NOTE — PROCEDURE: SUTURE REMOVAL (GLOBAL PERIOD)
Detail Level: Detailed
Add 43492 Cpt? (Important Note: In 2017 The Use Of 80023 Is Being Tracked By Cms To Determine Future Global Period Reimbursement For Global Periods): no

## 2023-10-19 NOTE — PROCEDURE: DRESSING CHANGE
Detail Level: Detailed
Wound Evaluated By: Dr. Simmons
Add 24139 Cpt? (Important Note: In 2017 The Use Of 00665 Is Being Tracked By Cms To Determine Future Global Period Reimbursement For Global Periods): no

## 2023-10-19 NOTE — PROCEDURE: ORDER TESTS
Performing Laboratory: 0
Billing Type: Third-Party Bill
Expected Date Of Service: 10/19/2023
Bill For Surgical Tray: no

## 2023-10-23 ENCOUNTER — APPOINTMENT (RX ONLY)
Dept: URBAN - METROPOLITAN AREA CLINIC 6 | Facility: CLINIC | Age: 78
Setting detail: DERMATOLOGY
End: 2023-10-23

## 2023-10-23 DIAGNOSIS — Z48.817 ENCOUNTER FOR SURGICAL AFTERCARE FOLLOWING SURGERY ON THE SKIN AND SUBCUTANEOUS TISSUE: ICD-10-CM

## 2023-10-23 PROCEDURE — ? COUNSELING

## 2023-10-23 PROCEDURE — 99212 OFFICE O/P EST SF 10 MIN: CPT | Mod: 24

## 2023-10-23 PROCEDURE — ? DRESSING CHANGE

## 2023-10-23 ASSESSMENT — LOCATION DETAILED DESCRIPTION DERM: LOCATION DETAILED: LEFT MEDIAL SUPERIOR CHEST

## 2023-10-23 ASSESSMENT — LOCATION SIMPLE DESCRIPTION DERM: LOCATION SIMPLE: CHEST

## 2023-10-23 ASSESSMENT — LOCATION ZONE DERM: LOCATION ZONE: TRUNK

## 2023-10-23 NOTE — PROCEDURE: DRESSING CHANGE
Detail Level: Detailed
Wound Evaluated By: Dr. Simmons
Add 53987 Cpt? (Important Note: In 2017 The Use Of 29058 Is Being Tracked By Cms To Determine Future Global Period Reimbursement For Global Periods): no

## 2023-10-27 NOTE — OP THERAPY DISCHARGE SUMMARY
"  Outpatient Speech Therapy  DISCHARGE SUMMARY NOTE      [facility] Speech Pathology & Audiology  [address] [facility] [address]  Phone:  [phone]  Fax:  [phone]    Date of Visit: 10/09/2023    Patient: Laci Baugh  YOB: 1945  MRN: [id number]     Referring Provider: No referring provider defined for this encounter.   Referring Diagnosis: No admission diagnoses are documented for this encounter.         Chief Complaint: Speech Therapy    Visit Diagnoses     ICD-10-CM   1. Parkinson's disease, unspecified whether dyskinesia present, unspecified whether manifestations fluctuate  G20.A1     This is a 79 y/o male who was seen for a total of 19 skilled ST visits addressing his hypokinetic dysarthria and mild-moderate oropharyngeal dysphagia. He demonstrated progression across his intervention. Please see the below information regarding his progress toward his LTG and STG's.     LTG 1: Client will improve his intelligibility, comprehensibility, and naturalness in conversational tasks in all speaking environments     STG. 1. Client will produce spontaneous conversation at 75 dB SPL in the clinical setting without clinician cues across three consecutive sessions     STG 2. Client will warm up voice at 85-90 dB SPL in the clinical setting without cues across three consecutive sessions     STG 3. Client will sustain \"ah\" for 15 seconds in the clinical setting without modulating and without cues across three consecutive sessions    STG 4. Client will produce vocal glides at 75-85 dB SPL in the clinical setting without cues across three consecutive sessions     STG 5. Client will produce vocal glides with good clear vocal quality 15/15 trials in the clinical setting without cues across three consecutive sessions     STG 6. Client will recite numerical sequences at 85 dB SPL in the clinical setting without cues across three consecutive sessions     STG 7. Client will complete simple " "then complex cognitive-linguistic tasks at 75 dB SPL spontaneously and without cues in the clinical setting across three consecutive sessions     STG 8. Client will complete daily carry-over home practice tasks 5 out of 7 days throughout the entire program without clinician cues     STG 9. Client will warm up voice with good intent by using a Likert scale (0-no intent, 10-intent), rating himself within +/- 1 of the clinician's score across three consecutive sessions     STG 10. Client will sustain \"ah\" with good intent by using a Likert scale (0-no intent, 10-intent), rating himself within +/- 1 of the clinician's score across three consecutive sessions      STG 11. Client will produce vocal glides with good intent by using a Likert scale (0-no intent, 10-intent), rating himself within +/- 1 of the clinician's score across three consecutive sessions     STG 12. Client will recite numerical sequences with good intent by using a Likert scale (0-no intent, 10-intent), rating himself within +/- 1 of the clinician's score across three consecutive sessions     STG 13. Client will read phrases with good intent by using a Likert scale (0-no intent, 10-intent), rating himself within +/- 1 of the clinician's score across three consecutive sessions     STG 14. Client will complete simple then complex cognitive-linguistic tasks with good intent by using a Likert scale (0-no intent, 10-intent), rating himself within +/- 1 of the clinician's score across three consecutive sessions     STG 15. Client's spontaneous speech with be comprehensible to unfamiliar listeners rated by using a Likert scale (0-incomprehensible, 10-comprehensible), rating himself within +/- 1 of the clinician's score across three consecutive sessions     STG 16. Client will produce intentful speech (85-90dbSPL) when stating functional phrases given to him throughout the session with no clinician support 5/5 time across three consecutive sessions.      STG 17. " The client will use facial expressions that properly express his emotions with minimal clinician cueing on 8/10 attempts over five sessions.

## 2023-11-01 ENCOUNTER — APPOINTMENT (RX ONLY)
Dept: URBAN - METROPOLITAN AREA CLINIC 36 | Facility: CLINIC | Age: 78
Setting detail: DERMATOLOGY
End: 2023-11-01

## 2023-11-01 PROBLEM — C44.529 SQUAMOUS CELL CARCINOMA OF SKIN OF OTHER PART OF TRUNK: Status: ACTIVE | Noted: 2023-11-01

## 2023-11-01 PROCEDURE — 17313 MOHS 1 STAGE T/A/L: CPT | Mod: 79

## 2023-11-01 PROCEDURE — 17314 MOHS ADDL STAGE T/A/L: CPT | Mod: 79

## 2023-11-01 PROCEDURE — ? MOHS SURGERY

## 2023-11-01 PROCEDURE — ? PRESCRIPTION

## 2023-11-01 PROCEDURE — 13102 CMPLX RPR TRUNK ADDL 5CM/<: CPT | Mod: 79

## 2023-11-01 PROCEDURE — 13101 CMPLX RPR TRUNK 2.6-7.5 CM: CPT | Mod: 79

## 2023-11-01 RX ORDER — DOXYCYCLINE 100 MG/1
CAPSULE ORAL
Qty: 20 | Refills: 0 | Status: ERX | COMMUNITY
Start: 2023-11-01

## 2023-11-01 RX ADMIN — DOXYCYCLINE: 100 CAPSULE ORAL at 00:00

## 2023-11-01 NOTE — PROCEDURE: MOHS SURGERY
Trilobed Flap Text: The defect edges were debeveled with a #15 scalpel blade.  Given the location of the defect and the proximity to free margins a trilobed flap was deemed most appropriate.  Using a sterile surgical marker, an appropriate trilobed flap drawn around the defect.    The area thus outlined was incised deep to adipose tissue with a #15 scalpel blade.  The skin margins were undermined to an appropriate distance in all directions utilizing iris scissors.
Asc Procedure Text (C): After obtaining clear surgical margins the patient was sent to an ASC for surgical repair.  The patient understands they will receive post-surgical care and follow-up from the ASC physician.
Consent (Marginal Mandibular)/Introductory Paragraph: The rationale for Mohs was explained to the patient and consent was obtained. The risks, benefits and alternatives to therapy were discussed in detail. Specifically, the risks of damage to the marginal mandibular branch of the facial nerve, infection, scarring, bleeding, prolonged wound healing, incomplete removal, allergy to anesthesia, and recurrence were addressed. Prior to the procedure, the treatment site was clearly identified and confirmed by the patient. All components of Universal Protocol/PAUSE Rule completed.
Number Of Stages: 2
Graft Donor Site Epidermal Sutures (Optional): 5-0 Surgipro
Include Mohsaiq Anticoagulation Items In The Validation Algorithm?: No
Skin Substitute Text: The defect edges were debeveled with a #15 scalpel blade.  Given the location of the defect, shape of the defect and the proximity to free margins a skin substitute graft was deemed most appropriate.  The graft material was trimmed to fit the size of the defect. The graft was then placed in the primary defect and oriented appropriately.
Special Stains Stage 4 - Results: Base On Clearance Noted Above
Brow Lift Text: A midfrontal incision was made medially to the defect to allow access to the tissues just superior to the left eyebrow. Following careful dissection inferiorly in a supraperiosteal plane to the level of the left eyebrow, several 3-0 monocryl sutures were used to resuspend the eyebrow orbicularis oculi muscular unit to the superior frontal bone periosteum. This resulted in an appropriate reapproximation of static eyebrow symmetry and correction of the left brow ptosis.
Was The Patient On Physician Recommended Anticoagulation Therapy?: Please Select the Appropriate Response
Show Mohs Rapid Report Variable In The Stage Tabs (Ensure You Have This How You Like Before You Hide): Yes
Plastic Surgeon Procedure Text (A): After obtaining clear surgical margins the patient was sent to plastics for surgical repair.  The patient understands they will receive post-surgical care and follow-up from the referring physician's office.
Mohs Histo Method Verbiage: Each section was then chromacoded and processed in the Mohs lab using the Mohs protocol and submitted for frozen section.
Debridement Text: The wound edges were debrided prior to proceeding with the closure to facilitate wound healing.
Repair Performed By Another Provider Text (Leave Blank If You Do Not Want): After obtaining clear surgical margins the defect was repaired by another provider.
Stage 15: Additional Anesthesia Volume In Cc: 0
Date Of Previous Biopsy (Optional): 10/2/23
Muscle Hinge Flap Text: The defect edges were debeveled with a #15 scalpel blade.  Given the size, depth and location of the defect and the proximity to free margins a muscle hinge flap was deemed most appropriate.  Using a sterile surgical marker, an appropriate hinge flap was drawn incorporating the defect. The area thus outlined was incised with a #15 scalpel blade.  The skin margins were undermined to an appropriate distance in all directions utilizing iris scissors.
Cheek Interpolation Flap Text: A decision was made to reconstruct the defect utilizing an interpolation axial flap and a staged reconstruction.  A telfa template was made of the defect.  This telfa template was then used to outline the Cheek Interpolation flap.  The donor area for the pedicle flap was then injected with anesthesia.  The flap was excised through the skin and subcutaneous tissue down to the layer of the underlying musculature.  The interpolation flap was carefully excised within this deep plane to maintain its blood supply.  The edges of the donor site were undermined.   The donor site was closed in a primary fashion.  The pedicle was then rotated into position and sutured.  Once the tube was sutured into place, adequate blood supply was confirmed with blanching and refill.  The pedicle was then wrapped with xeroform gauze and dressed appropriately with a telfa and gauze bandage to ensure continued blood supply and protect the attached pedicle.
Donor Site Anesthesia Type: same as repair anesthesia
V-Y Plasty Text: The defect edges were debeveled with a #15 scalpel blade.  Given the location of the defect, shape of the defect and the proximity to free margins an V-Y advancement flap was deemed most appropriate.  Using a sterile surgical marker, an appropriate advancement flap was drawn incorporating the defect and placing the expected incisions within the relaxed skin tension lines where possible.    The area thus outlined was incised deep to adipose tissue with a #15 scalpel blade.  The skin margins were undermined to an appropriate distance in all directions utilizing iris scissors.
Intermediate Repair Preamble Text (Leave Blank If You Do Not Want): Undermining was performed with blunt dissection.
Oculoplastic Surgeon Procedure Text (A): After obtaining clear surgical margins the patient was sent to oculoplastics for surgical repair.  The patient understands they will receive post-surgical care and follow-up from the referring physician's office.
Modified Advancement Flap Text: The defect edges were debeveled with a #15 scalpel blade.  Given the location of the defect, shape of the defect and the proximity to free margins a modified advancement flap was deemed most appropriate.  Using a sterile surgical marker, an appropriate advancement flap was drawn incorporating the defect and placing the expected incisions within the relaxed skin tension lines where possible.    The area thus outlined was incised deep to adipose tissue with a #15 scalpel blade.  The skin margins were undermined to an appropriate distance in all directions utilizing iris scissors.
Pain Refusal Text: I offered to prescribe pain medication but the patient refused to take this medication.
Abbe Flap (Lower To Upper Lip) Text: The defect of the upper lip was assessed and measured.  Given the location and size of the defect, an Abbe flap was deemed most appropriate.  Using a sterile surgical marker, an appropriate Abbe flap was measured and drawn on the lower lip. Local anesthesia was then infiltrated. A scalpel was then used to incise the upper lip through and through the skin, vermilion, muscle and mucosa, leaving the flap pedicled on the opposite side.  The flap was then rotated and transferred to the lower lip defect.  The flap was then sutured into place with a three layer technique, closing the orbicularis oris muscle layer with subcutaneous buried sutures, followed by a mucosal layer and an epidermal layer.
Referred To Mid-Level For Closure Text (Leave Blank If You Do Not Want): After obtaining clear surgical margins the patient was sent to a mid-level provider for surgical repair.  The patient understands they will receive post-surgical care and follow-up from the mid-level provider.
Repair Hemostasis (Optional): Pinpoint electrocautery
Mohs Method Verbiage: An incision at a 45 degree angle following the standard Mohs approach was done and the specimen was harvested as a microscopic controlled layer.
Medical Necessity Statement: Based on my medical judgement, Mohs surgery is the most appropriate treatment for this cancer compared to other treatments.
Surgeon: Citlaly Arredondo MD
Consent Type: Consent 1 (Standard)
Nostril Rim Text: The closure involved the nostril rim.
Melolabial Interpolation Flap Text: A decision was made to reconstruct the defect utilizing an interpolation axial flap and a staged reconstruction.  A telfa template was made of the defect.  This telfa template was then used to outline the melolabial interpolation flap.  The donor area for the pedicle flap was then injected with anesthesia.  The flap was excised through the skin and subcutaneous tissue down to the layer of the underlying musculature.  The pedicle flap was carefully excised within this deep plane to maintain its blood supply.  The edges of the donor site were undermined.   The donor site was closed in a primary fashion.  The pedicle was then rotated into position and sutured.  Once the tube was sutured into place, adequate blood supply was confirmed with blanching and refill.  The pedicle was then wrapped with xeroform gauze and dressed appropriately with a telfa and gauze bandage to ensure continued blood supply and protect the attached pedicle.
Suture Removal: 14 days
Stage 1: Number Of Blocks?: 1
Interpolation Flap Text: A decision was made to reconstruct the defect utilizing an interpolation axial flap and a staged reconstruction.  A telfa template was made of the defect.  This telfa template was then used to outline the interpolation flap.  The donor area for the pedicle flap was then injected with anesthesia.  The flap was excised through the skin and subcutaneous tissue down to the layer of the underlying musculature.  The interpolation flap was carefully excised within this deep plane to maintain its blood supply.  The edges of the donor site were undermined.   The donor site was closed in a primary fashion.  The pedicle was then rotated into position and sutured.  Once the tube was sutured into place, adequate blood supply was confirmed with blanching and refill.  The pedicle was then wrapped with xeroform gauze and dressed appropriately with a telfa and gauze bandage to ensure continued blood supply and protect the attached pedicle.
M-Plasty Complex Repair Preamble Text (Leave Blank If You Do Not Want): Extensive wide undermining was performed.
Consent (Nose)/Introductory Paragraph: The rationale for Mohs was explained to the patient and consent was obtained. The risks, benefits and alternatives to therapy were discussed in detail. Specifically, the risks of nasal deformity, changes in the flow of air through the nose, infection, scarring, bleeding, prolonged wound healing, incomplete removal, allergy to anesthesia, nerve injury and recurrence were addressed. Prior to the procedure, the treatment site was clearly identified and confirmed by the patient. All components of Universal Protocol/PAUSE Rule completed.
Otolaryngologist Procedure Text (B): After obtaining clear surgical margins the patient was sent to otolaryngology for surgical repair.  The patient understands they will receive post-surgical care and follow-up from the referring physician's office.
Stage 12: Additional Anesthesia Type: 1% lidocaine with epinephrine
Ftsg Text: The defect edges were debeveled with a #15 scalpel blade.  Given the location of the defect, shape of the defect and the proximity to free margins a full thickness skin graft was deemed most appropriate.  Using a sterile surgical marker, the primary defect shape was transferred to the donor site. The area thus outlined was incised deep to adipose tissue with a #15 scalpel blade.  The harvested graft was then trimmed of adipose tissue until only dermis and epidermis was left.  The skin margins of the secondary defect were undermined to an appropriate distance in all directions utilizing iris scissors.  The secondary defect was closed with interrupted buried subcutaneous sutures.  The skin edges were then re-apposed with running  sutures.  The skin graft was then placed in the primary defect and oriented appropriately.
Ear Star Wedge Flap Text: The defect edges were debeveled with a #15 blade scalpel.  Given the location of the defect and the proximity to free margins (helical rim) an ear star wedge flap was deemed most appropriate.  Using a sterile surgical marker, the appropriate flap was drawn incorporating the defect and placing the expected incisions between the helical rim and antihelix where possible.  The area thus outlined was incised through and through with a #15 scalpel blade.
Bilobed Flap Text: The defect edges were debeveled with a #15 scalpel blade.  Given the location of the defect and the proximity to free margins a bilobe flap was deemed most appropriate.  Using a sterile surgical marker, an appropriate bilobe flap drawn around the defect.    The area thus outlined was incised deep to adipose tissue with a #15 scalpel blade.  The skin margins were undermined to an appropriate distance in all directions utilizing iris scissors.
Nasal Turnover Hinge Flap Text: The defect edges were debeveled with a #15 scalpel blade.  Given the size, depth, location of the defect and the defect being full thickness a nasal turnover hinge flap was deemed most appropriate.  Using a sterile surgical marker, an appropriate hinge flap was drawn incorporating the defect. The area thus outlined was incised with a #15 scalpel blade. The flap was designed to recreate the nasal mucosal lining and the alar rim. The skin margins were undermined to an appropriate distance in all directions utilizing iris scissors.
Banner Transposition Flap Text: The defect edges were debeveled with a #15 scalpel blade.  Given the location of the defect and the proximity to free margins a Banner transposition flap was deemed most appropriate.  Using a sterile surgical marker, an appropriate flap drawn around the defect. The area thus outlined was incised deep to adipose tissue with a #15 scalpel blade.  The skin margins were undermined to an appropriate distance in all directions utilizing iris scissors.
Double O-Z Plasty Text: The defect edges were debeveled with a #15 scalpel blade.  Given the location of the defect, shape of the defect and the proximity to free margins a Double O-Z plasty (double transposition flap) was deemed most appropriate.  Using a sterile surgical marker, the appropriate transposition flaps were drawn incorporating the defect and placing the expected incisions within the relaxed skin tension lines where possible. The area thus outlined was incised deep to adipose tissue with a #15 scalpel blade.  The skin margins were undermined to an appropriate distance in all directions utilizing iris scissors.  Hemostasis was achieved with electrocautery.  The flaps were then transposed into place, one clockwise and the other counterclockwise, and anchored with interrupted buried subcutaneous sutures.
Wound Care: Vaseline
Bcc Histology Text: There were numerous aggregates of basaloid cells.
Partial Purse String (Simple) Text: Given the location of the defect and the characteristics of the surrounding skin a simple purse string closure was deemed most appropriate.  Undermining was performed circumfirentially around the surgical defect.  A purse string suture was then placed and tightened. Wound tension only allowed a partial closure of the circular defect.
Estlander Flap (Lower To Upper Lip) Text: The defect of the lower lip was assessed and measured.  Given the location and size of the defect, an Estlander flap was deemed most appropriate.  Using a sterile surgical marker, an appropriate Estlander flap was measured and drawn on the upper lip. Local anesthesia was then infiltrated. A scalpel was then used to incise the lateral aspect of the flap, through skin, muscle and mucosa, leaving the flap pedicled medially.  The flap was then rotated and positioned to fill the lower lip defect.  The flap was then sutured into place with a three layer technique, closing the orbicularis oris muscle layer with subcutaneous buried sutures, followed by a mucosal layer and an epidermal layer.
Cheiloplasty (Complex) Text: A decision was made to reconstruct the defect with a  cheiloplasty.  The defect was undermined extensively.  Additional obicularis oris muscle was excised with a 15 blade scalpel.  The defect was converted into a full thickness wedge to facilite a better cosmetic result.  Small vessels were then tied off with 5-0 monocyrl. The obicularis oris, superficial fascia, adipose and dermis were then reapproximated.  After the deeper layers were approximated the epidermis was reapproximated with particular care given to realign the vermilion border.
Primary Defect Length In Cm (Final Defect Size - Required For Flaps/Grafts): 6.6
Postop Diagnosis: same
Zygomaticofacial Flap Text: Given the location of the defect, shape of the defect and the proximity to free margins a zygomaticofacial flap was deemed most appropriate for repair.  Using a sterile surgical marker, the appropriate flap was drawn incorporating the defect and placing the expected incisions within the relaxed skin tension lines where possible. The area thus outlined was incised deep to adipose tissue with a #15 scalpel blade with preservation of a vascular pedicle.  The skin margins were undermined to an appropriate distance in all directions utilizing iris scissors.  The flap was then placed into the defect and anchored with interrupted buried subcutaneous sutures.
Mercedes Flap Text: The defect edges were debeveled with a #15 scalpel blade.  Given the location of the defect, shape of the defect and the proximity to free margins a Mercedes flap was deemed most appropriate.  Using a sterile surgical marker, an appropriate advancement flap was drawn incorporating the defect and placing the expected incisions within the relaxed skin tension lines where possible. The area thus outlined was incised deep to adipose tissue with a #15 scalpel blade.  The skin margins were undermined to an appropriate distance in all directions utilizing iris scissors.
Vermilion Border Text: The closure involved the vermilion border.
Consent (Lip)/Introductory Paragraph: The rationale for Mohs was explained to the patient and consent was obtained. The risks, benefits and alternatives to therapy were discussed in detail. Specifically, the risks of lip deformity, changes in the oral aperture, infection, scarring, bleeding, prolonged wound healing, incomplete removal, allergy to anesthesia, nerve injury and recurrence were addressed. Prior to the procedure, the treatment site was clearly identified and confirmed by the patient. All components of Universal Protocol/PAUSE Rule completed.
Mustarde Flap Text: The defect edges were debeveled with a #15 scalpel blade.  Given the size, depth and location of the defect and the proximity to free margins a Mustarde flap was deemed most appropriate.  Using a sterile surgical marker, an appropriate flap was drawn incorporating the defect. The area thus outlined was incised with a #15 scalpel blade.  The skin margins were undermined to an appropriate distance in all directions utilizing iris scissors.
Hemigard Retention Suture: 0-0 Nylon
Eye Protection Verbiage: Before proceeding with the stage, a plastic scleral shield was inserted. The globe was anesthetized with a few drops of 1% lidocaine with 1:100,000 epinephrine. Then, an appropriate sized scleral shield was chosen and coated with lacrilube ointment. The shield was gently inserted and left in place for the duration of each stage. After the stage was completed, the shield was gently removed.
Purse String (Simple) Text: Given the location of the defect and the characteristics of the surrounding skin a purse string closure was deemed most appropriate.  Undermining was performed circumfirentially around the surgical defect.  A purse string suture was then placed and tightened.
No Residual Tumor Seen Histology Text: There were no malignant cells seen in the sections examined.
Abbe Flap (Upper To Lower Lip) Text: The defect of the lower lip was assessed and measured.  Given the location and size of the defect, an Abbe flap was deemed most appropriate.  Using a sterile surgical marker, an appropriate Abbe flap was measured and drawn on the upper lip. Local anesthesia was then infiltrated.  A scalpel was then used to incise the upper lip through and through the skin, vermilion, muscle and mucosa, leaving the flap pedicled on the opposite side.  The flap was then rotated and transferred to the lower lip defect.  The flap was then sutured into place with a three layer technique, closing the orbicularis oris muscle layer with subcutaneous buried sutures, followed by a mucosal layer and an epidermal layer.
Wound Care (No Sutures): Petrolatum
Epidermal Closure: running cuticular
Spiral Flap Text: The defect edges were debeveled with a #15 scalpel blade.  Given the location of the defect, shape of the defect and the proximity to free margins a spiral flap was deemed most appropriate.  Using a sterile surgical marker, an appropriate rotation flap was drawn incorporating the defect and placing the expected incisions within the relaxed skin tension lines where possible. The area thus outlined was incised deep to adipose tissue with a #15 scalpel blade.  The skin margins were undermined to an appropriate distance in all directions utilizing iris scissors.
Helical Rim Text: The closure involved the helical rim.
Double Island Pedicle Flap Text: The defect edges were debeveled with a #15 scalpel blade.  Given the location of the defect, shape of the defect and the proximity to free margins a double island pedicle advancement flap was deemed most appropriate.  Using a sterile surgical marker, an appropriate advancement flap was drawn incorporating the defect, outlining the appropriate donor tissue and placing the expected incisions within the relaxed skin tension lines where possible.    The area thus outlined was incised deep to adipose tissue with a #15 scalpel blade.  The skin margins were undermined to an appropriate distance in all directions around the primary defect and laterally outward around the island pedicle utilizing iris scissors.  There was minimal undermining beneath the pedicle flap.
Hatchet Flap Text: The defect edges were debeveled with a #15 scalpel blade.  Given the location of the defect, shape of the defect and the proximity to free margins a hatchet flap was deemed most appropriate.  Using a sterile surgical marker, an appropriate hatchet flap was drawn incorporating the defect and placing the expected incisions within the relaxed skin tension lines where possible.    The area thus outlined was incised deep to adipose tissue with a #15 scalpel blade.  The skin margins were undermined to an appropriate distance in all directions utilizing iris scissors.
Closure 4 Information: This tab is for additional flaps and grafts above and beyond our usual structured repairs.  Please note if you enter information here it will not currently bill and you will need to add the billing information manually.
Tarsorrhaphy Text: A tarsorrhaphy was performed using Frost sutures.
Paramedian Forehead Flap Text: A decision was made to reconstruct the defect utilizing an interpolation axial flap and a staged reconstruction.  A telfa template was made of the defect.  This telfa template was then used to outline the paramedian forehead pedicle flap.  The donor area for the pedicle flap was then injected with anesthesia.  The flap was excised through the skin and subcutaneous tissue down to the layer of the underlying musculature.  The pedicle flap was carefully excised within this deep plane to maintain its blood supply.  The edges of the donor site were undermined.   The donor site was closed in a primary fashion.  The pedicle was then rotated into position and sutured.  Once the tube was sutured into place, adequate blood supply was confirmed with blanching and refill.  The pedicle was then wrapped with xeroform gauze and dressed appropriately with a telfa and gauze bandage to ensure continued blood supply and protect the attached pedicle.
Initial Size Of Lesion: 5.8
Partial Purse String (Intermediate) Text: Given the location of the defect and the characteristics of the surrounding skin an intermediate purse string closure was deemed most appropriate.  Undermining was performed circumfirentially around the surgical defect.  A purse string suture was then placed and tightened. Wound tension only allowed a partial closure of the circular defect.
Bcc Infiltrative Histology Text: There were numerous aggregates of basaloid cells demonstrating an infiltrative pattern.
Star Wedge Flap Text: The defect edges were debeveled with a #15 scalpel blade.  Given the location of the defect, shape of the defect and the proximity to free margins a star wedge flap was deemed most appropriate.  Using a sterile surgical marker, an appropriate rotation flap was drawn incorporating the defect and placing the expected incisions within the relaxed skin tension lines where possible. The area thus outlined was incised deep to adipose tissue with a #15 scalpel blade.  The skin margins were undermined to an appropriate distance in all directions utilizing iris scissors.
Area L Indication Text: Tumors in this location are included in Area L (trunk and extremities).  Mohs surgery is indicated for larger tumors, or tumors with aggressive histologic features, in these anatomic locations.
Wound Check: 6 weeks
Epidermal Sutures: 3-0 Surgipro
Intermediate Repair And Flap Additional Text (Will Appearing After The Standard Complex Repair Text): The intermediate repair was not sufficient to completely close the primary defect. The remaining additional defect was repaired with the flap mentioned below.
Subsequent Stages Histo Method Verbiage: Using a similar technique to that described above, a thin layer of tissue was removed from all areas where tumor was visible on the previous stage.  The tissue was again oriented, mapped, dyed, and processed as above.
Depth Of Tumor Invasion (For Histology): dermis
Hemigard Intro: Due to skin fragility and wound tension, it was decided to use HEMIGARD adhesive retention suture devices to permit a linear closure. The skin was cleaned and dried for a 6cm distance away from the wound. Excessive hair, if present, was removed to allow for adhesion.
Z Plasty Text: The lesion was extirpated to the level of the fat with a #15 scalpel blade.  Given the location of the defect, shape of the defect and the proximity to free margins a Z-plasty was deemed most appropriate for repair.  Using a sterile surgical marker, the appropriate transposition arms of the Z-plasty were drawn incorporating the defect and placing the expected incisions within the relaxed skin tension lines where possible.    The area thus outlined was incised deep to adipose tissue with a #15 scalpel blade.  The skin margins were undermined to an appropriate distance in all directions utilizing iris scissors.  The opposing transposition arms were then transposed into place in opposite direction and anchored with interrupted buried subcutaneous sutures.
Surgical Defect Width In Cm (Optional): 1.4
Ear Wedge Repair Text: A wedge excision was completed by carrying down an excision through the full thickness of the ear and cartilage with an inward facing Burow's triangle. The wound was then closed in a layered fashion.
Simple / Intermediate / Complex Repair - Final Wound Length In Cm: 9.2
Dorsal Nasal Flap Text: The defect edges were debeveled with a #15 scalpel blade.  Given the location of the defect and the proximity to free margins a dorsal nasal flap was deemed most appropriate.  Using a sterile surgical marker, an appropriate dorsal nasal flap was drawn around the defect.    The area thus outlined was incised deep to adipose tissue with a #15 scalpel blade.  The skin margins were undermined to an appropriate distance in all directions utilizing iris scissors.
Primary Defect Width In Cm (Final Defect Size - Required For Flaps/Grafts): 1.8
Area H Indication Text: Tumors in this location are included in Area H (eyelids, eyebrows, nose, lips, chin, ear, pre-auricular, post-auricular, temple, genitalia, hands, feet, ankles and areola).  Tissue conservation is critical in these anatomic locations.
Advancement Flap (Single) Text: The defect edges were debeveled with a #15 scalpel blade.  Given the location of the defect and the proximity to free margins a single advancement flap was deemed most appropriate.  Using a sterile surgical marker, an appropriate advancement flap was drawn incorporating the defect and placing the expected incisions within the relaxed skin tension lines where possible.    The area thus outlined was incised deep to adipose tissue with a #15 scalpel blade.  The skin margins were undermined to an appropriate distance in all directions utilizing iris scissors.
Referring Physician (Optional): [place]
Dermal Autograft Text: The defect edges were debeveled with a #15 scalpel blade.  Given the location of the defect, shape of the defect and the proximity to free margins a dermal autograft was deemed most appropriate.  Using a sterile surgical marker, the primary defect shape was transferred to the donor site. The area thus outlined was incised deep to adipose tissue with a #15 scalpel blade.  The harvested graft was then trimmed of adipose and epidermal tissue until only dermis was left.  The skin graft was then placed in the primary defect and oriented appropriately.
Staged Advancement Flap Text: The defect edges were debeveled with a #15 scalpel blade.  Given the location of the defect, shape of the defect and the proximity to free margins a staged advancement flap was deemed most appropriate.  Using a sterile surgical marker, an appropriate advancement flap was drawn incorporating the defect and placing the expected incisions within the relaxed skin tension lines where possible. The area thus outlined was incised deep to adipose tissue with a #15 scalpel blade.  The skin margins were undermined to an appropriate distance in all directions utilizing iris scissors.
Alar Island Pedicle Flap Text: The defect edges were debeveled with a #15 scalpel blade.  Given the location of the defect, shape of the defect and the proximity to the alar rim an island pedicle advancement flap was deemed most appropriate.  Using a sterile surgical marker, an appropriate advancement flap was drawn incorporating the defect, outlining the appropriate donor tissue and placing the expected incisions within the nasal ala running parallel to the alar rim. The area thus outlined was incised with a #15 scalpel blade.  The skin margins were undermined minimally to an appropriate distance in all directions around the primary defect and laterally outward around the island pedicle utilizing iris scissors.  There was minimal undermining beneath the pedicle flap.
Complex Repair And Flap Additional Text (Will Appearing After The Standard Complex Repair Text): The complex repair was not sufficient to completely close the primary defect. The remaining additional defect was repaired with the flap mentioned below.
Posterior Auricular Interpolation Flap Text: A decision was made to reconstruct the defect utilizing an interpolation axial flap and a staged reconstruction.  A telfa template was made of the defect.  This telfa template was then used to outline the posterior auricular interpolation flap.  The donor area for the pedicle flap was then injected with anesthesia.  The flap was excised through the skin and subcutaneous tissue down to the layer of the underlying musculature.  The pedicle flap was carefully excised within this deep plane to maintain its blood supply.  The edges of the donor site were undermined.   The donor site was closed in a primary fashion.  The pedicle was then rotated into position and sutured.  Once the tube was sutured into place, adequate blood supply was confirmed with blanching and refill.  The pedicle was then wrapped with xeroform gauze and dressed appropriately with a telfa and gauze bandage to ensure continued blood supply and protect the attached pedicle.
Consent 2/Introductory Paragraph: Mohs surgery was explained to the patient and consent was obtained. The risks, benefits and alternatives to therapy were discussed in detail. Specifically, the risks of infection, scarring, bleeding, prolonged wound healing, incomplete removal, allergy to anesthesia, nerve injury and recurrence were addressed. Prior to the procedure, the treatment site was clearly identified and confirmed by the patient. All components of Universal Protocol/PAUSE Rule completed.
H Plasty Text: Given the location of the defect, shape of the defect and the proximity to free margins a H-plasty was deemed most appropriate for repair.  Using a sterile surgical marker, the appropriate advancement arms of the H-plasty were drawn incorporating the defect and placing the expected incisions within the relaxed skin tension lines where possible. The area thus outlined was incised deep to adipose tissue with a #15 scalpel blade. The skin margins were undermined to an appropriate distance in all directions utilizing iris scissors.  The opposing advancement arms were then advanced into place in opposite direction and anchored with interrupted buried subcutaneous sutures.
Bilateral Rotation Flap Text: The defect edges were debeveled with a #15 scalpel blade. Given the location of the defect, shape of the defect and the proximity to free margins a bilateral rotation flap was deemed most appropriate. Using a sterile surgical marker, an appropriate rotation flap was drawn incorporating the defect and placing the expected incisions within the relaxed skin tension lines where possible. The area thus outlined was incised deep to adipose tissue with a #15 scalpel blade. The skin margins were undermined to an appropriate distance in all directions utilizing iris scissors. Following this, the designed flap was carried over into the primary defect and sutured into place.
Staging Info: By selecting yes to the question above you will include information on AJCC 8 tumor staging in your Mohs note. Information on tumor staging will be automatically added for SCCs on the head and neck. AJCC 8 includes tumor size, tumor depth, perineural involvement and bone invasion.
Burow's Advancement Flap Text: The defect edges were debeveled with a #15 scalpel blade.  Given the location of the defect and the proximity to free margins a Burow's advancement flap was deemed most appropriate.  Using a sterile surgical marker, the appropriate advancement flap was drawn incorporating the defect and placing the expected incisions within the relaxed skin tension lines where possible.    The area thus outlined was incised deep to adipose tissue with a #15 scalpel blade.  The skin margins were undermined to an appropriate distance in all directions utilizing iris scissors.
Detail Level: Detailed
Consent 1/Introductory Paragraph: The rationale for Mohs was explained to the patient and consent was obtained. The risks, benefits and alternatives to therapy were discussed in detail. Specifically, the risks of infection, scarring, bleeding, prolonged wound healing, incomplete removal, allergy to anesthesia, nerve injury and recurrence were addressed. Prior to the procedure, the treatment site was clearly identified and confirmed by the patient. All components of Universal Protocol/PAUSE Rule completed.
Repair Anesthesia Method: local infiltration
Graft Donor Site Bandage (Optional-Leave Blank If You Don't Want In Note): Aquaplast was fitted to the graft site and sewn into place. A pressure bandage were applied to the donor site and over the aquaplast bolster.
V-Y Flap Text: The defect edges were debeveled with a #15 scalpel blade.  Given the location of the defect, shape of the defect and the proximity to free margins a V-Y flap was deemed most appropriate.  Using a sterile surgical marker, an appropriate advancement flap was drawn incorporating the defect and placing the expected incisions within the relaxed skin tension lines where possible.    The area thus outlined was incised deep to adipose tissue with a #15 scalpel blade.  The skin margins were undermined to an appropriate distance in all directions utilizing iris scissors.
Intermediate Repair And Graft Additional Text (Will Appearing After The Standard Complex Repair Text): The intermediate repair was not sufficient to completely close the primary defect. The remaining additional defect was repaired with the graft mentioned below.
Split-Thickness Skin Graft Text: The defect edges were debeveled with a #15 scalpel blade.  Given the location of the defect, shape of the defect and the proximity to free margins a split thickness skin graft was deemed most appropriate.  Using a sterile surgical marker, the primary defect shape was transferred to the donor site. The split thickness graft was then harvested.  The skin graft was then placed in the primary defect and oriented appropriately.
Inflammation Suggestive Of Cancer Camouflage Histology Text: There was a dense lymphocytic infiltrate which prevented adequate histologic evaluation of adjacent structures.
O-T Plasty Text: The defect edges were debeveled with a #15 scalpel blade.  Given the location of the defect, shape of the defect and the proximity to free margins an O-T plasty was deemed most appropriate.  Using a sterile surgical marker, an appropriate O-T plasty was drawn incorporating the defect and placing the expected incisions within the relaxed skin tension lines where possible.    The area thus outlined was incised deep to adipose tissue with a #15 scalpel blade.  The skin margins were undermined to an appropriate distance in all directions utilizing iris scissors.
Orbicularis Oris Muscle Flap Text: The defect edges were debeveled with a #15 scalpel blade.  Given that the defect affected the competency of the oral sphincter an orbicularis oris muscle flap was deemed most appropriate to restore this competency and normal muscle function.  Using a sterile surgical marker, an appropriate flap was drawn incorporating the defect. The area thus outlined was incised with a #15 scalpel blade.
Bi-Rhombic Flap Text: The defect edges were debeveled with a #15 scalpel blade.  Given the location of the defect and the proximity to free margins a bi-rhombic flap was deemed most appropriate.  Using a sterile surgical marker, an appropriate rhombic flap was drawn incorporating the defect. The area thus outlined was incised deep to adipose tissue with a #15 scalpel blade.  The skin margins were undermined to an appropriate distance in all directions utilizing iris scissors.
Where Do You Want The Question To Include Opioid Counseling Located?: Case Summary Tab
Consent (Temporal Branch)/Introductory Paragraph: The rationale for Mohs was explained to the patient and consent was obtained. The risks, benefits and alternatives to therapy were discussed in detail. Specifically, the risks of damage to the temporal branch of the facial nerve, infection, scarring, bleeding, prolonged wound healing, incomplete removal, allergy to anesthesia, and recurrence were addressed. Prior to the procedure, the treatment site was clearly identified and confirmed by the patient. All components of Universal Protocol/PAUSE Rule completed.
Estimated Blood Loss (Cc): minimal
Same Histology In Subsequent Stages Text: The pattern and morphology of the tumor is as described in the first stage.
Epidermal Autograft Text: The defect edges were debeveled with a #15 scalpel blade.  Given the location of the defect, shape of the defect and the proximity to free margins an epidermal autograft was deemed most appropriate.  Using a sterile surgical marker, the primary defect shape was transferred to the donor site. The epidermal graft was then harvested.  The skin graft was then placed in the primary defect and oriented appropriately.
Consent (Near Eyelid Margin)/Introductory Paragraph: The rationale for Mohs was explained to the patient and consent was obtained. The risks, benefits and alternatives to therapy were discussed in detail. Specifically, the risks of ectropion or eyelid deformity, infection, scarring, bleeding, prolonged wound healing, incomplete removal, allergy to anesthesia, nerve injury and recurrence were addressed. Prior to the procedure, the treatment site was clearly identified and confirmed by the patient. All components of Universal Protocol/PAUSE Rule completed.
Rhombic Flap Text: The defect edges were debeveled with a #15 scalpel blade.  Given the location of the defect and the proximity to free margins a rhombic flap was deemed most appropriate.  Using a sterile surgical marker, an appropriate rhombic flap was drawn incorporating the defect.    The area thus outlined was incised deep to adipose tissue with a #15 scalpel blade.  The skin margins were undermined to an appropriate distance in all directions utilizing iris scissors.
Consent (Scalp)/Introductory Paragraph: The rationale for Mohs was explained to the patient and consent was obtained. The risks, benefits and alternatives to therapy were discussed in detail. Specifically, the risks of changes in hair growth pattern secondary to repair, infection, scarring, bleeding, prolonged wound healing, incomplete removal, allergy to anesthesia, nerve injury and recurrence were addressed. Prior to the procedure, the treatment site was clearly identified and confirmed by the patient. All components of Universal Protocol/PAUSE Rule completed.
Mart-1 - Negative Histology Text: MART-1 staining demonstrates a normal density and pattern of melanocytes along the dermal-epidermal junction. The surgical margins are negative for tumor cells.
Cheek-To-Nose Interpolation Flap Text: A decision was made to reconstruct the defect utilizing an interpolation axial flap and a staged reconstruction.  A telfa template was made of the defect.  This telfa template was then used to outline the Cheek-To-Nose Interpolation flap.  The donor area for the pedicle flap was then injected with anesthesia.  The flap was excised through the skin and subcutaneous tissue down to the layer of the underlying musculature.  The interpolation flap was carefully excised within this deep plane to maintain its blood supply.  The edges of the donor site were undermined.   The donor site was closed in a primary fashion.  The pedicle was then rotated into position and sutured.  Once the tube was sutured into place, adequate blood supply was confirmed with blanching and refill.  The pedicle was then wrapped with xeroform gauze and dressed appropriately with a telfa and gauze bandage to ensure continued blood supply and protect the attached pedicle.
A-T Advancement Flap Text: The defect edges were debeveled with a #15 scalpel blade.  Given the location of the defect, shape of the defect and the proximity to free margins an A-T advancement flap was deemed most appropriate.  Using a sterile surgical marker, an appropriate advancement flap was drawn incorporating the defect and placing the expected incisions within the relaxed skin tension lines where possible.    The area thus outlined was incised deep to adipose tissue with a #15 scalpel blade.  The skin margins were undermined to an appropriate distance in all directions utilizing iris scissors.
No Repair - Repaired With Adjacent Surgical Defect Text (Leave Blank If You Do Not Want): After obtaining clear surgical margins the defect was repaired concurrently with another surgical defect which was in close approximation.
Consent (Ear)/Introductory Paragraph: The rationale for Mohs was explained to the patient and consent was obtained. The risks, benefits and alternatives to therapy were discussed in detail. Specifically, the risks of ear deformity, infection, scarring, bleeding, prolonged wound healing, incomplete removal, allergy to anesthesia, nerve injury and recurrence were addressed. Prior to the procedure, the treatment site was clearly identified and confirmed by the patient. All components of Universal Protocol/PAUSE Rule completed.
Full Thickness Lip Wedge Repair (Flap) Text: Given the location of the defect and the proximity to free margins a full thickness wedge repair was deemed most appropriate.  Using a sterile surgical marker, the appropriate repair was drawn incorporating the defect and placing the expected incisions perpendicular to the vermilion border.  The vermilion border was also meticulously outlined to ensure appropriate reapproximation during the repair.  The area thus outlined was incised through and through with a #15 scalpel blade.  The muscularis and dermis were reaproximated with deep sutures following hemostasis. Care was taken to realign the vermilion border before proceeding with the superficial closure.  Once the vermilion was realigned the superfical and mucosal closure was finished.
Dressing: pressure dressing with telfa
O-Z Flap Text: The defect edges were debeveled with a #15 scalpel blade.  Given the location of the defect, shape of the defect and the proximity to free margins an O-Z flap was deemed most appropriate.  Using a sterile surgical marker, an appropriate transposition flap was drawn incorporating the defect and placing the expected incisions within the relaxed skin tension lines where possible. The area thus outlined was incised deep to adipose tissue with a #15 scalpel blade.  The skin margins were undermined to an appropriate distance in all directions utilizing iris scissors.
Mastoid Interpolation Flap Text: A decision was made to reconstruct the defect utilizing an interpolation axial flap and a staged reconstruction.  A telfa template was made of the defect.  This telfa template was then used to outline the mastoid interpolation flap.  The donor area for the pedicle flap was then injected with anesthesia.  The flap was excised through the skin and subcutaneous tissue down to the layer of the underlying musculature.  The pedicle flap was carefully excised within this deep plane to maintain its blood supply.  The edges of the donor site were undermined.   The donor site was closed in a primary fashion.  The pedicle was then rotated into position and sutured.  Once the tube was sutured into place, adequate blood supply was confirmed with blanching and refill.  The pedicle was then wrapped with xeroform gauze and dressed appropriately with a telfa and gauze bandage to ensure continued blood supply and protect the attached pedicle.
Surgical Defect Length In Cm (Optional): 6.2
Consent 3/Introductory Paragraph: I gave the patient a chance to ask questions they had about the procedure.  Following this I explained the Mohs procedure and consent was obtained. The risks, benefits and alternatives to therapy were discussed in detail. Specifically, the risks of infection, scarring, bleeding, prolonged wound healing, incomplete removal, allergy to anesthesia, nerve injury and recurrence were addressed. Prior to the procedure, the treatment site was clearly identified and confirmed by the patient. All components of Universal Protocol/PAUSE Rule completed.
Helical Rim Advancement Flap Text: The defect edges were debeveled with a #15 blade scalpel.  Given the location of the defect and the proximity to free margins (helical rim) a double helical rim advancement flap was deemed most appropriate.  Using a sterile surgical marker, the appropriate advancement flaps were drawn incorporating the defect and placing the expected incisions between the helical rim and antihelix where possible.  The area thus outlined was incised through and through with a #15 scalpel blade.  With a skin hook and iris scissors, the flaps were gently and sharply undermined and freed up.
Suturegard Body: The suture ends were repeatedly re-tightened and re-clamped to achieve the desired tissue expansion.
Tumor Debulked?: curette
Tissue Cultured Epidermal Autograft Text: The defect edges were debeveled with a #15 scalpel blade.  Given the location of the defect, shape of the defect and the proximity to free margins a tissue cultured epidermal autograft was deemed most appropriate.  The graft was then trimmed to fit the size of the defect.  The graft was then placed in the primary defect and oriented appropriately.
Retention Suture Text: Retention sutures were placed to support the closure and prevent dehiscence.
Presence Of Scar Tissue (For Histology): present
Information: Selecting Yes will display possible errors in your note based on the variables you have selected. This validation is only offered as a suggestion for you. PLEASE NOTE THAT THE VALIDATION TEXT WILL BE REMOVED WHEN YOU FINALIZE YOUR NOTE. IF YOU WANT TO FAX A PRELIMINARY NOTE YOU WILL NEED TO TOGGLE THIS TO 'NO' IF YOU DO NOT WANT IT IN YOUR FAXED NOTE.
Area M Indication Text: Tumors in this location are included in Area M (cheek, forehead, scalp, neck, jawline and pretibial skin).  Mohs surgery is indicated for tumors in these anatomic locations.
Chonodrocutaneous Helical Advancement Flap Text: The defect edges were debeveled with a #15 scalpel blade.  Given the location of the defect and the proximity to free margins a chondrocutaneous helical advancement flap was deemed most appropriate.  Using a sterile surgical marker, the appropriate advancement flap was drawn incorporating the defect and placing the expected incisions within the relaxed skin tension lines where possible.    The area thus outlined was incised deep to adipose tissue with a #15 scalpel blade.  The skin margins were undermined to an appropriate distance in all directions utilizing iris scissors.
Manual Repair Warning Statement: We plan on removing the manually selected variable below in favor of our much easier automatic structured text blocks found in the previous tab. We decided to do this to help make the flow better and give you the full power of structured data. Manual selection is never going to be ideal in our platform and I would encourage you to avoid using manual selection from this point on, especially since I will be sunsetting this feature. It is important that you do one of two things with the customized text below. First, you can save all of the text in a word file so you can have it for future reference. Second, transfer the text to the appropriate area in the Library tab. Lastly, if there is a flap or graft type which we do not have you need to let us know right away so I can add it in before the variable is hidden. No need to panic, we plan to give you roughly 6 months to make the change.
Island Pedicle Flap Text: The defect edges were debeveled with a #15 scalpel blade.  Given the location of the defect, shape of the defect and the proximity to free margins an island pedicle advancement flap was deemed most appropriate.  Using a sterile surgical marker, an appropriate advancement flap was drawn incorporating the defect, outlining the appropriate donor tissue and placing the expected incisions within the relaxed skin tension lines where possible.    The area thus outlined was incised deep to adipose tissue with a #15 scalpel blade.  The skin margins were undermined to an appropriate distance in all directions around the primary defect and laterally outward around the island pedicle utilizing iris scissors.  There was minimal undermining beneath the pedicle flap.
Purse String (Intermediate) Text: Given the location of the defect and the characteristics of the surrounding skin a purse string intermediate closure was deemed most appropriate.  Undermining was performed circumfirentially around the surgical defect.  A purse string suture was then placed and tightened.
Alternatives Discussed Intro (Do Not Add Period): I discussed alternative treatments to Mohs surgery and specifically discussed the risks and benefits of
Home Suture Removal Text: Patient was provided instructions on removing sutures and will remove their sutures at home.  If they have any questions or difficulties they will call the office.
Rotation Flap Text: The defect edges were debeveled with a #15 scalpel blade.  Given the location of the defect, shape of the defect and the proximity to free margins a rotation flap was deemed most appropriate.  Using a sterile surgical marker, an appropriate rotation flap was drawn incorporating the defect and placing the expected incisions within the relaxed skin tension lines where possible.    The area thus outlined was incised deep to adipose tissue with a #15 scalpel blade.  The skin margins were undermined to an appropriate distance in all directions utilizing iris scissors.
Bilobed Transposition Flap Text: The defect edges were debeveled with a #15 scalpel blade.  Given the location of the defect and the proximity to free margins a bilobed transposition flap was deemed most appropriate.  Using a sterile surgical marker, an appropriate bilobe flap drawn around the defect.    The area thus outlined was incised deep to adipose tissue with a #15 scalpel blade.  The skin margins were undermined to an appropriate distance in all directions utilizing iris scissors.
Repair Type: Complex Repair
Undermining Type: Entire Wound
Localized Dermabrasion With Wire Brush Text: The patient was draped in routine manner.  Localized dermabrasion using 3 x 17 mm wire brush was performed in routine manner to papillary dermis. This spot dermabrasion is being performed to complete skin cancer reconstruction. It also will eliminate the other sun damaged precancerous cells that are known to be part of the regional effect of a lifetime's worth of sun exposure. This localized dermabrasion is therapeutic and should not be considered cosmetic in any regard.
Undermining Location (Optional): in the superficial subcutaneous fat
Secondary Intention Text (Leave Blank If You Do Not Want): The defect will heal with secondary intention.
Keystone Flap Text: The defect edges were debeveled with a #15 scalpel blade.  Given the location of the defect, shape of the defect a keystone flap was deemed most appropriate.  Using a sterile surgical marker, an appropriate keystone flap was drawn incorporating the defect, outlining the appropriate donor tissue and placing the expected incisions within the relaxed skin tension lines where possible. The area thus outlined was incised deep to adipose tissue with a #15 scalpel blade.  The skin margins were undermined to an appropriate distance in all directions around the primary defect and laterally outward around the flap utilizing iris scissors.
Composite Graft Text: The defect edges were debeveled with a #15 scalpel blade.  Given the location of the defect, shape of the defect, the proximity to free margins and the fact the defect was full thickness a composite graft was deemed most appropriate.  The defect was outline and then transferred to the donor site.  A full thickness graft was then excised from the donor site. The graft was then placed in the primary defect, oriented appropriately and then sutured into place.  The secondary defect was then repaired using a primary closure.
Tumor Depth: Less than 6mm from granular layer and no invasion beyond the subcutaneous fat
Complex Repair And Graft Additional Text (Will Appearing After The Standard Complex Repair Text): The complex repair was not sufficient to completely close the primary defect. The remaining additional defect was repaired with the graft mentioned below.
Anesthesia Volume In Cc: 9
Island Pedicle Flap With Canthal Suspension Text: The defect edges were debeveled with a #15 scalpel blade.  Given the location of the defect, shape of the defect and the proximity to free margins an island pedicle advancement flap was deemed most appropriate.  Using a sterile surgical marker, an appropriate advancement flap was drawn incorporating the defect, outlining the appropriate donor tissue and placing the expected incisions within the relaxed skin tension lines where possible. The area thus outlined was incised deep to adipose tissue with a #15 scalpel blade.  The skin margins were undermined to an appropriate distance in all directions around the primary defect and laterally outward around the island pedicle utilizing iris scissors.  There was minimal undermining beneath the pedicle flap. A suspension suture was placed in the canthal tendon to prevent tension and prevent ectropion.
Closure 2 Information: This tab is for additional flaps and grafts, including complex repair and grafts and complex repair and flaps. You can also specify a different location for the additional defect, if the location is the same you do not need to select a new one. We will insert the automated text for the repair you select below just as we do for solitary flaps and grafts. Please note that at this time if you select a location with a different insurance zone you will need to override the ICD10 and CPT if appropriate.
Pinch Graft Text: The defect edges were debeveled with a #15 scalpel blade. Given the location of the defect, shape of the defect and the proximity to free margins a pinch graft was deemed most appropriate. Using a sterile surgical marker, the primary defect shape was transferred to the donor site. The area thus outlined was incised deep to adipose tissue with a #15 scalpel blade.  The harvested graft was then trimmed of adipose tissue until only dermis and epidermis was left. The skin margins of the secondary defect were undermined to an appropriate distance in all directions utilizing iris scissors.  The secondary defect was closed with interrupted buried subcutaneous sutures.  The skin edges were then re-apposed with running  sutures.  The skin graft was then placed in the primary defect and oriented appropriately.
Mucosal Advancement Flap Text: Given the location of the defect, shape of the defect and the proximity to free margins a mucosal advancement flap was deemed most appropriate. Incisions were made with a 15 blade scalpel in the appropriate fashion along the cutaneous vermilion border and the mucosal lip. The remaining actinically damaged mucosal tissue was excised.  The mucosal advancement flap was then elevated to the gingival sulcus with care taken to preserve the neurovascular structures and advanced into the primary defect. Care was taken to ensure that precise realignment of the vermilion border was achieved.
Epidermal Closure Graft Donor Site (Optional): running
Transposition Flap Text: The defect edges were debeveled with a #15 scalpel blade.  Given the location of the defect and the proximity to free margins a transposition flap was deemed most appropriate.  Using a sterile surgical marker, an appropriate transposition flap was drawn incorporating the defect.    The area thus outlined was incised deep to adipose tissue with a #15 scalpel blade.  The skin margins were undermined to an appropriate distance in all directions utilizing iris scissors.
Peng Advancement Flap Text: The defect edges were debeveled with a #15 scalpel blade.  Given the location of the defect, shape of the defect and the proximity to free margins a Peng advancement flap was deemed most appropriate.  Using a sterile surgical marker, an appropriate advancement flap was drawn incorporating the defect and placing the expected incisions within the relaxed skin tension lines where possible. The area thus outlined was incised deep to adipose tissue with a #15 scalpel blade.  The skin margins were undermined to an appropriate distance in all directions utilizing iris scissors.
Mohs Rapid Report Verbiage: The area of clinically evident tumor was marked with skin marking ink and appropriately hatched.  The initial incision was made following the Mohs approach through the skin.  The specimen was taken to the lab, divided into the necessary number of pieces, chromacoded and processed according to the Mohs protocol.  This was repeated in successive stages until a tumor free defect was achieved.
Rhomboid Transposition Flap Text: The defect edges were debeveled with a #15 scalpel blade.  Given the location of the defect and the proximity to free margins a rhomboid transposition flap was deemed most appropriate.  Using a sterile surgical marker, an appropriate rhomboid flap was drawn incorporating the defect.    The area thus outlined was incised deep to adipose tissue with a #15 scalpel blade.  The skin margins were undermined to an appropriate distance in all directions utilizing iris scissors.
Bilateral Helical Rim Advancement Flap Text: The defect edges were debeveled with a #15 blade scalpel.  Given the location of the defect and the proximity to free margins (helical rim) a bilateral helical rim advancement flap was deemed most appropriate.  Using a sterile surgical marker, the appropriate advancement flaps were drawn incorporating the defect and placing the expected incisions between the helical rim and antihelix where possible.  The area thus outlined was incised through and through with a #15 scalpel blade.  With a skin hook and iris scissors, the flaps were gently and sharply undermined and freed up.
Graft Donor Site Dermal Sutures (Optional): 5-0 Polysorb
Cartilage Graft Text: The defect edges were debeveled with a #15 scalpel blade.  Given the location of the defect, shape of the defect, the fact the defect involved a full thickness cartilage defect a cartilage graft was deemed most appropriate.  An appropriate donor site was identified, cleansed, and anesthetized. The cartilage graft was then harvested and transferred to the recipient site, oriented appropriately and then sutured into place.  The secondary defect was then repaired using a primary closure.
Nasalis-Muscle-Based Myocutaneous Island Pedicle Flap Text: Using a #15 blade, an incision was made around the donor flap to the level of the nasalis muscle. Wide lateral undermining was then performed in both the subcutaneous plane above the nasalis muscle, and in a submuscular plane just above periosteum. This allowed the formation of a free nasalis muscle axial pedicle (based on the angular artery) which was still attached to the actual cutaneous flap, increasing its mobility and vascular viability. Hemostasis was obtained with pinpoint electrocoagulation. The flap was mobilized into position and the pivotal anchor points positioned and stabilized with buried interrupted sutures. Subcutaneous and dermal tissues were closed in a multilayered fashion with sutures. Tissue redundancies were excised, and the epidermal edges were apposed without significant tension and sutured with sutures.
W Plasty Text: The lesion was extirpated to the level of the fat with a #15 scalpel blade.  Given the location of the defect, shape of the defect and the proximity to free margins a W-plasty was deemed most appropriate for repair.  Using a sterile surgical marker, the appropriate transposition arms of the W-plasty were drawn incorporating the defect and placing the expected incisions within the relaxed skin tension lines where possible.    The area thus outlined was incised deep to adipose tissue with a #15 scalpel blade.  The skin margins were undermined to an appropriate distance in all directions utilizing iris scissors.  The opposing transposition arms were then transposed into place in opposite direction and anchored with interrupted buried subcutaneous sutures.
Surgeon/Pathologist Verbiage (Will Incorporate Name Of Surgeon From Intro If Not Blank): operated in two distinct and integrated capacities as the surgeon and pathologist.
Unna Boot Text: An Unna boot was placed to help immobilize the limb and facilitate more rapid healing.
Hemostasis: Electrocautery
Advancement Flap (Double) Text: The defect edges were debeveled with a #15 scalpel blade.  Given the location of the defect and the proximity to free margins a double advancement flap was deemed most appropriate.  Using a sterile surgical marker, the appropriate advancement flaps were drawn incorporating the defect and placing the expected incisions within the relaxed skin tension lines where possible.    The area thus outlined was incised deep to adipose tissue with a #15 scalpel blade.  The skin margins were undermined to an appropriate distance in all directions utilizing iris scissors.
Melolabial Transposition Flap Text: The defect edges were debeveled with a #15 scalpel blade.  Given the location of the defect and the proximity to free margins a melolabial flap was deemed most appropriate.  Using a sterile surgical marker, an appropriate melolabial transposition flap was drawn incorporating the defect.    The area thus outlined was incised deep to adipose tissue with a #15 scalpel blade.  The skin margins were undermined to an appropriate distance in all directions utilizing iris scissors.
Location Indication Override (Is Already Calculated Based On Selected Body Location): Area L
Post-Care Instructions: I reviewed with the patient in detail post-care instructions. Patient is not to engage in any heavy lifting, exercise, or swimming for the next 14 days. Should the patient develop any fevers, chills, bleeding, severe pain patient will contact the office immediately.
Non-Graft Cartilage Fenestration Text: The cartilage was fenestrated with a 2mm punch biopsy to help facilitate healing.
O-L Flap Text: The defect edges were debeveled with a #15 scalpel blade.  Given the location of the defect, shape of the defect and the proximity to free margins an O-L flap was deemed most appropriate.  Using a sterile surgical marker, an appropriate advancement flap was drawn incorporating the defect and placing the expected incisions within the relaxed skin tension lines where possible.    The area thus outlined was incised deep to adipose tissue with a #15 scalpel blade.  The skin margins were undermined to an appropriate distance in all directions utilizing iris scissors.
Mauc Instructions: By selecting yes to the question below the MAUC number will be added into the note.  This will be calculated automatically based on the diagnosis chosen, the size entered, the body zone selected (H,M,L) and the specific indications you chose. You will also have the option to override the Mohs AUC if you disagree with the automatically calculated number and this option is found in the Case Summary tab.
Cheiloplasty (Less Than 50%) Text: A decision was made to reconstruct the defect with a  cheiloplasty.  The defect was undermined extensively.  Additional obicularis oris muscle was excised with a 15 blade scalpel.  The defect was converted into a full thickness wedge, of less than 50% of the vertical height of the lip, to facilite a better cosmetic result.  Small vessels were then tied off with 5-0 monocyrl. The obicularis oris, superficial fascia, adipose and dermis were then reapproximated.  After the deeper layers were approximated the epidermis was reapproximated with particular care given to realign the vermilion border.
Adjacent Tissue Transfer Text: The defect edges were debeveled with a #15 scalpel blade.  Given the location of the defect and the proximity to free margins an adjacent tissue transfer was deemed most appropriate.  Using a sterile surgical marker, an appropriate flap was drawn incorporating the defect and placing the expected incisions within the relaxed skin tension lines where possible.    The area thus outlined was incised deep to adipose tissue with a #15 scalpel blade.  The skin margins were undermined to an appropriate distance in all directions utilizing iris scissors.
O-T Advancement Flap Text: The defect edges were debeveled with a #15 scalpel blade.  Given the location of the defect, shape of the defect and the proximity to free margins an O-T advancement flap was deemed most appropriate.  Using a sterile surgical marker, an appropriate advancement flap was drawn incorporating the defect and placing the expected incisions within the relaxed skin tension lines where possible.    The area thus outlined was incised deep to adipose tissue with a #15 scalpel blade.  The skin margins were undermined to an appropriate distance in all directions utilizing iris scissors.
Crescentic Advancement Flap Text: The defect edges were debeveled with a #15 scalpel blade.  Given the location of the defect and the proximity to free margins a crescentic advancement flap was deemed most appropriate.  Using a sterile surgical marker, the appropriate advancement flap was drawn incorporating the defect and placing the expected incisions within the relaxed skin tension lines where possible.    The area thus outlined was incised deep to adipose tissue with a #15 scalpel blade.  The skin margins were undermined to an appropriate distance in all directions utilizing iris scissors.
Burow's Graft Text: The defect edges were debeveled with a #15 scalpel blade.  Given the location of the defect, shape of the defect, the proximity to free margins and the presence of a standing cone deformity a Burow's skin graft was deemed most appropriate. The standing cone was removed and this tissue was then trimmed to the shape of the primary defect. The adipose tissue was also removed until only dermis and epidermis were left.  The skin margins of the secondary defect were undermined to an appropriate distance in all directions utilizing iris scissors.  The secondary defect was closed with interrupted buried subcutaneous sutures.  The skin edges were then re-apposed with running  sutures.  The skin graft was then placed in the primary defect and oriented appropriately.
Additional Anesthesia Volume In Cc: 6
Advancement-Rotation Flap Text: The defect edges were debeveled with a #15 scalpel blade.  Given the location of the defect, shape of the defect and the proximity to free margins an advancement-rotation flap was deemed most appropriate.  Using a sterile surgical marker, an appropriate flap was drawn incorporating the defect and placing the expected incisions within the relaxed skin tension lines where possible. The area thus outlined was incised deep to adipose tissue with a #15 scalpel blade.  The skin margins were undermined to an appropriate distance in all directions utilizing iris scissors.
Previous Accession (Optional): [id number]
Suturegard Intro: Intraoperative tissue expansion was performed, utilizing the SUTUREGARD device, in order to reduce wound tension.
Retention Suture Bite Size: 1 mm
Perineural Invasion (For Histology - Be Specific If Possible): absent
Island Pedicle Flap-Requiring Vessel Identification Text: The defect edges were debeveled with a #15 scalpel blade.  Given the location of the defect, shape of the defect and the proximity to free margins an island pedicle advancement flap was deemed most appropriate.  Using a sterile surgical marker, an appropriate advancement flap was drawn, based on the axial vessel mentioned above, incorporating the defect, outlining the appropriate donor tissue and placing the expected incisions within the relaxed skin tension lines where possible.    The area thus outlined was incised deep to adipose tissue with a #15 scalpel blade.  The skin margins were undermined to an appropriate distance in all directions around the primary defect and laterally outward around the island pedicle utilizing iris scissors.  There was minimal undermining beneath the pedicle flap.
Double Z Plasty Text: The lesion was extirpated to the level of the fat with a #15 scalpel blade. Given the location of the defect, shape of the defect and the proximity to free margins a double Z-plasty was deemed most appropriate for repair. Using a sterile surgical marker, the appropriate transposition arms of the double Z-plasty were drawn incorporating the defect and placing the expected incisions within the relaxed skin tension lines where possible. The area thus outlined was incised deep to adipose tissue with a #15 scalpel blade. The skin margins were undermined to an appropriate distance in all directions utilizing iris scissors. The opposing transposition arms were then transposed and carried over into place in opposite direction and anchored with interrupted buried subcutaneous sutures.
Hemigard Postcare Instructions: The HEMIGARD strips are to remain completely dry for at least 5-7 days.
Mohs Case Number: [id number]
Anesthesia Volume In Cc: 3
Graft Cartilage Fenestration Text: The cartilage was fenestrated with a 2mm punch biopsy to help facilitate graft survival and healing.
Deep Sutures: 3-0 Maxon
Mart-1 - Positive Histology Text: MART-1 staining demonstrates areas of higher density and clustering of melanocytes with Pagetoid spread upwards within the epidermis. The surgical margins are positive for tumor cells.
Consent (Spinal Accessory)/Introductory Paragraph: The rationale for Mohs was explained to the patient and consent was obtained. The risks, benefits and alternatives to therapy were discussed in detail. Specifically, the risks of damage to the spinal accessory nerve, infection, scarring, bleeding, prolonged wound healing, incomplete removal, allergy to anesthesia, and recurrence were addressed. Prior to the procedure, the treatment site was clearly identified and confirmed by the patient. All components of Universal Protocol/PAUSE Rule completed.
O-Z Plasty Text: The defect edges were debeveled with a #15 scalpel blade.  Given the location of the defect, shape of the defect and the proximity to free margins an O-Z plasty (double transposition flap) was deemed most appropriate.  Using a sterile surgical marker, the appropriate transposition flaps were drawn incorporating the defect and placing the expected incisions within the relaxed skin tension lines where possible.    The area thus outlined was incised deep to adipose tissue with a #15 scalpel blade.  The skin margins were undermined to an appropriate distance in all directions utilizing iris scissors.  Hemostasis was achieved with electrocautery.  The flaps were then transposed into place, one clockwise and the other counterclockwise, and anchored with interrupted buried subcutaneous sutures.
Double O-Z Flap Text: The defect edges were debeveled with a #15 scalpel blade.  Given the location of the defect, shape of the defect and the proximity to free margins a Double O-Z flap was deemed most appropriate.  Using a sterile surgical marker, an appropriate transposition flap was drawn incorporating the defect and placing the expected incisions within the relaxed skin tension lines where possible. The area thus outlined was incised deep to adipose tissue with a #15 scalpel blade.  The skin margins were undermined to an appropriate distance in all directions utilizing iris scissors.
Xenograft Text: The defect edges were debeveled with a #15 scalpel blade.  Given the location of the defect, shape of the defect and the proximity to free margins a xenograft was deemed most appropriate.  The graft was then trimmed to fit the size of the defect.  The graft was then placed in the primary defect and oriented appropriately.

## 2023-11-03 ENCOUNTER — HOSPITAL ENCOUNTER (EMERGENCY)
Facility: MEDICAL CENTER | Age: 78
End: 2023-11-03
Attending: STUDENT IN AN ORGANIZED HEALTH CARE EDUCATION/TRAINING PROGRAM
Payer: COMMERCIAL

## 2023-11-03 VITALS
HEIGHT: 69 IN | WEIGHT: 130.51 LBS | TEMPERATURE: 97.8 F | RESPIRATION RATE: 18 BRPM | OXYGEN SATURATION: 97 % | DIASTOLIC BLOOD PRESSURE: 88 MMHG | HEART RATE: 74 BPM | BODY MASS INDEX: 19.33 KG/M2 | SYSTOLIC BLOOD PRESSURE: 141 MMHG

## 2023-11-03 DIAGNOSIS — T14.8XXA BLEEDING FROM WOUND: ICD-10-CM

## 2023-11-03 PROCEDURE — 303353 HCHG DERMABOND SKIN ADHESIVE

## 2023-11-03 PROCEDURE — 304999 HCHG REPAIR-SIMPLE/INTERMED LEVEL 1

## 2023-11-03 PROCEDURE — 99284 EMERGENCY DEPT VISIT MOD MDM: CPT

## 2023-11-04 NOTE — ED NOTES
Assessed the patient's surgical site incision that has been bleeding for the past hour per patient.  Skin cancer removal done on Wednesday and small bleeding to the Right MS chest area.  Dressing changed and surgicel placed.

## 2023-11-04 NOTE — ED PROVIDER NOTES
ED Provider Note    CHIEF COMPLAINT  Chief Complaint   Patient presents with    Bleeding/Bruising     Left Chest   Cancer removal on Wednesday, started actively bleeding while lying in bed  Currently taking Eliquis         EXTERNAL RECORDS REVIEWED  Outpatient notes Seen by Neurology  for possible Parkinson's Dz    HPI/ROS  LIMITATION TO HISTORY   Select: : None  OUTSIDE HISTORIAN(S):  Significant other At bedside adds to history below    Laci Baugh is a 78 y.o. male who presents with concern for bleeding chest wound.  Patient says that he had Mohs surgery on Wednesday for skin cancer removal.  He noticed that the wound bleeding started whilst lying in bed today.  He was unable to stop the bleeding with pressure.  He denies any dizziness, lightheadedness or syncope.  He is on Eliquis and compliant.     PAST MEDICAL HISTORY   has a past medical history of Bowel habit changes, Cataract, Cold (2018), Diabetes (HCC) (2018), Environmental and seasonal allergies, Heart burn, Heart valve disease, Hiatus hernia syndrome, High cholesterol, History of BPH, Indigestion, Skin cancer, Snoring, and TIA (transient ischemic attack).    SURGICAL HISTORY   has a past surgical history that includes other surgical procedure (); ectropian repair (Bilateral, 2019); and upper gi endoscopy,remov f.b. (10/27/2019).    FAMILY HISTORY  History reviewed. No pertinent family history.    SOCIAL HISTORY  Social History     Tobacco Use    Smoking status: Former     Current packs/day: 0.00     Average packs/day: 1 pack/day for 6.0 years (6.0 ttl pk-yrs)     Types: Cigarettes     Start date: 1960     Quit date: 1966     Years since quittin.8    Smokeless tobacco: Never   Substance and Sexual Activity    Alcohol use: Yes    Drug use: No    Sexual activity: Not on file       CURRENT MEDICATIONS  Home Medications    **Home medications have not yet been reviewed for this encounter**    "      ALLERGIES  Allergies   Allergen Reactions    Other Food Cough     Reports multiple food related allergies.       PHYSICAL EXAM  VITAL SIGNS: BP (!) 141/88   Pulse 74   Temp 36.6 °C (97.8 °F) (Temporal)   Resp 18   Ht 1.753 m (5' 9\")   Wt 59.2 kg (130 lb 8.2 oz)   SpO2 97%   BMI 19.27 kg/m²    Constitutional: Awake and alert. Nontoxic  HENT:  Grossly normal  Eyes: Grossly normal  Neck: Normal range of motion  Cardiovascular: Normal heart rate   Thorax & Lungs: No respiratory distress  Abdomen: Nontender  Skin: Approximately 10 cm wound to the chest wall.  Sutures remain in place.  Wound is hemostatic.  There is no surrounding erythema, edema or warmth.  No  fluctuance or purulent drainage.  Extremities: Well perfused  Psychiatric: Affect normal      DIAGNOSTIC STUDIES / PROCEDURES    Procedure - Laceration closure  Location: Chest Wall    Patient was positioned appropriately, Wound cleansed with alcohol. Dermabond was placed with good approximation. Procedure tolerated without complications.     COURSE & MEDICAL DECISION MAKING    ED Observation Status? No; Patient does not meet criteria for ED Observation.     INITIAL ASSESSMENT, COURSE AND PLAN  Care Narrative: This is a 78-year-old on Eliquis who presents with bleeding wound to chest wall. Patient had Mohs surgery at the site two days ago.  Patient arrives hemodynamically normal.  Surgicel was placed in triage and by my assessment the wound is fortunately hemostatic.  There are sutures that remain in place.  His nurse was able to show me the specific site where there had been bleeding initially and therefore I applied Dermabond to help ensure there would be no repeat bleeding at the site.  A clean dressing was applied.  Patient observed had no recurrence of bleeding.  Patient hemodynamically normal asymptomatic and no signs or symptoms to suggest anemia and I do not see an indication for blood work.  Wound is well-healing without signs of infection.  " Patient states that he is already on prophylactic antibiotics prescribed by his dermatologist.  He continues to look well, his significant other feels comfortable with wound care and he was discharged in good condition.    DISPOSITION AND DISCUSSIONS  I have discussed management of the patient with the following physicians and LUIS E's:  None    Discussion of management with other QHP or appropriate source(s): None     Escalation of care considered, and ultimately not performed:blood analysis, he has no dizziness, lightheadedness or syncope and is hemodynamically normal and doubt anemia    Barriers to care at this time, including but not limited to:  None .     Decision tools and prescription drugs considered including, but not limited to:  None .    FINAL DIAGNOSIS  1. Bleeding from wound Acute          Electronically signed by: Trinity Denis M.D., 11/3/2023 8:19 PM

## 2023-11-04 NOTE — ED TRIAGE NOTES
"Chief Complaint   Patient presents with    Bleeding/Bruising     Left Chest   Cancer removal on Wednesday, started actively bleeding while lying in bed  Currently taking Eliquis       /79   Pulse 80   Temp 36.6 °C (97.8 °F) (Temporal)   Resp 17   Ht 1.753 m (5' 9\")   Wt 59.2 kg (130 lb 8.2 oz)   SpO2 95%   BMI 19.27 kg/m²     Pt to ED via WC w/ visitor.  Pt had cancer removed from Left Chest Wall on Wednesday, started bleeding PTA to resting.  Wound cleaned and dressed by second RN.  Bleeding controlled at this time.    Reviewed Triage process w/ pt, encouraged to notify RN for any changes in condition or further concerns.    Thanked pt for understanding.    Pt to lobby.    "

## 2023-11-04 NOTE — DISCHARGE INSTRUCTIONS
Please return if any recurrent bleeding.  Please do not get the wound wet for the next 24 hrs.  After that please change the dressing daily.

## 2023-11-06 ENCOUNTER — APPOINTMENT (RX ONLY)
Dept: URBAN - METROPOLITAN AREA CLINIC 36 | Facility: CLINIC | Age: 78
Setting detail: DERMATOLOGY
End: 2023-11-06

## 2023-11-06 DIAGNOSIS — Z48.817 ENCOUNTER FOR SURGICAL AFTERCARE FOLLOWING SURGERY ON THE SKIN AND SUBCUTANEOUS TISSUE: ICD-10-CM

## 2023-11-06 PROCEDURE — ? POST-OP WOUND CHECK

## 2023-11-06 ASSESSMENT — LOCATION DETAILED DESCRIPTION DERM: LOCATION DETAILED: LEFT MEDIAL SUPERIOR CHEST

## 2023-11-06 ASSESSMENT — LOCATION SIMPLE DESCRIPTION DERM: LOCATION SIMPLE: CHEST

## 2023-11-06 ASSESSMENT — LOCATION ZONE DERM: LOCATION ZONE: TRUNK

## 2023-11-06 NOTE — PROCEDURE: POST-OP WOUND CHECK
Detail Level: Simple
Add 73502 Cpt? (Important Note: In 2017 The Use Of 66788 Is Being Tracked By Cms To Determine Future Global Period Reimbursement For Global Periods): no
Wound Evaluated By: Citlaly Arredondo MD

## 2023-11-15 ENCOUNTER — APPOINTMENT (RX ONLY)
Dept: URBAN - METROPOLITAN AREA CLINIC 36 | Facility: CLINIC | Age: 78
Setting detail: DERMATOLOGY
End: 2023-11-15

## 2023-11-15 DIAGNOSIS — Z48.02 ENCOUNTER FOR REMOVAL OF SUTURES: ICD-10-CM

## 2023-11-15 PROCEDURE — ? SUTURE REMOVAL (GLOBAL PERIOD)

## 2023-11-15 ASSESSMENT — LOCATION ZONE DERM: LOCATION ZONE: TRUNK

## 2023-11-15 ASSESSMENT — LOCATION SIMPLE DESCRIPTION DERM: LOCATION SIMPLE: CHEST

## 2023-11-15 ASSESSMENT — LOCATION DETAILED DESCRIPTION DERM: LOCATION DETAILED: LEFT MEDIAL SUPERIOR CHEST

## 2023-11-15 NOTE — PROCEDURE: SUTURE REMOVAL (GLOBAL PERIOD)
Detail Level: Detailed
Add 94264 Cpt? (Important Note: In 2017 The Use Of 54988 Is Being Tracked By Cms To Determine Future Global Period Reimbursement For Global Periods): no

## 2023-11-20 ENCOUNTER — APPOINTMENT (RX ONLY)
Dept: URBAN - METROPOLITAN AREA CLINIC 36 | Facility: CLINIC | Age: 78
Setting detail: DERMATOLOGY
End: 2023-11-20

## 2023-11-20 DIAGNOSIS — Z48.817 ENCOUNTER FOR SURGICAL AFTERCARE FOLLOWING SURGERY ON THE SKIN AND SUBCUTANEOUS TISSUE: ICD-10-CM

## 2023-11-20 PROCEDURE — ? POST-OP WOUND CHECK

## 2023-11-20 ASSESSMENT — LOCATION ZONE DERM: LOCATION ZONE: TRUNK

## 2023-11-20 ASSESSMENT — LOCATION SIMPLE DESCRIPTION DERM: LOCATION SIMPLE: CHEST

## 2023-11-20 ASSESSMENT — LOCATION DETAILED DESCRIPTION DERM: LOCATION DETAILED: LEFT LATERAL SUPERIOR CHEST

## 2023-11-20 NOTE — PROCEDURE: POST-OP WOUND CHECK
Detail Level: Simple
Add 08566 Cpt? (Important Note: In 2017 The Use Of 14492 Is Being Tracked By Cms To Determine Future Global Period Reimbursement For Global Periods): no
Wound Evaluated By: Citlaly Arredondo MD

## 2023-11-22 ENCOUNTER — APPOINTMENT (RX ONLY)
Dept: URBAN - METROPOLITAN AREA CLINIC 20 | Facility: CLINIC | Age: 78
Setting detail: DERMATOLOGY
End: 2023-11-22

## 2023-11-22 ENCOUNTER — RX ONLY (OUTPATIENT)
Age: 78
Setting detail: RX ONLY
End: 2023-11-22

## 2023-11-22 DIAGNOSIS — T81.89 OTHER COMPLICATIONS OF PROCEDURES, NOT ELSEWHERE CLASSIFIED: ICD-10-CM

## 2023-11-22 PROBLEM — T81.89XA OTHER COMPLICATIONS OF PROCEDURES, NOT ELSEWHERE CLASSIFIED, INITIAL ENCOUNTER: Status: ACTIVE | Noted: 2023-11-22

## 2023-11-22 PROCEDURE — ? ABLATIVE LASER TREATMENT

## 2023-11-22 PROCEDURE — 17999 UNLISTD PX SKN MUC MEMB SUBQ: CPT

## 2023-11-22 PROCEDURE — ? PRESCRIPTION

## 2023-11-22 PROCEDURE — 15271 SKIN SUB GRAFT TRNK/ARM/LEG: CPT

## 2023-11-22 PROCEDURE — A6021 COLLAGEN DRESSING <=16 SQ IN: HCPCS

## 2023-11-22 PROCEDURE — ? ADDITIONAL NOTES

## 2023-11-22 PROCEDURE — ? PURACOL APPLICATION

## 2023-11-22 RX ORDER — DOXYCYCLINE 100 MG/1
TABLET, FILM COATED ORAL BID
Qty: 20 | Refills: 0 | Status: ERX | COMMUNITY
Start: 2023-11-22

## 2023-11-22 RX ORDER — DOXYCYCLINE 100 MG/1
1 TABLET TABLET, FILM COATED ORAL TWICE DAILY
Qty: 20 | Refills: 0 | Status: ERX

## 2023-11-22 RX ADMIN — DOXYCYCLINE: 100 TABLET, FILM COATED ORAL at 00:00

## 2023-11-22 ASSESSMENT — LOCATION DETAILED DESCRIPTION DERM
LOCATION DETAILED: LEFT LATERAL SUPERIOR CHEST
LOCATION DETAILED: LEFT LATERAL SUPERIOR CHEST

## 2023-11-22 ASSESSMENT — LOCATION SIMPLE DESCRIPTION DERM
LOCATION SIMPLE: CHEST
LOCATION SIMPLE: CHEST

## 2023-11-22 ASSESSMENT — LOCATION ZONE DERM
LOCATION ZONE: TRUNK
LOCATION ZONE: TRUNK

## 2023-11-22 NOTE — PROCEDURE: PURACOL APPLICATION
Anesthesia Volume In Cc: 0
Repair Type: Graft
Graft Type: Skin Substitute
Skin Substitute: Puracol Plus
Puracol Size Used: 2 x 2.25 inches
Primary Defect Length (In Cm): 2.5
Primary Defect Width (In Cm): 1.5
Include The Following Details In The Note (If No Details Will Only Be Reflected In The Surgical Fax): No
Detail Level: Detailed
Estimated Blood Loss (Cc): minimal
Puracol Skin Substitute Text: Given the location of the defect, shape of the defect and the proximity to free margins a skin substitute graft was deemed most appropriate.  The puracol was trimmed to fit the size of the defect. The graft was then placed in the primary defect and oriented appropriately.
Consent: The rationale for Repairs was explained to the patient and consent was obtained. The risks, benefits and alternatives to therapy were discussed in detail. Specifically, the risks of infection, scarring, bleeding, prolonged wound healing, incomplete removal, allergy to anesthesia, nerve injury and recurrence were addressed.
Post-Care Instructions: I reviewed with the patient in detail post-care instructions

## 2023-11-22 NOTE — PROCEDURE: ADDITIONAL NOTES
Render Risk Assessment In Note?: no
Additional Notes: Pt wound was dressed with puracol, adaptic, steristrips secured with mastisol ,telfa, and hypafix. Wound looking ragged and potentially infected. Doxy started.
Detail Level: Simple

## 2023-11-29 ENCOUNTER — APPOINTMENT (RX ONLY)
Dept: URBAN - METROPOLITAN AREA CLINIC 20 | Facility: CLINIC | Age: 78
Setting detail: DERMATOLOGY
End: 2023-11-29

## 2023-11-29 DIAGNOSIS — T81.89 OTHER COMPLICATIONS OF PROCEDURES, NOT ELSEWHERE CLASSIFIED: ICD-10-CM

## 2023-11-29 PROBLEM — T81.89XA OTHER COMPLICATIONS OF PROCEDURES, NOT ELSEWHERE CLASSIFIED, INITIAL ENCOUNTER: Status: ACTIVE | Noted: 2023-11-29

## 2023-11-29 PROCEDURE — ? ADDITIONAL NOTES

## 2023-11-29 PROCEDURE — 17999 UNLISTD PX SKN MUC MEMB SUBQ: CPT

## 2023-11-29 PROCEDURE — ? ABLATIVE LASER TREATMENT

## 2023-11-29 PROCEDURE — ? EPIFIX APPLICATION

## 2023-11-29 PROCEDURE — 15271 SKIN SUB GRAFT TRNK/ARM/LEG: CPT

## 2023-11-29 ASSESSMENT — LOCATION DETAILED DESCRIPTION DERM
LOCATION DETAILED: LEFT LATERAL SUPERIOR CHEST
LOCATION DETAILED: LEFT LATERAL SUPERIOR CHEST

## 2023-11-29 ASSESSMENT — LOCATION SIMPLE DESCRIPTION DERM
LOCATION SIMPLE: CHEST
LOCATION SIMPLE: CHEST

## 2023-11-29 ASSESSMENT — LOCATION ZONE DERM
LOCATION ZONE: TRUNK
LOCATION ZONE: TRUNK

## 2023-11-29 NOTE — PROCEDURE: EPIFIX APPLICATION
Anesthesia Volume In Cc: 0
Repair Type: Graft
Graft Type: Skin Substitute
Skin Substitute: EpiFix
Epifix Size Used: 24mm disk
Skin Substitute Units (Will Override Primary Defect Units If Greater Than 0): 5
Units Wasted: 1
Skin Substitute: EpiFix Micronized
Primary Defect Length (In Cm): 2
Primary Defect Width (In Cm): 0.7
Include The Following Details In The Note (If No Details Will Only Be Reflected In The Surgical Fax): No
Detail Level: Detailed
Estimated Blood Loss (Cc): minimal
Wound Care: Petrolatum
Dressing: dry sterile dressing
Epifix Text: Given the location of the defect, shape of the defect and the proximity to free margins a skin substitute graft was deemed most appropriate.  The EpiFix was trimmed to fit the size of the defect. The graft was then placed in the primary defect and oriented appropriately.
Skin Substitute Paste Text: The defect edges were debeveled with a 4 mm curette. Given the location of the defect, shape of the defect and the proximity to free margins a skin substitute micronized graft was deemed most appropriate.  The entire vial contents were admixed with 0.5ccs of sterile saline, formed into a paste and then evenly spread over the entire wound bed.
Skin Substitute Injection Text: The defect edges were debeveled with a 4 mm curette. Given the location of the defect, shape of the defect and the proximity to free margins a skin substitute micronized graft was deemed most appropriate.  The entire vial contents were admixed with 3.0ccs of sterile saline and then injected subcutaneously throughout the entire wound bed.
Consent: The rationale for repairs was explained to the patient and consent was obtained. The risks, benefits and alternatives to therapy were discussed in detail. Specifically, the risks of infection, scarring, bleeding, prolonged wound healing, incomplete removal, allergy to anesthesia, nerve injury and recurrence were addressed. Prior to the procedure, the treatment site was clearly identified and confirmed by the patient. All components of Universal Protocol/PAUSE Rule completed.
Post-Care Instructions: I reviewed with the patient in detail post-care instructions. Should the patient develop any fevers, chills, bleeding, severe pain patient will contact the office immediately.
Show Tissue Id?: Yes

## 2023-11-29 NOTE — PROCEDURE: ADDITIONAL NOTES
Additional Notes: Pt wound was dressed with epifix, adaptic, steri strips, telfa, and hypafix.
Detail Level: Simple
Render Risk Assessment In Note?: no

## 2023-12-06 ENCOUNTER — APPOINTMENT (RX ONLY)
Dept: URBAN - METROPOLITAN AREA CLINIC 20 | Facility: CLINIC | Age: 78
Setting detail: DERMATOLOGY
End: 2023-12-06

## 2023-12-06 DIAGNOSIS — T81.89 OTHER COMPLICATIONS OF PROCEDURES, NOT ELSEWHERE CLASSIFIED: ICD-10-CM | Status: WELL CONTROLLED

## 2023-12-06 PROBLEM — T81.89XA OTHER COMPLICATIONS OF PROCEDURES, NOT ELSEWHERE CLASSIFIED, INITIAL ENCOUNTER: Status: ACTIVE | Noted: 2023-12-06

## 2023-12-06 PROCEDURE — ? COUNSELING

## 2023-12-06 PROCEDURE — 99212 OFFICE O/P EST SF 10 MIN: CPT

## 2023-12-06 ASSESSMENT — LOCATION ZONE DERM: LOCATION ZONE: TRUNK

## 2023-12-06 ASSESSMENT — LOCATION DETAILED DESCRIPTION DERM: LOCATION DETAILED: LEFT LATERAL SUPERIOR CHEST

## 2023-12-06 ASSESSMENT — LOCATION SIMPLE DESCRIPTION DERM: LOCATION SIMPLE: CHEST

## 2023-12-06 NOTE — PROCEDURE: COUNSELING
Called mom and scheduled the patients to be seen  
Mom called and states [place], mom states that she is worried because they didn't check the patients blood at all and he has been lethargic, he has abdominal pain. Mom said the patient did have a tick on his stomach but she doesn't think these symptoms are related. She states that there was a case of mono going around the school and she is concerned. Mom said the patient has been having these symptoms since Sunday night.  
Patient Specific Counseling (Will Not Stick From Patient To Patient): Wound is macerated and pt is instructed to keep wound covered with bandaid. Call and set up apt if wound gets red, hot, or swollen.
Detail Level: Detailed

## 2024-01-10 ENCOUNTER — APPOINTMENT (RX ONLY)
Dept: URBAN - METROPOLITAN AREA CLINIC 6 | Facility: CLINIC | Age: 79
Setting detail: DERMATOLOGY
End: 2024-01-10

## 2024-01-10 DIAGNOSIS — L81.4 OTHER MELANIN HYPERPIGMENTATION: ICD-10-CM

## 2024-01-10 DIAGNOSIS — Z71.89 OTHER SPECIFIED COUNSELING: ICD-10-CM

## 2024-01-10 DIAGNOSIS — D18.0 HEMANGIOMA: ICD-10-CM

## 2024-01-10 DIAGNOSIS — L57.0 ACTINIC KERATOSIS: ICD-10-CM

## 2024-01-10 DIAGNOSIS — D22 MELANOCYTIC NEVI: ICD-10-CM

## 2024-01-10 DIAGNOSIS — Z85.828 PERSONAL HISTORY OF OTHER MALIGNANT NEOPLASM OF SKIN: ICD-10-CM

## 2024-01-10 DIAGNOSIS — L82.1 OTHER SEBORRHEIC KERATOSIS: ICD-10-CM

## 2024-01-10 DIAGNOSIS — L57.8 OTHER SKIN CHANGES DUE TO CHRONIC EXPOSURE TO NONIONIZING RADIATION: ICD-10-CM

## 2024-01-10 DIAGNOSIS — L72.8 OTHER FOLLICULAR CYSTS OF THE SKIN AND SUBCUTANEOUS TISSUE: ICD-10-CM

## 2024-01-10 PROBLEM — D48.5 NEOPLASM OF UNCERTAIN BEHAVIOR OF SKIN: Status: ACTIVE | Noted: 2024-01-10

## 2024-01-10 PROBLEM — D18.01 HEMANGIOMA OF SKIN AND SUBCUTANEOUS TISSUE: Status: ACTIVE | Noted: 2024-01-10

## 2024-01-10 PROBLEM — D22.5 MELANOCYTIC NEVI OF TRUNK: Status: ACTIVE | Noted: 2024-01-10

## 2024-01-10 PROCEDURE — ? SUNSCREEN RECOMMENDATIONS

## 2024-01-10 PROCEDURE — 17000 DESTRUCT PREMALG LESION: CPT | Mod: 59

## 2024-01-10 PROCEDURE — ? COUNSELING

## 2024-01-10 PROCEDURE — ? DEFER

## 2024-01-10 PROCEDURE — 99214 OFFICE O/P EST MOD 30 MIN: CPT | Mod: 25

## 2024-01-10 PROCEDURE — ? LIQUID NITROGEN

## 2024-01-10 PROCEDURE — 11103 TANGNTL BX SKIN EA SEP/ADDL: CPT

## 2024-01-10 PROCEDURE — ? BIOPSY BY SHAVE METHOD

## 2024-01-10 PROCEDURE — 11102 TANGNTL BX SKIN SINGLE LES: CPT

## 2024-01-10 PROCEDURE — ? SUNSCREEN TREATMENT REGIMEN

## 2024-01-10 PROCEDURE — ? ADDITIONAL NOTES

## 2024-01-10 PROCEDURE — 17003 DESTRUCT PREMALG LES 2-14: CPT

## 2024-01-10 PROCEDURE — ? PRESCRIPTION

## 2024-01-10 RX ORDER — FLUOROURACIL 5 MG/G
CREAM TOPICAL BID
Qty: 40 | Refills: 3 | Status: ERX | COMMUNITY
Start: 2024-01-10

## 2024-01-10 RX ADMIN — FLUOROURACIL: 5 CREAM TOPICAL at 00:00

## 2024-01-10 ASSESSMENT — LOCATION SIMPLE DESCRIPTION DERM
LOCATION SIMPLE: LEFT UPPER ARM
LOCATION SIMPLE: RIGHT FOREHEAD
LOCATION SIMPLE: RIGHT TEMPLE
LOCATION SIMPLE: ABDOMEN
LOCATION SIMPLE: RIGHT HAND
LOCATION SIMPLE: SCALP
LOCATION SIMPLE: LEFT CHEEK
LOCATION SIMPLE: LEFT ZYGOMA
LOCATION SIMPLE: LEFT HAND
LOCATION SIMPLE: CHEST
LOCATION SIMPLE: ANTERIOR SCALP
LOCATION SIMPLE: GENITALIA

## 2024-01-10 ASSESSMENT — LOCATION ZONE DERM
LOCATION ZONE: ARM
LOCATION ZONE: GENITALIA
LOCATION ZONE: TRUNK
LOCATION ZONE: SCALP
LOCATION ZONE: HAND
LOCATION ZONE: FACE

## 2024-01-10 ASSESSMENT — LOCATION DETAILED DESCRIPTION DERM
LOCATION DETAILED: STERNUM
LOCATION DETAILED: EPIGASTRIC SKIN
LOCATION DETAILED: LEFT MEDIAL SUPERIOR CHEST
LOCATION DETAILED: PERIUMBILICAL SKIN
LOCATION DETAILED: LEFT MEDIAL INFERIOR CHEST
LOCATION DETAILED: RIGHT CENTRAL TEMPLE
LOCATION DETAILED: GENITALIA
LOCATION DETAILED: LEFT SUPERIOR CENTRAL MALAR CHEEK
LOCATION DETAILED: LEFT ULNAR DORSAL HAND
LOCATION DETAILED: LEFT SUPERIOR PARIETAL SCALP
LOCATION DETAILED: MID-FRONTAL SCALP
LOCATION DETAILED: LEFT DISTAL POSTERIOR UPPER ARM
LOCATION DETAILED: 2ND WEB SPACE RIGHT HAND
LOCATION DETAILED: LEFT CENTRAL ZYGOMA
LOCATION DETAILED: LEFT LATERAL MALAR CHEEK
LOCATION DETAILED: RIGHT SUPERIOR FOREHEAD

## 2024-01-10 NOTE — PROCEDURE: LIQUID NITROGEN
Number Of Freeze-Thaw Cycles: 2 freeze-thaw cycles
Show Aperture Variable?: Yes
Consent: The patient's consent was obtained including but not limited to risks of crusting, scabbing, blistering, scarring, darker or lighter pigmentary change, recurrence, incomplete removal and infection.
Render Post-Care Instructions In Note?: no
Detail Level: Detailed
Post-Care Instructions: I reviewed with the patient in detail post-care instructions. Patient is to wear sunprotection, and avoid picking at any of the treated lesions. Pt may apply Vaseline to crusted or scabbing areas.
Duration Of Freeze Thaw-Cycle (Seconds): 10

## 2024-01-10 NOTE — PROCEDURE: DEFER
X Size Of Lesion In Cm (Optional): 0
Size Of Lesion In Cm (Optional): 2
Introduction Text (Please End With A Colon): Declines treatment at this time
Detail Level: Detailed

## 2024-02-23 ENCOUNTER — APPOINTMENT (RX ONLY)
Dept: URBAN - METROPOLITAN AREA CLINIC 6 | Facility: CLINIC | Age: 79
Setting detail: DERMATOLOGY
End: 2024-02-23

## 2024-02-23 DIAGNOSIS — L57.0 ACTINIC KERATOSIS: ICD-10-CM

## 2024-02-23 DIAGNOSIS — L72.8 OTHER FOLLICULAR CYSTS OF THE SKIN AND SUBCUTANEOUS TISSUE: ICD-10-CM

## 2024-02-23 PROBLEM — D04.5 CARCINOMA IN SITU OF SKIN OF TRUNK: Status: ACTIVE | Noted: 2024-02-23

## 2024-02-23 PROBLEM — D48.5 NEOPLASM OF UNCERTAIN BEHAVIOR OF SKIN: Status: ACTIVE | Noted: 2024-02-23

## 2024-02-23 PROCEDURE — 17261 DSTRJ MAL LES T/A/L .6-1.0CM: CPT

## 2024-02-23 PROCEDURE — ? ADDITIONAL NOTES

## 2024-02-23 PROCEDURE — ? EXCISION

## 2024-02-23 PROCEDURE — ? COUNSELING

## 2024-02-23 PROCEDURE — 11423 EXC H-F-NK-SP B9+MARG 2.1-3: CPT

## 2024-02-23 PROCEDURE — 12041 INTMD RPR N-HF/GENIT 2.5CM/<: CPT | Mod: 59

## 2024-02-23 PROCEDURE — 99212 OFFICE O/P EST SF 10 MIN: CPT | Mod: 25

## 2024-02-23 PROCEDURE — ? CURETTAGE AND DESTRUCTION

## 2024-02-23 ASSESSMENT — LOCATION SIMPLE DESCRIPTION DERM
LOCATION SIMPLE: GENITALIA
LOCATION SIMPLE: CHEST

## 2024-02-23 ASSESSMENT — LOCATION ZONE DERM
LOCATION ZONE: TRUNK
LOCATION ZONE: GENITALIA

## 2024-02-23 ASSESSMENT — LOCATION DETAILED DESCRIPTION DERM
LOCATION DETAILED: RIGHT MEDIAL SUPERIOR CHEST
LOCATION DETAILED: GENITALIA

## 2024-02-23 NOTE — PROCEDURE: EXCISION

## 2024-02-23 NOTE — PROCEDURE: ADDITIONAL NOTES
Render Risk Assessment In Note?: no
Detail Level: Simple
Additional Notes: [id number] Bx proven HAK. Treated today w/ destruction (curettage & hyfrecator). Will continue to monitor.

## 2024-02-23 NOTE — PROCEDURE: CURETTAGE AND DESTRUCTION
Detail Level: Detailed
Biopsy Photograph Reviewed: Yes
Accession # (Optional): [id number]
Number Of Curettages: 3
Size Of Lesion In Cm: 0.7
Size Of Lesion After Curettage: 0.9
Add Intralesional Injection: No
Concentration (Mg/Ml Or Millions Of Plaque Forming Units/Cc): 0.01
Total Volume (Ccs): 1
Anesthesia Type: 1% lidocaine with epinephrine
Cautery Type: electrodesiccation
What Was Performed First?: Curettage
Final Size Statement: The size of the lesion after curettage was
Additional Information: (Optional): The wound was cleaned, and a pressure dressing was applied.  The patient received detailed post-op instructions.
Consent was obtained from the patient. The risks, benefits and alternatives to therapy were discussed in detail. Specifically, the risks of infection, scarring, bleeding, prolonged wound healing, nerve injury, incomplete removal, allergy to anesthesia and recurrence were addressed. Alternatives to ED&C, such as: surgical removal and XRT were also discussed.  Prior to the procedure, the treatment site was clearly identified and confirmed by the patient. All components of Universal Protocol/PAUSE Rule completed.
Post-Care Instructions: I reviewed with the patient in detail post-care instructions. Patient is to keep the area dry for 48 hours, and not to engage in any swimming until the area is healed. Should the patient develop any fevers, chills, bleeding, severe pain patient will contact the office immediately.
Bill As A Line Item Or As Units: Line Item

## 2024-05-13 ENCOUNTER — APPOINTMENT (RX ONLY)
Dept: URBAN - METROPOLITAN AREA CLINIC 6 | Facility: CLINIC | Age: 79
Setting detail: DERMATOLOGY
End: 2024-05-13

## 2024-05-13 DIAGNOSIS — L81.4 OTHER MELANIN HYPERPIGMENTATION: ICD-10-CM

## 2024-05-13 DIAGNOSIS — Z71.89 OTHER SPECIFIED COUNSELING: ICD-10-CM

## 2024-05-13 DIAGNOSIS — Z85.828 PERSONAL HISTORY OF OTHER MALIGNANT NEOPLASM OF SKIN: ICD-10-CM

## 2024-05-13 DIAGNOSIS — D485 NEOPLASM OF UNCERTAIN BEHAVIOR OF SKIN: ICD-10-CM

## 2024-05-13 DIAGNOSIS — L57.0 ACTINIC KERATOSIS: ICD-10-CM

## 2024-05-13 DIAGNOSIS — D18.0 HEMANGIOMA: ICD-10-CM

## 2024-05-13 DIAGNOSIS — D22 MELANOCYTIC NEVI: ICD-10-CM

## 2024-05-13 DIAGNOSIS — L82.1 OTHER SEBORRHEIC KERATOSIS: ICD-10-CM

## 2024-05-13 PROBLEM — D22.5 MELANOCYTIC NEVI OF TRUNK: Status: ACTIVE | Noted: 2024-05-13

## 2024-05-13 PROBLEM — D18.01 HEMANGIOMA OF SKIN AND SUBCUTANEOUS TISSUE: Status: ACTIVE | Noted: 2024-05-13

## 2024-05-13 PROBLEM — D48.5 NEOPLASM OF UNCERTAIN BEHAVIOR OF SKIN: Status: ACTIVE | Noted: 2024-05-13

## 2024-05-13 PROCEDURE — ? SUNSCREEN RECOMMENDATIONS

## 2024-05-13 PROCEDURE — ? COUNSELING

## 2024-05-13 PROCEDURE — ? LIQUID NITROGEN

## 2024-05-13 PROCEDURE — 17003 DESTRUCT PREMALG LES 2-14: CPT

## 2024-05-13 PROCEDURE — 17000 DESTRUCT PREMALG LESION: CPT | Mod: 59

## 2024-05-13 PROCEDURE — 11102 TANGNTL BX SKIN SINGLE LES: CPT

## 2024-05-13 PROCEDURE — 99213 OFFICE O/P EST LOW 20 MIN: CPT | Mod: 25

## 2024-05-13 PROCEDURE — ? SUNSCREEN TREATMENT REGIMEN

## 2024-05-13 PROCEDURE — ? BIOPSY BY SHAVE METHOD

## 2024-05-13 ASSESSMENT — LOCATION DETAILED DESCRIPTION DERM
LOCATION DETAILED: LEFT MEDIAL INFERIOR CHEST
LOCATION DETAILED: LEFT MEDIAL FOREHEAD
LOCATION DETAILED: RIGHT MEDIAL INFERIOR CHEST
LOCATION DETAILED: EPIGASTRIC SKIN
LOCATION DETAILED: LEFT SUPERIOR PARIETAL SCALP
LOCATION DETAILED: RIGHT MEDIAL UPPER BACK
LOCATION DETAILED: LEFT DISTAL POSTERIOR UPPER ARM
LOCATION DETAILED: LEFT MEDIAL SUPERIOR CHEST
LOCATION DETAILED: PERIUMBILICAL SKIN
LOCATION DETAILED: LEFT SUPERIOR FOREHEAD
LOCATION DETAILED: RIGHT SUPERIOR MEDIAL MALAR CHEEK
LOCATION DETAILED: 2ND WEB SPACE RIGHT HAND
LOCATION DETAILED: STERNUM
LOCATION DETAILED: LEFT SUPERIOR CRUS OF ANTIHELIX
LOCATION DETAILED: LEFT SUPERIOR CENTRAL MALAR CHEEK
LOCATION DETAILED: LEFT SUPERIOR OCCIPITAL SCALP
LOCATION DETAILED: MID-OCCIPITAL SCALP
LOCATION DETAILED: LEFT MEDIAL TEMPLE
LOCATION DETAILED: LEFT ULNAR DORSAL HAND
LOCATION DETAILED: RIGHT MEDIAL SUPERIOR CHEST

## 2024-05-13 ASSESSMENT — LOCATION SIMPLE DESCRIPTION DERM
LOCATION SIMPLE: SCALP
LOCATION SIMPLE: RIGHT HAND
LOCATION SIMPLE: POSTERIOR SCALP
LOCATION SIMPLE: LEFT FOREHEAD
LOCATION SIMPLE: LEFT CHEEK
LOCATION SIMPLE: CHEST
LOCATION SIMPLE: LEFT HAND
LOCATION SIMPLE: LEFT UPPER ARM
LOCATION SIMPLE: LEFT EAR
LOCATION SIMPLE: RIGHT UPPER BACK
LOCATION SIMPLE: ABDOMEN
LOCATION SIMPLE: RIGHT CHEEK
LOCATION SIMPLE: LEFT TEMPLE

## 2024-05-13 ASSESSMENT — LOCATION ZONE DERM
LOCATION ZONE: HAND
LOCATION ZONE: FACE
LOCATION ZONE: TRUNK
LOCATION ZONE: ARM
LOCATION ZONE: EAR
LOCATION ZONE: SCALP

## 2024-10-14 ENCOUNTER — APPOINTMENT (RX ONLY)
Dept: URBAN - METROPOLITAN AREA CLINIC 6 | Facility: CLINIC | Age: 79
Setting detail: DERMATOLOGY
End: 2024-10-14

## 2024-10-14 DIAGNOSIS — Z71.89 OTHER SPECIFIED COUNSELING: ICD-10-CM

## 2024-10-14 DIAGNOSIS — Z85.828 PERSONAL HISTORY OF OTHER MALIGNANT NEOPLASM OF SKIN: ICD-10-CM

## 2024-10-14 DIAGNOSIS — L84 CORNS AND CALLOSITIES: ICD-10-CM

## 2024-10-14 DIAGNOSIS — D22 MELANOCYTIC NEVI: ICD-10-CM

## 2024-10-14 DIAGNOSIS — L82.1 OTHER SEBORRHEIC KERATOSIS: ICD-10-CM

## 2024-10-14 DIAGNOSIS — L81.4 OTHER MELANIN HYPERPIGMENTATION: ICD-10-CM

## 2024-10-14 DIAGNOSIS — D18.0 HEMANGIOMA: ICD-10-CM

## 2024-10-14 DIAGNOSIS — L57.0 ACTINIC KERATOSIS: ICD-10-CM

## 2024-10-14 PROBLEM — D22.61 MELANOCYTIC NEVI OF RIGHT UPPER LIMB, INCLUDING SHOULDER: Status: ACTIVE | Noted: 2024-10-14

## 2024-10-14 PROBLEM — D22.72 MELANOCYTIC NEVI OF LEFT LOWER LIMB, INCLUDING HIP: Status: ACTIVE | Noted: 2024-10-14

## 2024-10-14 PROBLEM — D48.5 NEOPLASM OF UNCERTAIN BEHAVIOR OF SKIN: Status: ACTIVE | Noted: 2024-10-14

## 2024-10-14 PROBLEM — D22.5 MELANOCYTIC NEVI OF TRUNK: Status: ACTIVE | Noted: 2024-10-14

## 2024-10-14 PROBLEM — D18.01 HEMANGIOMA OF SKIN AND SUBCUTANEOUS TISSUE: Status: ACTIVE | Noted: 2024-10-14

## 2024-10-14 PROBLEM — D22.62 MELANOCYTIC NEVI OF LEFT UPPER LIMB, INCLUDING SHOULDER: Status: ACTIVE | Noted: 2024-10-14

## 2024-10-14 PROBLEM — D22.71 MELANOCYTIC NEVI OF RIGHT LOWER LIMB, INCLUDING HIP: Status: ACTIVE | Noted: 2024-10-14

## 2024-10-14 PROCEDURE — 17110 DESTRUCTION B9 LES UP TO 14: CPT | Mod: 59

## 2024-10-14 PROCEDURE — 17004 DESTROY PREMAL LESIONS 15/>: CPT

## 2024-10-14 PROCEDURE — ? COUNSELING

## 2024-10-14 PROCEDURE — 99213 OFFICE O/P EST LOW 20 MIN: CPT | Mod: 25

## 2024-10-14 PROCEDURE — ? OTC TREATMENT REGIMEN

## 2024-10-14 PROCEDURE — ? LIQUID NITROGEN

## 2024-10-14 PROCEDURE — 11102 TANGNTL BX SKIN SINGLE LES: CPT | Mod: 59

## 2024-10-14 PROCEDURE — ? BIOPSY BY SHAVE METHOD

## 2024-10-14 ASSESSMENT — LOCATION SIMPLE DESCRIPTION DERM
LOCATION SIMPLE: LEFT UPPER BACK
LOCATION SIMPLE: LEFT PLANTAR SURFACE
LOCATION SIMPLE: LEFT UPPER ARM
LOCATION SIMPLE: POSTERIOR SCALP
LOCATION SIMPLE: LEFT OCCIPITAL SCALP
LOCATION SIMPLE: LEFT CALF
LOCATION SIMPLE: RIGHT CALF
LOCATION SIMPLE: NOSE
LOCATION SIMPLE: RIGHT FOREARM
LOCATION SIMPLE: RIGHT PLANTAR SURFACE
LOCATION SIMPLE: LEFT POSTERIOR THIGH
LOCATION SIMPLE: RIGHT OCCIPITAL SCALP
LOCATION SIMPLE: RIGHT UPPER ARM
LOCATION SIMPLE: ANTERIOR SCALP
LOCATION SIMPLE: LEFT CHEEK
LOCATION SIMPLE: RIGHT CHEEK
LOCATION SIMPLE: RIGHT LOWER BACK
LOCATION SIMPLE: RIGHT THIGH
LOCATION SIMPLE: CHEST
LOCATION SIMPLE: LEFT THIGH
LOCATION SIMPLE: RIGHT POSTERIOR THIGH
LOCATION SIMPLE: LEFT FOREARM
LOCATION SIMPLE: SCALP
LOCATION SIMPLE: RIGHT UPPER BACK
LOCATION SIMPLE: LEFT HAND

## 2024-10-14 ASSESSMENT — LOCATION DETAILED DESCRIPTION DERM
LOCATION DETAILED: LEFT INFERIOR UPPER BACK
LOCATION DETAILED: RIGHT DISTAL POSTERIOR THIGH
LOCATION DETAILED: RIGHT VENTRAL DISTAL FOREARM
LOCATION DETAILED: LEFT ANTERIOR DISTAL UPPER ARM
LOCATION DETAILED: LEFT PROXIMAL DORSAL FOREARM
LOCATION DETAILED: LEFT VENTRAL DISTAL FOREARM
LOCATION DETAILED: LEFT SUPERIOR PARIETAL SCALP
LOCATION DETAILED: RIGHT MID-UPPER BACK
LOCATION DETAILED: MID-FRONTAL SCALP
LOCATION DETAILED: RIGHT PROXIMAL DORSAL FOREARM
LOCATION DETAILED: RIGHT LATERAL PLANTAR MIDFOOT
LOCATION DETAILED: RIGHT MEDIAL PLANTAR MIDFOOT
LOCATION DETAILED: LEFT RADIAL DORSAL HAND
LOCATION DETAILED: LEFT LATERAL PLANTAR MIDFOOT
LOCATION DETAILED: LEFT DISTAL POSTERIOR THIGH
LOCATION DETAILED: LEFT SUPERIOR OCCIPITAL SCALP
LOCATION DETAILED: NASAL DORSUM
LOCATION DETAILED: LEFT MEDIAL SUPERIOR CHEST
LOCATION DETAILED: LEFT PLANTAR FOREFOOT OVERLYING 1ST METATARSAL
LOCATION DETAILED: RIGHT ANTERIOR PROXIMAL THIGH
LOCATION DETAILED: LEFT PLANTAR FOREFOOT OVERLYING 4TH METATARSAL
LOCATION DETAILED: STERNUM
LOCATION DETAILED: LEFT SUPERIOR CENTRAL MALAR CHEEK
LOCATION DETAILED: LEFT PROXIMAL CALF
LOCATION DETAILED: RIGHT SUPERIOR MEDIAL MALAR CHEEK
LOCATION DETAILED: RIGHT MEDIAL SUPERIOR CHEST
LOCATION DETAILED: RIGHT VENTRAL PROXIMAL FOREARM
LOCATION DETAILED: LEFT ANTERIOR PROXIMAL THIGH
LOCATION DETAILED: RIGHT ANTERIOR DISTAL UPPER ARM
LOCATION DETAILED: LEFT CENTRAL MALAR CHEEK
LOCATION DETAILED: RIGHT SUPERIOR CENTRAL MALAR CHEEK
LOCATION DETAILED: RIGHT SUPERIOR LATERAL MIDBACK
LOCATION DETAILED: RIGHT MEDIAL INFERIOR CHEST
LOCATION DETAILED: RIGHT PROXIMAL CALF
LOCATION DETAILED: RIGHT SUPERIOR OCCIPITAL SCALP

## 2024-10-14 ASSESSMENT — LOCATION ZONE DERM
LOCATION ZONE: HAND
LOCATION ZONE: NOSE
LOCATION ZONE: ARM
LOCATION ZONE: LEG
LOCATION ZONE: FEET
LOCATION ZONE: TRUNK
LOCATION ZONE: SCALP
LOCATION ZONE: FACE

## 2024-10-14 NOTE — PROCEDURE: OTC TREATMENT REGIMEN
Patient Specific Otc Recommendations (Will Not Stick From Patient To Patient): Urea 40% cream nightly
Detail Level: Zone

## 2024-10-14 NOTE — PROCEDURE: BIOPSY BY SHAVE METHOD
Detail Level: Detailed
Depth Of Biopsy: dermis
Was A Bandage Applied: Yes
Size Of Lesion In Cm: 1
Biopsy Type: H and E
Biopsy Method: Dermablade
Anesthesia Type: 0.5% lidocaine without epinephrine
Anesthesia Volume In Cc: 0.5
Additional Anesthesia Volume In Cc (Will Not Render If 0): 0
Hemostasis: Drysol
Wound Care: Petrolatum
Dressing: bandage
Destruction After The Procedure: No
Type Of Destruction Used: Curettage
Curettage Text: The wound bed was treated with curettage after the biopsy was performed.
Cryotherapy Text: The wound bed was treated with cryotherapy after the biopsy was performed.
Electrodesiccation Text: The wound bed was treated with electrodesiccation after the biopsy was performed.
Electrodesiccation And Curettage Text: The wound bed was treated with electrodesiccation and curettage after the biopsy was performed.
Silver Nitrate Text: The wound bed was treated with silver nitrate after the biopsy was performed.
Lab: [id number]
Lab Facility: 
Consent: Written consent was obtained and risks were reviewed including but not limited to scarring, infection, bleeding, scabbing, incomplete removal, nerve damage and allergy to anesthesia.
Post-Care Instructions: I reviewed with the patient in detail post-care instructions. Patient is to keep the biopsy site dry overnight, and then apply bacitracin twice daily until healed. Patient may apply hydrogen peroxide soaks to remove any crusting.
Notification Instructions: Patient will be notified of biopsy results. However, patient instructed to call the office if not contacted within 2 weeks.
Billing Type: Third-Party Bill
Information: Selecting Yes will display possible errors in your note based on the variables you have selected. This validation is only offered as a suggestion for you. PLEASE NOTE THAT THE VALIDATION TEXT WILL BE REMOVED WHEN YOU FINALIZE YOUR NOTE. IF YOU WANT TO FAX A PRELIMINARY NOTE YOU WILL NEED TO TOGGLE THIS TO 'NO' IF YOU DO NOT WANT IT IN YOUR FAXED NOTE.

## 2024-10-14 NOTE — PROCEDURE: LIQUID NITROGEN
Show Aperture Variable?: Yes
Application Tool (Optional): Liquid Nitrogen Sprayer
Render Note In Bullet Format When Appropriate: No
Consent: The patient's consent was obtained including but not limited to risks of crusting, scabbing, blistering, scarring, darker or lighter pigmentary change, recurrence, incomplete removal and infection.
Detail Level: Detailed
Number Of Freeze-Thaw Cycles: 3 freeze-thaw cycles
Post-Care Instructions: I reviewed with the patient in detail post-care instructions. Patient is to wear sunprotection, and avoid picking at any of the treated lesions. Pt may apply Vaseline to crusted or scabbing areas.
Duration Of Freeze Thaw-Cycle (Seconds): 10
Spray Paint Text: The liquid nitrogen was applied to the skin utilizing a spray paint frosting technique.
Medical Necessity Clause: This procedure was medically necessary because the lesions that were treated were:
Medical Necessity Information: It is in your best interest to select a reason for this procedure from the list below. All of these items fulfill various CMS LCD requirements except the new and changing color options.

## 2024-10-16 ENCOUNTER — RX ONLY (OUTPATIENT)
Age: 79
Setting detail: RX ONLY
End: 2024-10-16

## 2024-10-16 RX ORDER — FLUOROURACIL 5 MG/G
1 CREAM TOPICAL BID
Qty: 30 | Refills: 1 | Status: ERX | COMMUNITY
Start: 2024-10-16

## 2024-11-20 ENCOUNTER — APPOINTMENT (RX ONLY)
Dept: URBAN - METROPOLITAN AREA CLINIC 6 | Facility: CLINIC | Age: 79
Setting detail: DERMATOLOGY
End: 2024-11-20

## 2024-11-20 PROBLEM — D04.4 CARCINOMA IN SITU OF SKIN OF SCALP AND NECK: Status: ACTIVE | Noted: 2024-11-20

## 2024-11-20 PROCEDURE — ? COUNSELING

## 2024-11-20 PROCEDURE — ? PRESCRIPTION

## 2024-11-20 PROCEDURE — ? LIQUID NITROGEN

## 2024-11-20 PROCEDURE — 17000 DESTRUCT PREMALG LESION: CPT

## 2024-11-20 PROCEDURE — ? ADDITIONAL NOTES

## 2024-11-20 PROCEDURE — ? DIAGNOSIS COMMENT

## 2024-11-20 RX ORDER — FLUOROURACIL 5 MG/G
1 CREAM TOPICAL BID
Qty: 40 | Refills: 1 | Status: ERX

## 2024-11-20 NOTE — PROCEDURE: LIQUID NITROGEN
Render Note In Bullet Format When Appropriate: No
Number Of Freeze-Thaw Cycles: 3 freeze-thaw cycles
Show Applicator Variable?: Yes
Consent: The patient's consent was obtained including but not limited to risks of crusting, scabbing, blistering, scarring, darker or lighter pigmentary change, recurrence, incomplete removal and infection.
Detail Level: Detailed
Duration Of Freeze Thaw-Cycle (Seconds): 10
Application Tool (Optional): Liquid Nitrogen Sprayer
Post-Care Instructions: I reviewed with the patient in detail post-care instructions. Patient is to wear sunprotection, and avoid picking at any of the treated lesions. Pt may apply Vaseline to crusted or scabbing areas.

## 2024-11-20 NOTE — PROCEDURE: ADDITIONAL NOTES
Render Risk Assessment In Note?: no
Additional Notes: Back office phone number provided, encouraged to contact clinic with any problems getting rx for 5FU
Detail Level: Simple
Her/She

## 2024-11-20 NOTE — PROCEDURE: DIAGNOSIS COMMENT
Comment: Biopsy proved [id number]\\nPt said he was unable to  rx, will send rx to [facility] and follow up once healed \\nAlso treated with LN2 today.
Detail Level: Simple
Render Risk Assessment In Note?: no

## 2025-02-18 ENCOUNTER — APPOINTMENT (OUTPATIENT)
Dept: URBAN - METROPOLITAN AREA CLINIC 6 | Facility: CLINIC | Age: 80
Setting detail: DERMATOLOGY
End: 2025-02-18

## 2025-02-18 DIAGNOSIS — L57.0 ACTINIC KERATOSIS: ICD-10-CM

## 2025-02-18 PROBLEM — D04.4 CARCINOMA IN SITU OF SKIN OF SCALP AND NECK: Status: ACTIVE | Noted: 2025-02-18

## 2025-02-18 PROCEDURE — ? PRESCRIPTION MEDICATION MANAGEMENT

## 2025-02-18 PROCEDURE — ? SEPARATE AND IDENTIFIABLE DOCUMENTATION

## 2025-02-18 PROCEDURE — 99213 OFFICE O/P EST LOW 20 MIN: CPT | Mod: 25

## 2025-02-18 PROCEDURE — 17003 DESTRUCT PREMALG LES 2-14: CPT

## 2025-02-18 PROCEDURE — 17000 DESTRUCT PREMALG LESION: CPT

## 2025-02-18 PROCEDURE — ? LIQUID NITROGEN

## 2025-02-18 PROCEDURE — ? COUNSELING

## 2025-02-18 ASSESSMENT — LOCATION SIMPLE DESCRIPTION DERM
LOCATION SIMPLE: LEFT FOREHEAD
LOCATION SIMPLE: RIGHT EYEBROW

## 2025-02-18 ASSESSMENT — LOCATION DETAILED DESCRIPTION DERM
LOCATION DETAILED: LEFT SUPERIOR MEDIAL FOREHEAD
LOCATION DETAILED: RIGHT CENTRAL EYEBROW

## 2025-02-18 ASSESSMENT — LOCATION ZONE DERM: LOCATION ZONE: FACE

## 2025-02-18 NOTE — PROCEDURE: LIQUID NITROGEN
Number Of Freeze-Thaw Cycles: 3 freeze-thaw cycles
Show Aperture Variable?: Yes
Render Post-Care Instructions In Note?: no
Application Tool (Optional): Liquid Nitrogen Sprayer
Consent: The patient's consent was obtained including but not limited to risks of crusting, scabbing, blistering, scarring, darker or lighter pigmentary change, recurrence, incomplete removal and infection.
Post-Care Instructions: I reviewed with the patient in detail post-care instructions. Patient is to wear sunprotection, and avoid picking at any of the treated lesions. Pt may apply Vaseline to crusted or scabbing areas.
Detail Level: Detailed
Duration Of Freeze Thaw-Cycle (Seconds): 5

## 2025-03-11 ENCOUNTER — TELEPHONE (OUTPATIENT)
Dept: SURGERY | Facility: MEDICAL CENTER | Age: 80
End: 2025-03-11
Payer: COMMERCIAL

## 2025-04-14 ENCOUNTER — APPOINTMENT (OUTPATIENT)
Dept: URBAN - METROPOLITAN AREA CLINIC 6 | Facility: CLINIC | Age: 80
Setting detail: DERMATOLOGY
End: 2025-04-14

## 2025-04-14 DIAGNOSIS — L85.8 OTHER SPECIFIED EPIDERMAL THICKENING: ICD-10-CM

## 2025-04-14 DIAGNOSIS — Z71.89 OTHER SPECIFIED COUNSELING: ICD-10-CM

## 2025-04-14 DIAGNOSIS — Z85.828 PERSONAL HISTORY OF OTHER MALIGNANT NEOPLASM OF SKIN: ICD-10-CM | Status: STABLE

## 2025-04-14 DIAGNOSIS — D18.0 HEMANGIOMA: ICD-10-CM

## 2025-04-14 DIAGNOSIS — L81.4 OTHER MELANIN HYPERPIGMENTATION: ICD-10-CM

## 2025-04-14 DIAGNOSIS — L57.0 ACTINIC KERATOSIS: ICD-10-CM

## 2025-04-14 DIAGNOSIS — D22 MELANOCYTIC NEVI: ICD-10-CM

## 2025-04-14 DIAGNOSIS — L82.1 OTHER SEBORRHEIC KERATOSIS: ICD-10-CM

## 2025-04-14 PROBLEM — D18.01 HEMANGIOMA OF SKIN AND SUBCUTANEOUS TISSUE: Status: ACTIVE | Noted: 2025-04-14

## 2025-04-14 PROBLEM — D22.5 MELANOCYTIC NEVI OF TRUNK: Status: ACTIVE | Noted: 2025-04-14

## 2025-04-14 PROCEDURE — ? ADDITIONAL NOTES

## 2025-04-14 PROCEDURE — ? SUNSCREEN TREATMENT REGIMEN

## 2025-04-14 PROCEDURE — ? COUNSELING

## 2025-04-14 PROCEDURE — 17004 DESTROY PREMAL LESIONS 15/>: CPT

## 2025-04-14 PROCEDURE — 99213 OFFICE O/P EST LOW 20 MIN: CPT | Mod: 25

## 2025-04-14 PROCEDURE — ? LIQUID NITROGEN

## 2025-04-14 ASSESSMENT — LOCATION DETAILED DESCRIPTION DERM
LOCATION DETAILED: RIGHT RADIAL DORSAL HAND
LOCATION DETAILED: RIGHT INFERIOR CENTRAL MALAR CHEEK
LOCATION DETAILED: LEFT ULNAR DORSAL HAND
LOCATION DETAILED: NASAL TIP
LOCATION DETAILED: LEFT SUPERIOR PARIETAL SCALP
LOCATION DETAILED: STERNUM
LOCATION DETAILED: RIGHT LATERAL INFERIOR CHEST
LOCATION DETAILED: LEFT CENTRAL MALAR CHEEK
LOCATION DETAILED: SUPERIOR THORACIC SPINE
LOCATION DETAILED: EPIGASTRIC SKIN
LOCATION DETAILED: RIGHT ANTERIOR EARLOBE
LOCATION DETAILED: LEFT MEDIAL SUPERIOR CHEST
LOCATION DETAILED: RIGHT SUPERIOR LATERAL NECK
LOCATION DETAILED: RIGHT SUPERIOR FOREHEAD
LOCATION DETAILED: NASAL DORSUM
LOCATION DETAILED: RIGHT CENTRAL LATERAL NECK
LOCATION DETAILED: RIGHT SUPERIOR OCCIPITAL SCALP
LOCATION DETAILED: LEFT SUPERIOR LATERAL MALAR CHEEK
LOCATION DETAILED: RIGHT SUPERIOR MEDIAL MALAR CHEEK
LOCATION DETAILED: PERIUMBILICAL SKIN
LOCATION DETAILED: RIGHT MEDIAL SUPERIOR CHEST
LOCATION DETAILED: MID-FRONTAL SCALP
LOCATION DETAILED: LEFT MEDIAL INFERIOR CHEST
LOCATION DETAILED: RIGHT LATERAL SUPERIOR CHEST
LOCATION DETAILED: LEFT INFERIOR FOREHEAD
LOCATION DETAILED: POSTERIOR MID-PARIETAL SCALP

## 2025-04-14 ASSESSMENT — LOCATION SIMPLE DESCRIPTION DERM
LOCATION SIMPLE: LEFT FOREHEAD
LOCATION SIMPLE: LEFT HAND
LOCATION SIMPLE: LEFT CHEEK
LOCATION SIMPLE: SCALP
LOCATION SIMPLE: NOSE
LOCATION SIMPLE: RIGHT OCCIPITAL SCALP
LOCATION SIMPLE: ANTERIOR SCALP
LOCATION SIMPLE: RIGHT CHEEK
LOCATION SIMPLE: NECK
LOCATION SIMPLE: RIGHT HAND
LOCATION SIMPLE: UPPER BACK
LOCATION SIMPLE: ABDOMEN
LOCATION SIMPLE: CHEST
LOCATION SIMPLE: RIGHT FOREHEAD
LOCATION SIMPLE: RIGHT EAR
LOCATION SIMPLE: POSTERIOR SCALP

## 2025-04-14 ASSESSMENT — LOCATION ZONE DERM
LOCATION ZONE: EAR
LOCATION ZONE: HAND
LOCATION ZONE: TRUNK
LOCATION ZONE: NECK
LOCATION ZONE: NOSE
LOCATION ZONE: SCALP
LOCATION ZONE: FACE

## 2025-05-21 ENCOUNTER — APPOINTMENT (OUTPATIENT)
Dept: URBAN - METROPOLITAN AREA CLINIC 6 | Facility: CLINIC | Age: 80
Setting detail: DERMATOLOGY
End: 2025-05-21

## 2025-05-21 DIAGNOSIS — Z71.89 OTHER SPECIFIED COUNSELING: ICD-10-CM

## 2025-05-21 DIAGNOSIS — D492 NEOPLASM OF UNSPECIFIED NATURE OF BONE, SOFT TISSUE, AND SKIN: ICD-10-CM

## 2025-05-21 DIAGNOSIS — L57.0 ACTINIC KERATOSIS: ICD-10-CM

## 2025-05-21 PROBLEM — R22.2 LOCALIZED SWELLING, MASS AND LUMP, TRUNK: Status: ACTIVE | Noted: 2025-05-21

## 2025-05-21 PROCEDURE — ? SUNSCREEN TREATMENT REGIMEN

## 2025-05-21 PROCEDURE — 17000 DESTRUCT PREMALG LESION: CPT

## 2025-05-21 PROCEDURE — 99212 OFFICE O/P EST SF 10 MIN: CPT | Mod: 25

## 2025-05-21 PROCEDURE — ? COUNSELING

## 2025-05-21 PROCEDURE — 17003 DESTRUCT PREMALG LES 2-14: CPT

## 2025-05-21 PROCEDURE — ? LIQUID NITROGEN

## 2025-05-21 PROCEDURE — ? REFERRAL

## 2025-05-21 ASSESSMENT — LOCATION DETAILED DESCRIPTION DERM
LOCATION DETAILED: LEFT SUPERIOR OCCIPITAL SCALP
LOCATION DETAILED: INFERIOR THORACIC SPINE
LOCATION DETAILED: LEFT CENTRAL PARIETAL SCALP
LOCATION DETAILED: LEFT SUPERIOR POSTERIOR PARIETAL SCALP
LOCATION DETAILED: RIGHT FOREHEAD
LOCATION DETAILED: LEFT POSTERIOR PARIETAL SCALP
LOCATION DETAILED: LEFT SUPERIOR PARIETAL SCALP
LOCATION DETAILED: LEFT ULNAR DORSAL HAND
LOCATION DETAILED: RIGHT SUPERIOR PARIETAL SCALP
LOCATION DETAILED: LEFT INFERIOR FOREHEAD
LOCATION DETAILED: POSTERIOR MID-PARIETAL SCALP
LOCATION DETAILED: RIGHT RADIAL DORSAL HAND

## 2025-05-21 ASSESSMENT — LOCATION ZONE DERM
LOCATION ZONE: TRUNK
LOCATION ZONE: HAND
LOCATION ZONE: FACE
LOCATION ZONE: SCALP

## 2025-05-21 ASSESSMENT — LOCATION SIMPLE DESCRIPTION DERM
LOCATION SIMPLE: POSTERIOR SCALP
LOCATION SIMPLE: UPPER BACK
LOCATION SIMPLE: LEFT OCCIPITAL SCALP
LOCATION SIMPLE: SCALP
LOCATION SIMPLE: LEFT HAND
LOCATION SIMPLE: LEFT FOREHEAD
LOCATION SIMPLE: RIGHT FOREHEAD
LOCATION SIMPLE: RIGHT HAND

## 2025-05-21 NOTE — PROCEDURE: LIQUID NITROGEN
Consent: The patient's verbal consent was obtained including but not limited to risks of crusting, scabbing, blistering, scarring, darker or lighter pigmentary change, recurrence, incomplete removal and infection.
Duration Of Freeze Thaw-Cycle (Seconds): 10
Post-Care Instructions: I reviewed with the patient in detail post-care instructions. Patient is to wear sunprotection, and avoid picking at any of the treated lesions. Pt may apply Vaseline to crusted or scabbing areas.
Detail Level: Detailed
Render Post-Care Instructions In Note?: no
Show Aperture Variable?: Yes
Number Of Freeze-Thaw Cycles: 2 freeze-thaw cycles

## (undated) DEVICE — KIT CUSTOM PROCEDURE SINGLE FOR ENDO  (15/CA)

## (undated) DEVICE — SLEEVE, VASO, THIGH, MED

## (undated) DEVICE — PEN SKIN MARKER W/RULER - (50EA/BX)

## (undated) DEVICE — SYRINGE SAFETY 10 ML 18 GA X 1 1/2 BLUNT LL (100/BX 4BX/CA)

## (undated) DEVICE — KIT ANESTHESIA W/CIRCUIT & 3/LT BAG W/FILTER (20EA/CA)

## (undated) DEVICE — SODIUM CHL IRRIGATION 0.9% 1000ML (12EA/CA)

## (undated) DEVICE — SUCTION INSTRUMENT YANKAUER BULBOUS TIP W/O VENT (50EA/CA)

## (undated) DEVICE — TUBING CLEARLINK DUO-VENT - C-FLO (48EA/CA)

## (undated) DEVICE — CANISTER SUCTION RIGID RED 1500CC (40EA/CA)

## (undated) DEVICE — ADHESIVE MASTISOL - (48/BX)

## (undated) DEVICE — SENSOR SPO2 NEO LNCS ADHESIVE (20/BX) SEE USER NOTES

## (undated) DEVICE — TUBE CONNECTING SUCTION - CLEAR PLASTIC STERILE 72 IN (50EA/CA)

## (undated) DEVICE — SUTURE EYE

## (undated) DEVICE — SPONGE GAUZESTER 4 X 4 4PLY - (128PK/CA)

## (undated) DEVICE — ELECTRODE 850 FOAM ADHESIVE - HYDROGEL RADIOTRNSPRNT (50/PK)

## (undated) DEVICE — LACTATED RINGERS INJ 1000 ML - (14EA/CA 60CA/PF)

## (undated) DEVICE — CATHETER IV 20 GA X 1-1/4 ---SURG.& SDS ONLY--- (50EA/BX)

## (undated) DEVICE — CANISTER SUCTION 3000ML MECHANICAL FILTER AUTO SHUTOFF MEDI-VAC NONSTERILE LF DISP  (40EA/CA)

## (undated) DEVICE — GOWN WARMING STANDARD FLEX - (30/CA)

## (undated) DEVICE — MASK, LARYNGEAL AIRWAY #4

## (undated) DEVICE — APPLICATOR COTTONTIP 3 IN - STERILE (10EA/PK 100PK/CA)

## (undated) DEVICE — GLOVE BIOGEL SZ 7 SURGICAL PF LTX - (50PR/BX 4BX/CA)

## (undated) DEVICE — KIT  I.V. START (100EA/CA)

## (undated) DEVICE — WATER IRRIGATION STERILE 1000ML (12EA/CA)

## (undated) DEVICE — SET LEADWIRE 5 LEAD BEDSIDE DISPOSABLE ECG (1SET OF 5/EA)

## (undated) DEVICE — SPONGE GAUZE STER 4X4 8-PL - (2/PK 50PK/BX 12BX/CS)

## (undated) DEVICE — BOVIE NEEDLE TIP 3CM COLORADO